# Patient Record
Sex: MALE | Race: WHITE | NOT HISPANIC OR LATINO | Employment: FULL TIME | URBAN - METROPOLITAN AREA
[De-identification: names, ages, dates, MRNs, and addresses within clinical notes are randomized per-mention and may not be internally consistent; named-entity substitution may affect disease eponyms.]

---

## 2017-02-01 ENCOUNTER — OFFICE VISIT (OUTPATIENT)
Dept: DERMATOLOGY | Facility: CLINIC | Age: 62
End: 2017-02-01

## 2017-02-01 DIAGNOSIS — Z85.828 HISTORY OF SKIN CANCER: ICD-10-CM

## 2017-02-01 DIAGNOSIS — D48.5 NEOPLASM OF UNCERTAIN BEHAVIOR OF SKIN: Primary | ICD-10-CM

## 2017-02-01 DIAGNOSIS — L90.5 SCAR: ICD-10-CM

## 2017-02-01 DIAGNOSIS — L57.0 ACTINIC KERATOSIS: ICD-10-CM

## 2017-02-01 DIAGNOSIS — L81.4 SOLAR LENTIGINOSIS: ICD-10-CM

## 2017-02-01 DIAGNOSIS — D22.9 MULTIPLE BENIGN NEVI: ICD-10-CM

## 2017-02-01 RX ORDER — LIDOCAINE HYDROCHLORIDE AND EPINEPHRINE 10; 10 MG/ML; UG/ML
3 INJECTION, SOLUTION INFILTRATION; PERINEURAL ONCE
Qty: 3 ML | Refills: 0 | OUTPATIENT
Start: 2017-02-01 | End: 2017-02-01

## 2017-02-01 NOTE — Clinical Note
"2/1/2017       RE: Eric Espinosa  [address]   [facility] [place] [address]     Dear Colleague,    Thank you for referring your patient, Eric Espinosa, to the [facility] DERMATOLOGY at [facility]. Please see a copy of my visit note below.    [facility] Dermatology Note      Dermatology Problem List:  1. History of basal cell carcinoma of the nose, L superior shoulder ([facility] 2013), and forehead (2012 w/ Dr. Fernández in [place], [place]).  2. Hypertrophic scars of the left nasal alar and left superior shoulder: both improved w/o treatment, consider CO2 laser to L nasal ala in future  3. Actinic keratoses  4. Hemangioma L shoudler s/p excision 4/26/2016 (suspected lymph node)  5. Possible psoriasis of scalp  6. NUB: L lower cheek ddx hemangioma v. BCC v. Other, bx 2/1/17  7. Spot to monitor: L scalp 4 mm erosion    Encounter Date: Feb 1, 2017    CC:   Chief Complaint   Patient presents with     Skin Check     New \"spots\" on face and rt. shoulder        History of Present Illness:  Mr. Eric Espinosa is a 61 year old male who presents for full body skin check and discussion of scar treatment options. Additionally, other concerns include marks on the forehead,, and L lower cheek jean paul that does not resolve. Also notes 2 bumps at the corner of his mouth, one closest to mouth has been present for many years, the other lateral to that has appeared within the last year. None of these spots are tender, bleeding or otherwise symptomatic.    Lastly, R lateral shoulder has pimple-like lesion that has been present one week that he had like looked at. This spot has bled.    He has had a total of 3 BCC: nose, forehead and shoulder. He denies any tender, bleeding or otherwise symptomatic spots other than those mentioned above.    He wears SPF 30+ sunscreen in the summer in the sun. Does not wear in the winter. No tanning bed use. No family h/o skin cancer to his knowledge.      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 The patient denies use of tanning beds.    Family History:  There is no family history of skin cancer.    Medications:  Current Outpatient Prescriptions   Medication Sig Dispense Refill     fluocinonide (LIDEX) 0.05 % external solution Apply topically 2 times daily 60 mL 3     ibuprofen (ADVIL,MOTRIN) 200 MG tablet Take 200 mg by mouth every 4 hours as needed       MELATONIN PO Take 5 tablets by mouth At Bedtime       zaleplon (SONATA) 5 MG capsule Take 1 capsule (5 mg) by mouth nightly as needed for sleep 15 capsule 0        Allergies   Allergen Reactions     Ciprofloxacin Rash         Review of Systems:  -Constitutional: The patient denies fatigue, fevers, chills, unintended weight loss, and night sweats.  -HEENT: Patient denies nonhealing oral sores.    Physical exam:  Vitals: There were no vitals taken for this visit.  GEN: This is a well developed, well-nourished male in no acute distress, in a pleasant mood.    SKIN: Full skin, which includes the head/face, both arms, chest, back, abdomen,both legs, genitalia and/or groin buttocks, digits and/or nails, was examined.  - There are erythematous macules with overyling adherent scale on the forehead x 3, R inferior helix x 1, L superior helix x 1  - There is a 2 mm pink smooth papule with increased vascularity but no specific vessel pattern under dermoscopy on the L lower cheek  - 1-2 mm of linear scar tissue at the superior aspect of  nasal ala graft scar  -There are two skin colored 3 mm papules just lateral to angle of mouth on left with no concerning dermoscopic features.  -Few scattered regular brown pigmented macules and papules are identified on the trunk and extremities, none with concerning features.  - Tan 2-3 mm macules on sun-exposed areas of the face, shoulders and upper chest and back  There is no erythema, telangectasias, nodularity, or pigmentation on the left nasal ala, left superior shoulder, and forehead at prev sites of skin cancer.  -There are few 3-4 mm waxy stuck on tan to brown papules on the forehead .  -4 mm angular  erosion on L fronto-temporal scalp  -No other lesions of concern on areas examined.     Impression/Plan:  1. NUB: ddx hemangioma v. BCC v. Other, left lower cheek    Punch biopsy:  After discussion of benefits and risks including but not limited to bleeding/bruising, pain/swelling, infection, scar, incomplete removal, nerve damage/numbness, recurrence, and non-diagnostic biopsy, written consent, verbal consent and photographs were obtained. Time-out was performed. The area was cleaned with isopropyl alcohol. 1 mL of 1% lidocaine with epinephrine was injected to obtain adequate anesthesia of the lesion on the L lower cheek. A 2 mm punch biopsy was performed.  6-0 prolene sutures were utilized to approximate the epidermal edges.  White petroleum jelly/VaselineTM and a bandage was applied to the wound.  Explicit verbal and written wound care instructions were provided.  The patient left the Dermatology Clinic in good condition. The patient was counseled to follow up for suture removal in approximately 7 days.    Will inform pt of results within the week    2. History of basal cell carcinoma of the nose, L superior shoulder ([facility] 2013), and forehead (2012 w/ Dr. Fernández in [place], [place]. No clinical evidence of recurrence    Sun protection and sunscreen counseling reviewed.    3. Scars of the left nasal alar  and left superior shoulder: both improved w/o treatment    discussed CO2 ablative laser procedure for treatment of excess scar tissue    pt to schedule as desired    4. Actinic keratoses:     Cryotherapy procedure note: After verbal consent and discussion of risks and benefits including but no limited to dyspigmentation/scar, blister, and pain, 5 total lesions was(were) treated with 1-2mm freeze border for 2 cycles with liquid nitrogen at sites documented above. Post cryotherapy instructions were provided.     5. Possible psoriasis of scalp with one 4 mm angular erosion appears to be from external source such as excoriation, will see in 3 mos for f/u for resolution    6. Erosion, also appears external right shoulder, recheck in 3 months    7. Multiple benign nevi and solar lentigenes     Follow-up in 3 months, earlier for new or changing lesions.       Dr. Patterson staffed the patient.    Staff Involved:  Resident(Nancy Monroy)/Staff(as above)    Nancy Monroy MD  PGY-3 Dermatology  Pager: [phone]

## 2017-02-01 NOTE — PROGRESS NOTES
"[facility] Dermatology Note      Dermatology Problem List:  1. History of basal cell carcinoma of the nose, L superior shoulder ([facility] 2013), and forehead (2012 w/ Dr. Fernández in [place], [place]).  2. Hypertrophic scars of the left nasal alar and left superior shoulder: both improved w/o treatment, consider CO2 laser to L nasal ala in future  3. Actinic keratoses  4. Hemangioma L shoudler s/p excision 4/26/2016 (suspected lymph node)  5. Possible psoriasis of scalp  6. NUB: L lower cheek ddx hemangioma v. BCC v. Other, bx 2/1/17  7. Spot to monitor: L scalp 4 mm erosion    Encounter Date: Feb 1, 2017    CC:   Chief Complaint   Patient presents with     Skin Check     New \"spots\" on face and rt. shoulder        History of Present Illness:  Mr. Eric Espinosa is a 61 year old male who presents for full body skin check and discussion of scar treatment options. Additionally, other concerns include marks on the forehead, and L lower cheek jean paul that does not resolve. Also notes 2 bumps at the corner of his mouth, one closest to mouth has been present for many years, the other lateral to that has appeared within the last year. None of these spots are tender, bleeding or otherwise symptomatic.    Lastly, R lateral shoulder has pimple-like lesion that has been present one week that he had like looked at. This spot has bled.    He has had a total of 3 BCC: nose, forehead and shoulder. He denies any tender, bleeding or otherwise symptomatic spots other than those mentioned above.    He wears SPF 30+ sunscreen in the summer in the sun. Does not wear in the winter. No tanning bed use. No family h/o skin cancer to his knowledge.      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 The patient denies use of tanning beds.    Family History:  There is no family history of skin cancer.    Medications:  Current Outpatient Prescriptions   Medication Sig Dispense Refill     fluocinonide (LIDEX) 0.05 % external solution Apply topically 2 times daily 60 mL 3     ibuprofen (ADVIL,MOTRIN) 200 MG tablet Take 200 mg by mouth every 4 hours as needed       MELATONIN PO Take 5 tablets by mouth At Bedtime       zaleplon (SONATA) 5 MG capsule Take 1 capsule (5 mg) by mouth nightly as needed for sleep 15 capsule 0        Allergies   Allergen Reactions     Ciprofloxacin Rash         Review of Systems:  -Constitutional: The patient denies fatigue, fevers, chills, unintended weight loss, and night sweats.  -HEENT: Patient denies nonhealing oral sores.    Physical exam:  Vitals: There were no vitals taken for this visit.  GEN: This is a well developed, well-nourished male in no acute distress, in a pleasant mood.    SKIN: Full skin, which includes the head/face, both arms, chest, back, abdomen,both legs, genitalia and/or groin buttocks, digits and/or nails, was examined.  - There are erythematous macules with overyling adherent scale on the forehead x 3, R inferior helix x 1, L superior helix x 1  - There is a 2 mm pink smooth papule with increased vascularity but no specific vessel pattern under dermoscopy on the L lower cheek  - 1-2 mm of linear scar tissue at the superior aspect of nasal ala graft scar  -There are two skin colored 3 mm papules just lateral to angle of mouth on left with no concerning dermoscopic features.  -Few scattered regular brown pigmented macules and papules are identified on the trunk and extremities, none with concerning  features.  - Tan 2-3 mm macules on sun-exposed areas of the face, shoulders and upper chest and back  There is no erythema, telangectasias, nodularity, or pigmentation on the left nasal ala, left superior shoulder, and forehead at prev sites of skin cancer.  -There are few 3-4 mm waxy stuck on tan to brown papules on the forehead .  -4 mm angular  erosion on L fronto-temporal scalp  -No other lesions of concern on areas examined.     Impression/Plan:  1. NUB: ddx hemangioma v. BCC v. Other, left lower cheek    Punch biopsy:  After discussion of benefits and risks including but not limited to bleeding/bruising, pain/swelling, infection, scar, incomplete removal, nerve damage/numbness, recurrence, and non-diagnostic biopsy, written consent, verbal consent and photographs were obtained. Time-out was performed. The area was cleaned with isopropyl alcohol. 1 mL of 1% lidocaine with epinephrine was injected to obtain adequate anesthesia of the lesion on the L lower cheek. A 2 mm punch biopsy was performed.  6-0 prolene sutures were utilized to approximate the epidermal edges.  White petroleum jelly/VaselineTM and a bandage was applied to the wound.  Explicit verbal and written wound care instructions were provided.  The patient left the Dermatology Clinic in good condition. The patient was counseled to follow up for suture removal in approximately 7 days.    Will inform pt of results within the week    2. History of basal cell carcinoma of the nose, L superior shoulder ([facility] 2013), and forehead (2012 w/ Dr. Fernández in [place], [place]. No clinical evidence of recurrence    Sun protection and sunscreen counseling reviewed.    3. Scars of the left nasal alar and left superior shoulder: both improved w/o treatment    discussed CO2 ablative laser procedure for treatment of excess scar tissue    pt to schedule as desired    4. Actinic keratoses:     Cryotherapy procedure note: After verbal consent and discussion of risks and  benefits including but no limited to dyspigmentation/scar, blister, and pain, 5 total lesions was(were) treated with 1-2mm freeze border for 2 cycles with liquid nitrogen at sites documented above. Post cryotherapy instructions were provided.     5. Possible psoriasis of scalp with one 4 mm angular erosion appears to be from external source such as excoriation, will see in 3 mos for f/u for resolution    6. Erosion, also appears external right shoulder, recheck in 3 months    7. Multiple benign nevi and solar lentigenes     Follow-up in 3 months, earlier for new or changing lesions.       Dr. Patterson staffed the patient.    Staff Involved:  Resident(Nancy Monroy)/Staff(as above)    Nancy Monroy MD  PGY-3 Dermatology  Pager: [phone]      Staff Physician Comments:   I saw and evaluated the patient with the resident and I agree with the assessment and plan.  I was present for the key portions of the above major procedure and examination..    Malathi Patterson MD    Department of Dermatology  [facility]: Phone: [phone], Fax:[phone]  [facility]: Phone: [phone], Fax: [phone]

## 2017-02-01 NOTE — MR AVS SNAPSHOT
After Visit Summary   2/1/2017    Eric Espinosa    MRN: [id number]           Patient Information     Date Of Birth          1955        Visit Information        Provider Department      2/1/2017 10:30 AM Malathi Patterson MD [facility] Dermatology        Today's Diagnoses     Neoplasm of uncertain behavior of skin    -  1     Actinic keratosis         History of skin cancer           Care Instructions    Cryotherapy    What is it?    Use of a very cold liquid, such as liquid nitrogen, to freeze and destroy abnormal skin cells that need to be removed    What should I expect?    Tenderness and redness    A small blister that might grow and fill with dark purple blood. There may be crusting.    More than one treatment may be needed if the lesions do not go away.    How do I care for the treated area?    Gently wash the area with your hands when bathing.    Use a thin layer of Vaseline to help with healing. You may use a Band-Aid.     The area should heal within 7-10 days and may leave behind a pink or lighter color.     Do not use an antibiotic or Neosporin ointment.     You may take acetaminophen (Tylenol) for pain.     Call your Doctor if you have:    Severe pain    Signs of infection (warmth, redness, cloudy yellow drainage, and or a bad smell)    Questions or concerns    Who should I call with questions?       [facility]: [phone]       [facility]: [phone]       For urgent needs outside of business hours call the [facility] at [phone]        and ask for the dermatology resident on call      Wound Care After a Biopsy    What is a skin biopsy?  A skin biopsy allows the doctor to examine a very small piece of tissue under the microscope to determine the diagnosis and the best treatment for the skin condition. A local anesthetic (numbing medicine)  is injected with a very small needle into the skin area to be tested. A  small piece of skin is taken from the area. Sometimes a suture (stitch) is used.     What are the risks of a skin biopsy?  I will experience scar, bleeding, swelling, pain, crusting and redness. I may experience incomplete removal or recurrence. Risks of this procedure are excessive bleeding, bruising, infection, nerve damage, numbness, thick (hypertrophic or keloidal) scar and non-diagnostic biopsy.    How should I care for my wound for the first 24 hours?    Keep the wound dry and covered for 24 hours    If it bleeds, hold direct pressure on the area for 15 minutes. If bleeding does not stop then go to the emergency room    Avoid strenuous exercise the first 1-2 days or as your doctor instructs you    How should I care for the wound after 24 hours?    After 24 hours, remove the bandage    You may bathe or shower as normal    If you had a scalp biopsy, you can shampoo as usual and can use shower water to clean the biopsy site daily    Clean the wound twice a day with gentle soap and water    Do not scrub, be gentle    Apply white petroleum/Vaseline after cleaning the wound with a cotton swab or a clean finger, and keep the site covered with a Bandaid /bandage. Bandages are not necessary with a scalp biopsy    If you are unable to cover the site with a Bandaid /bandage, re-apply ointment 2-3 times a day to keep the site moist. Moisture will help with healing    Avoid strenuous activity for first 1-2 days    Avoid lakes, rivers, pools, and oceans until the stitches are removed or the site is healed    How do I clean my wound?    Wash hands for 15 minutes with soap or use hand  before all wound care    Clean the wound with gentle soap and water    Apply white petroleum/Vaseline  to wound after it is clean    Replace the Bandaid /bandage to keep the wound covered for the first few days or as instructed by your doctor    If you had a scalp biopsy, warm shower water to the area on a daily basis should  suffice    What should I use to clean my wound?     Cotton-tipped applicators (Qtips )    White petroleum jelly (Vaseline ). Use a clean new container and use Q-tips to apply.    Bandaids   as needed    Gentle soap     How should I care for my wound long term?    Do not get your wound dirty    Keep up with wound care for one week or until the area is healed.    A small scab will form and fall off by itself when the area is completely healed. The area will be red and will become pink in color as it heals. Sun protection is very important for how your scar will turn out. Sunscreen with an SPF 30 or greater is recommended once the area is healed.    If you have stitches, stitches need to be removed on 2.8.17. You may return to our clinic for this or you may have it done locally at your doctor s office.    You should have some soreness but it should be mild and slowly go away over several days. Talk to your doctor about using tylenol for pain,    When should I call my doctor?  If you have increased:     Pain or swelling    Pus or drainage (clear or slightly yellow drainage is ok)    Temperature over 100F    Spreading redness or warmth around wound    When will I hear about my results?  The biopsy results can take 2-3 weeks to come back. The clinic will call you with the results, send you a [email or web address] message, or have you schedule a follow-up clinic or phone time to discuss the results. Contact our clinics if you do not hear from us in 3 weeks.     Who should I call with questions?    [facility]: [phone]     [facility]: [phone]    For urgent needs outside of business hours call the [facility] at [phone] and ask for the dermatology resident on call            Follow-ups after your visit        Your next 10 appointments already scheduled     Feb 08, 2017  1:00 PM   Nurse Visit with  Dermatology Nurse   [facility] Dermatology ([facility]  [facility])    [address] [id number] [phone]            May 03, 2017 11:15 AM   (Arrive by 11:00 AM)   Return Visit with Malathi Patterson MD   [facility] Dermatology ([facility])    [address] [place] [id number] [phone]              Who to contact     Please call your clinic at [phone] to:    Ask questions about your health    Make or cancel appointments    Discuss your medicines    Learn about your test results    Speak to your doctor   If you have compliments or concerns about an experience at your clinic, or if you wish to file a complaint, please contact [facility] Patient Relations at [phone] or email us at [email or web address]         Additional Information About Your Visit        MyChart Information     [employer] gives you secure access to your electronic health record. If you see a primary care provider, you can also send messages to your care team and make appointments. If you have questions, please call your primary care clinic.  If you do not have a primary care provider, please call [phone] and they will assist you.      [employer] is an electronic gateway that provides easy, online access to your medical records. With [employer], you can request a clinic appointment, read your test results, renew a prescription or communicate with your care team.     To access your existing account, please contact your [facility] or call [phone] for assistance.        Care EveryWhere ID     This is your Care EveryWhere ID. This could be used by other organizations to access your [place] medical records  [id number]         Blood Pressure from Last 3 Encounters:   09/06/16 132/82   06/13/16 119/78   06/04/16 145/91    Weight from Last 3 Encounters:   09/06/16 78.926 kg (174 lb)   06/13/16 78.336 kg (172 lb 11.2 oz)   06/04/16  78.472 kg (173 lb)              We Performed the Following     BIOPSY SKIN/SUBQ/MUC MEM, SINGLE LESION     DESTRUCT PREMALIGNANT LESION, 2-14     DESTRUCT PREMALIGNANT LESION, FIRST     Surgical pathology exam          Today's Medication Changes          These changes are accurate as of: 2/1/17 11:58 AM.  If you have any questions, ask your nurse or doctor.               Start taking these medicines.        Dose/Directions    lidocaine 1% with EPINEPHrine 1:100,000 1 %-1:565341 injection   Used for:  Neoplasm of uncertain behavior of skin        Dose:  3 mL   Inject 3 mLs into the skin once for 1 dose   Quantity:  3 mL   Refills:  0            Where to get your medicines      Some of these will need a paper prescription and others can be bought over the counter.  Ask your nurse if you have questions.     You don't need a prescription for these medications    - lidocaine 1% with EPINEPHrine 1:100,000 1 %-1:699386 injection             Primary Care Provider Office Phone # Fax #    Lanre Garland [phone]       [address] [address]        Thank you!     Thank you for choosing [facility] DERMATOLOGY  for your care. Our goal is always to provide you with excellent care. Hearing back from our patients is one way we can continue to improve our services. Please take a few minutes to complete the written survey that you may receive in the mail after your visit with us. Thank you!             Your Updated Medication List - Protect others around you: Learn how to safely use, store and throw away your medicines at www.disposemymeds.org.          This list is accurate as of: 2/1/17 11:58 AM.  Always use your most recent med list.                   Brand Name Dispense Instructions for use    fluocinonide 0.05 % solution    LIDEX    60 mL    Apply topically 2 times daily       ibuprofen 200 MG tablet    ADVIL/MOTRIN     Take 200 mg by mouth every 4 hours as needed        lidocaine 1% with EPINEPHrine 1:100,000 1 %-1:635250 injection     3 mL    Inject 3 mLs into the skin once for 1 dose       MELATONIN PO      Take 5 tablets by mouth At Bedtime       zaleplon 5 MG capsule    sonata    15 capsule    Take 1 capsule (5 mg) by mouth nightly as needed for sleep

## 2017-02-01 NOTE — NURSING NOTE
"Chief Complaint   Patient presents with     Skin Check     New \"spots\" on face and rt. shoulder      Kala Hensley RN     "

## 2017-02-01 NOTE — PATIENT INSTRUCTIONS
Cryotherapy    What is it?    Use of a very cold liquid, such as liquid nitrogen, to freeze and destroy abnormal skin cells that need to be removed    What should I expect?    Tenderness and redness    A small blister that might grow and fill with dark purple blood. There may be crusting.    More than one treatment may be needed if the lesions do not go away.    How do I care for the treated area?    Gently wash the area with your hands when bathing.    Use a thin layer of Vaseline to help with healing. You may use a Band-Aid.     The area should heal within 7-10 days and may leave behind a pink or lighter color.     Do not use an antibiotic or Neosporin ointment.     You may take acetaminophen (Tylenol) for pain.     Call your Doctor if you have:    Severe pain    Signs of infection (warmth, redness, cloudy yellow drainage, and or a bad smell)    Questions or concerns    Who should I call with questions?       [facility]: [phone]       [facility]: [phone]       For urgent needs outside of business hours call the [facility] at [phone]        and ask for the dermatology resident on call      Wound Care After a Biopsy    What is a skin biopsy?  A skin biopsy allows the doctor to examine a very small piece of tissue under the microscope to determine the diagnosis and the best treatment for the skin condition. A local anesthetic (numbing medicine)  is injected with a very small needle into the skin area to be tested. A small piece of skin is taken from the area. Sometimes a suture (stitch) is used.     What are the risks of a skin biopsy?  I will experience scar, bleeding, swelling, pain, crusting and redness. I may experience incomplete removal or recurrence. Risks of this procedure are excessive bleeding, bruising, infection, nerve damage, numbness, thick (hypertrophic or keloidal) scar and non-diagnostic biopsy.    How should I care  for my wound for the first 24 hours?    Keep the wound dry and covered for 24 hours    If it bleeds, hold direct pressure on the area for 15 minutes. If bleeding does not stop then go to the emergency room    Avoid strenuous exercise the first 1-2 days or as your doctor instructs you    How should I care for the wound after 24 hours?    After 24 hours, remove the bandage    You may bathe or shower as normal    If you had a scalp biopsy, you can shampoo as usual and can use shower water to clean the biopsy site daily    Clean the wound twice a day with gentle soap and water    Do not scrub, be gentle    Apply white petroleum/Vaseline after cleaning the wound with a cotton swab or a clean finger, and keep the site covered with a Bandaid /bandage. Bandages are not necessary with a scalp biopsy    If you are unable to cover the site with a Bandaid /bandage, re-apply ointment 2-3 times a day to keep the site moist. Moisture will help with healing    Avoid strenuous activity for first 1-2 days    Avoid lakes, rivers, pools, and oceans until the stitches are removed or the site is healed    How do I clean my wound?    Wash hands for 15 minutes with soap or use hand  before all wound care    Clean the wound with gentle soap and water    Apply white petroleum/Vaseline  to wound after it is clean    Replace the Bandaid /bandage to keep the wound covered for the first few days or as instructed by your doctor    If you had a scalp biopsy, warm shower water to the area on a daily basis should suffice    What should I use to clean my wound?     Cotton-tipped applicators (Qtips )    White petroleum jelly (Vaseline ). Use a clean new container and use Q-tips to apply.    Bandaids   as needed    Gentle soap     How should I care for my wound long term?    Do not get your wound dirty    Keep up with wound care for one week or until the area is healed.    A small scab will form and fall off by itself when the area is  completely healed. The area will be red and will become pink in color as it heals. Sun protection is very important for how your scar will turn out. Sunscreen with an SPF 30 or greater is recommended once the area is healed.    If you have stitches, stitches need to be removed on 2.8.17. You may return to our clinic for this or you may have it done locally at your doctor s office.    You should have some soreness but it should be mild and slowly go away over several days. Talk to your doctor about using tylenol for pain,    When should I call my doctor?  If you have increased:     Pain or swelling    Pus or drainage (clear or slightly yellow drainage is ok)    Temperature over 100F    Spreading redness or warmth around wound    When will I hear about my results?  The biopsy results can take 2-3 weeks to come back. The clinic will call you with the results, send you a [employer] message, or have you schedule a follow-up clinic or phone time to discuss the results. Contact our clinics if you do not hear from us in 3 weeks.     Who should I call with questions?    [facility]: [phone]     [facility]: [phone]    For urgent needs outside of business hours call the [facility] at [phone] and ask for the dermatology resident on call

## 2017-02-06 LAB — COPATH REPORT: NORMAL

## 2017-02-08 ENCOUNTER — ALLIED HEALTH/NURSE VISIT (OUTPATIENT)
Dept: DERMATOLOGY | Facility: CLINIC | Age: 62
End: 2017-02-08

## 2017-02-08 DIAGNOSIS — D48.5 NEOPLASM OF UNCERTAIN BEHAVIOR OF SKIN: Primary | ICD-10-CM

## 2017-02-08 NOTE — NURSING NOTE
Dermatology Rooming Note    Eric Espinosa's goals for this visit include:   Chief Complaint   Patient presents with     Suture Removal     Biopsy done on left lower cheek       Willow Brooks CMA    Removed 1 sutures, from Left lower cheek without complication.  Patient tolerated procedure well, and understood all followup instructions.

## 2017-03-01 ENCOUNTER — OFFICE VISIT (OUTPATIENT)
Dept: OPHTHALMOLOGY | Facility: CLINIC | Age: 62
End: 2017-03-01
Attending: OPHTHALMOLOGY
Payer: COMMERCIAL

## 2017-03-01 DIAGNOSIS — H25.13 SENILE NUCLEAR SCLEROSIS, BILATERAL: ICD-10-CM

## 2017-03-01 DIAGNOSIS — H52.4 MYOPIA WITH PRESBYOPIA OF BOTH EYES: Primary | ICD-10-CM

## 2017-03-01 DIAGNOSIS — H52.13 MYOPIA WITH PRESBYOPIA OF BOTH EYES: Primary | ICD-10-CM

## 2017-03-01 DIAGNOSIS — H04.123 DRY EYES, BILATERAL: ICD-10-CM

## 2017-03-01 PROCEDURE — 92015 DETERMINE REFRACTIVE STATE: CPT | Mod: ZF

## 2017-03-01 PROCEDURE — 99213 OFFICE O/P EST LOW 20 MIN: CPT | Mod: ZF

## 2017-03-01 ASSESSMENT — CONF VISUAL FIELD
OS_NORMAL: 1
METHOD: COUNTING FINGERS
OD_NORMAL: 1

## 2017-03-01 ASSESSMENT — TONOMETRY
OD_IOP_MMHG: 17
OD_IOP_MMHG: 21
OS_IOP_MMHG: 20
OS_IOP_MMHG: 19
IOP_METHOD: APPLANATION
OS_IOP_MMHG: 23
OD_IOP_MMHG: 24
IOP_METHOD: APPLANATION
IOP_METHOD: TONOPEN

## 2017-03-01 ASSESSMENT — REFRACTION_MANIFEST
OS_ADD: +2.50
OD_SPHERE: -4.75
OD_CYLINDER: +0.50
OS_CYLINDER: +0.50
OS_SPHERE: -5.50
OS_AXIS: 060
OD_AXIS: 095
OD_ADD: +2.50

## 2017-03-01 ASSESSMENT — CUP TO DISC RATIO
OS_RATIO: 0.3
OD_RATIO: 0.3

## 2017-03-01 ASSESSMENT — REFRACTION_WEARINGRX
OD_AXIS: 103
SPECS_TYPE: SVL
OS_CYLINDER: +0.50
OD_SPHERE: -4.25
OD_CYLINDER: +0.25
OS_AXIS: 085
OS_SPHERE: -5.00

## 2017-03-01 ASSESSMENT — VISUAL ACUITY
OD_CC: 20/20
OS_CC+: -2
CORRECTION_TYPE: GLASSES
OS_CC: 20/25
OD_CC+: -2
METHOD: SNELLEN - LINEAR

## 2017-03-01 ASSESSMENT — EXTERNAL EXAM - RIGHT EYE: OD_EXAM: NORMAL

## 2017-03-01 ASSESSMENT — PACHYMETRY
OS_CT(UM): 514
OD_CT(UM): 511

## 2017-03-01 ASSESSMENT — EXTERNAL EXAM - LEFT EYE: OS_EXAM: NORMAL

## 2017-03-01 NOTE — MR AVS SNAPSHOT
After Visit Summary   3/1/2017    Eric Espinosa    MRN: [id number]           Patient Information     Date Of Birth          1955        Visit Information        Provider Department      3/1/2017 9:30 AM Grisel Ingram MD [facility]        Today's Diagnoses     Myopia with presbyopia of both eyes    -  1    Senile nuclear sclerosis, bilateral        Dry eyes, bilateral           Follow-ups after your visit        Follow-up notes from your care team     Return in about 1 year (around 3/1/2018).      Your next 10 appointments already scheduled     May 03, 2017 11:15 AM CDT   (Arrive by 11:00 AM)   Return Visit with Malathi Patterson MD   [facility] ([facility])    [address]  [address] [facility] [address] [phone]              Who to contact     Please call your clinic at [phone] to:    Ask questions about your health    Make or cancel appointments    Discuss your medicines    Learn about your test results    Speak to your doctor   If you have compliments or concerns about an experience at your clinic, or if you wish to file a complaint, please contact [facility] Patient Relations at [phone] or email us at [email or web address]         Additional Information About Your Visit        MyChart Information     [employer] gives you secure access to your electronic health record. If you see a primary care provider, you can also send messages to your care team and make appointments. If you have questions, please call your primary care clinic.  If you do not have a primary care provider, please call [phone] and they will assist you.      [employer] is an electronic gateway that provides easy, online access to your medical records. With [employer], you can request a clinic appointment, read your test results, renew a prescription or communicate with your care team.     To access your existing account, please  contact your [facility] or call [phone] for assistance.        Care EveryWhere ID     This is your Care EveryWhere ID. This could be used by other organizations to access your [place] medical records  [id number]         Blood Pressure from Last 3 Encounters:   09/06/16 132/82   06/13/16 119/78   06/04/16 (!) 145/91    Weight from Last 3 Encounters:   09/06/16 78.9 kg (174 lb)   06/13/16 78.3 kg (172 lb 11.2 oz)   06/04/16 78.5 kg (173 lb)              Today, you had the following     No orders found for display       Primary Care Provider Office Phone # Fax #    Lanre Garland [phone]       [address] [address]        Thank you!     Thank you for choosing EYE CLINIC  for your care. Our goal is always to provide you with excellent care. Hearing back from our patients is one way we can continue to improve our services. Please take a few minutes to complete the written survey that you may receive in the mail after your visit with us. Thank you!             Your Updated Medication List - Protect others around you: Learn how to safely use, store and throw away your medicines at www.disposemymeds.org.          This list is accurate as of: 3/1/17 10:47 AM.  Always use your most recent med list.                   Brand Name Dispense Instructions for use    ARTIFICIAL TEAR OP      Apply 1 drop to eye as needed       fluocinonide 0.05 % solution    LIDEX    60 mL    Apply topically 2 times daily       ibuprofen 200 MG tablet    ADVIL/MOTRIN     Take 200 mg by mouth every 4 hours as needed       MELATONIN PO      Take 5 tablets by mouth At Bedtime       zaleplon 5 MG capsule    sonata    15 capsule    Take 1 capsule (5 mg) by mouth nightly as needed for sleep

## 2017-03-01 NOTE — NURSING NOTE
"Chief Complaints and History of Present Illnesses   Patient presents with     Blurred Vision Both Eyes     HPI    Affected eye(s):  Both   Symptoms:     No floaters   No flashes   No redness   No Dryness         Do you have eye pain now?:  No      Comments:  Pt having more difficulty seeing in the distance with current distant glasses. Pt has a prescription for distant glasses and separate glasses for near. Pt stopped using near glasses a few months ago because he saw clearer up close with out them.  Pt uses Systane lid wipes every morning to clear the \"sleep\" from his eyes.    Roman TERRY March 1, 2017 9:41 AM               "

## 2017-03-01 NOTE — PROGRESS NOTES
HPI:  Eric Espinosa is a 61 year old male myopia with astigmatism. Complains of am crusting. Using eye wipes daily. Complains of blurry vision at distance. Wears bifocals for using the computer. Will take glasses off while reading. Does complain of redness and occasional tearing, worse in the evening. Denies flashes or floaters. Also c/o photophobia on bright geoff days, wears sunglasses with relief. Denies halo symptoms at night.       Past Ocular Hx: myopia with astigmatism, blepharitis/conjunctivitis  Past Medical Hx: BCC x 3 (nose, shoulder, forehead), psoriasis  Past Surgical Hx: MOHS x3, ab hernia repair 2005, deviated septum repair 1995,       Current Eye Medications:  AT   Flax seed oil    Assessment & Plan:  (H52.13,  H52.4) Myopia with presbyopia of both eyes  (primary encounter diagnosis)  Comment:  Good vision with refraction  Plan: Given updated glasses Rx.     (H25.13) Senile nuclear sclerosis, bilateral  Comment: not visually significant  Plan: continue to monitor for now    (H04.123) Dry eyes, bilateral  Plan: continue lid scrubs, AT    Return in about 1 year (around 3/1/2018). or sooner as needed.      Scott Jones MD  PGY2, Dept of Ophthalmology  Pager [phone]    Teaching statement:  Complete documentation of historical and exam elements from today's encounter can be found in the full encounter summary report (not reduplicated in this progress note). I personally obtained the chief complaint(s) and history of present illness.  I confirmed and edited as necessary the review of systems, past medical/surgical history, family history, social history, and examination findings as documented by others; and I examined the patient myself. I personally reviewed the relevant tests, images, and reports as documented above.     I formulated and edited as necessary the assessment and plan and discussed the findings and management plan with the patient and family.    Grisel Ingram MD  Comprehensive  Ophthalmology & Ocular Pathology  Department of Ophthalmology and Visual Neurosciences  [email or web address]  Pager [phone]

## 2017-04-17 ENCOUNTER — OFFICE VISIT (OUTPATIENT)
Dept: ORTHOPEDICS | Facility: CLINIC | Age: 62
End: 2017-04-17

## 2017-04-17 VITALS — BODY MASS INDEX: 24.34 KG/M2 | WEIGHT: 170 LBS | HEIGHT: 70 IN

## 2017-04-17 DIAGNOSIS — M65.979 TENOSYNOVITIS OF ANKLE: Primary | ICD-10-CM

## 2017-04-17 DIAGNOSIS — M25.571 RIGHT ANKLE PAIN, UNSPECIFIED CHRONICITY: Primary | ICD-10-CM

## 2017-04-17 NOTE — MR AVS SNAPSHOT
After Visit Summary   4/17/2017    Eric Espinosa    MRN: [id number]           Patient Information     Date Of Birth          1955        Visit Information        Provider Department      4/17/2017 4:30 PM Te Leone MD [facility] Sports Medicine        Today's Diagnoses     Tenosynovitis of ankle    -  1       Follow-ups after your visit        Your next 10 appointments already scheduled     May 03, 2017 11:15 AM CDT   (Arrive by 11:00 AM)   Return Visit with Malathi Patterson MD   [facility] Dermatology ([facility] [place])    [address] [place] [address] [phone]              Who to contact     Please call your clinic at [phone] to:    Ask questions about your health    Make or cancel appointments    Discuss your medicines    Learn about your test results    Speak to your doctor   If you have compliments or concerns about an experience at your clinic, or if you wish to file a complaint, please contact [facility] Patient Relations at [phone] or email us at [email or web address]         Additional Information About Your Visit        MyChart Information     [employer] gives you secure access to your electronic health record. If you see a primary care provider, you can also send messages to your care team and make appointments. If you have questions, please call your primary care clinic.  If you do not have a primary care provider, please call [phone] and they will assist you.      [employer] is an electronic gateway that provides easy, online access to your medical records. With [employer], you can request a clinic appointment, read your test results, renew a prescription or communicate with your care team.     To access your existing account, please contact your [facility] or call [phone] for assistance.        Care EveryWhere ID     This is your Care EveryWhere ID.  "This could be used by other organizations to access your [place] medical records  [id number]        Your Vitals Were     Height BMI (Body Mass Index)                5' 10\" (1.778 m) 24.39 kg/m2           Blood Pressure from Last 3 Encounters:   09/06/16 132/82   06/13/16 119/78   06/04/16 (!) 145/91    Weight from Last 3 Encounters:   04/17/17 170 lb (77.1 kg)   09/06/16 174 lb (78.9 kg)   06/13/16 172 lb 11.2 oz (78.3 kg)              Today, you had the following     No orders found for display       Primary Care Provider Office Phone # Fax #    Lanre Garland [phone]       [address] [address]        Thank you!     Thank you for choosing [facility]  for your care. Our goal is always to provide you with excellent care. Hearing back from our patients is one way we can continue to improve our services. Please take a few minutes to complete the written survey that you may receive in the mail after your visit with us. Thank you!             Your Updated Medication List - Protect others around you: Learn how to safely use, store and throw away your medicines at www.disposemymeds.org.          This list is accurate as of: 4/17/17 11:59 PM.  Always use your most recent med list.                   Brand Name Dispense Instructions for use    ARTIFICIAL TEAR OP      Apply 1 drop to eye as needed       fluocinonide 0.05 % solution    LIDEX    60 mL    Apply topically 2 times daily       ibuprofen 200 MG tablet    ADVIL/MOTRIN     Take 200 mg by mouth every 4 hours as needed       MELATONIN PO      Take 5 tablets by mouth At Bedtime       zaleplon 5 MG capsule    sonata    15 capsule    Take 1 capsule (5 mg) by mouth nightly as needed for sleep         "

## 2017-04-17 NOTE — PROGRESS NOTES
" Subjective:   Eric Espinosa is a 61 year old male who complains of right ankle pain. He notes that he has not had any specific injury per se.  He noted that as of Saturday he started having discomfort in his right medial ankle.  He describes the area of discomfort as being along the course of the posterior tibialis tendon.  He denies any specific injury.  Denies any particular over use on Friday or Saturday.  After he had onset of discomfort, he did go for a bike ride and notes that did not particularly aggravate or relieve his symptoms.  He has a history of psoriasis, primarily scalp based.  He has not had a prior diagnosis of psoriatic arthropathy.  Denies history of conjunctivitis or urethritis.     Background:   Date of injury: None   Duration of symptoms: 2 days  Mechanism of Injury: Insidious Onset; Unknown   Aggravating factors: Walking, weightbearing   Relieving Factors: Ace wrap  Prior Evaluation: None    PAST MEDICAL, SOCIAL, SURGICAL AND FAMILY HISTORY: He  has a past medical history of Basal cell carcinoma and Skin cancer.  He  has a past surgical history that includes Mohs micrographic procedure.  His family history is negative for CANCER, Skin Cancer, Glaucoma, and Macular Degeneration.  He reports that he has never smoked. He has never used smokeless tobacco. He reports that he drinks alcohol. He reports that he does not use illicit drugs.    ALLERGIES: He is allergic to ciprofloxacin.    CURRENT MEDICATIONS: He has a current medication list which includes the following prescription(s): artificial tear, fluocinonide, ibuprofen, melatonin, and zaleplon.     REVIEW OF SYSTEMS: 12 point review of systems is negative except as noted above.     Exam:   Ht 5' 10\" (1.778 m)  Wt 170 lb (77.1 kg)  BMI 24.39 kg/m2      CONSTITUTIONIAL: healthy, alert and no distress  GAIT: normal  NEUROLOGIC: Non-focal  PSYCHIATRIC: affect normal/bright and mentation appears normal.  RIGHT ANKLE:  The Achilles is nontender " and intact.  There is no subtalar tenderness.  The calcaneus is nontender.  The medial and lateral malleoli are nontender.  There is no discrete mid foot tenderness.  There is no tenderness over the talocrural articulation and there is no evidence of any effusion.  There is some bogginess about the posterior tibialis tendon posterior to the medial malleolus over a course of about 4 cm.  There is no pain with full active or passive dorsiflexion of the ankle or resisted plantar flexion.      ASSESSMENT:  Tenosynovitis of the posteromedial ankle tendons.      PLAN:  Discussed this at length.  We will start ibuprofen 800 mg t.i.d. for the next 2 weeks.  If symptoms resolve, he can continue with his activities.  If his symptoms do not resolve, then recommend that he return for reevaluation.  He has not had a prior diagnosis of psoriatic arthritis, but tenosynovitis could be a possible association with psoriatic arthritis.  He is aware of the following.  He is going to hold off on his elliptical  use and cycling at this time until symptoms resolve.

## 2017-04-17 NOTE — LETTER
"  4/17/2017      RE: Eric Espinosa  [address]   [facility] [address]        Subjective:   Eric Espinosa is a 61 year old male who complains of right ankle pain. He notes that he has not had any specific injury per se.  He noted that as of Saturday he started having discomfort in his right medial ankle.  He describes the area of discomfort as being along the course of the posterior tibialis tendon.  He denies any specific injury.  Denies any particular over use on Friday or Saturday.  After he had onset of discomfort, he did go for a bike ride and notes that did not particularly aggravate or relieve his symptoms.  He has a history of psoriasis, primarily scalp based.  He has not had a prior diagnosis of psoriatic arthropathy.  Denies history of conjunctivitis or urethritis.     Background:   Date of injury: None   Duration of symptoms: 2 days  Mechanism of Injury: Insidious Onset; Unknown   Aggravating factors: Walking, weightbearing   Relieving Factors: Ace wrap  Prior Evaluation: None    PAST MEDICAL, SOCIAL, SURGICAL AND FAMILY HISTORY: He  has a past medical history of Basal cell carcinoma and Skin cancer.  He  has a past surgical history that includes Mohs micrographic procedure.  His family history is negative for CANCER, Skin Cancer, Glaucoma, and Macular Degeneration.  He reports that he has never smoked. He has never used smokeless tobacco. He reports that he drinks alcohol. He reports that he does not use illicit drugs.    ALLERGIES: He is allergic to ciprofloxacin.    CURRENT MEDICATIONS: He has a current medication list which includes the following prescription(s): artificial tear, fluocinonide, ibuprofen, melatonin, and zaleplon.     REVIEW OF SYSTEMS: 12 point review of systems is negative except as noted above.     Exam:   Ht 5' 10\" (1.778 m)  Wt 170 lb (77.1 kg)  BMI 24.39 kg/m2      CONSTITUTIONIAL: healthy, alert and no distress  GAIT: normal  NEUROLOGIC: Non-focal  PSYCHIATRIC: affect " normal/bright and mentation appears normal.  RIGHT ANKLE:  The Achilles is nontender and intact.  There is no subtalar tenderness.  The calcaneus is nontender.  The medial and lateral malleoli are nontender.  There is no discrete mid foot tenderness.  There is no tenderness over the talocrural articulation and there is no evidence of any effusion.  There is some bogginess about the posterior tibialis tendon posterior to the medial malleolus over a course of about 4 cm.  There is no pain with full active or passive dorsiflexion of the ankle or resisted plantar flexion.      ASSESSMENT:  Tenosynovitis of the posteromedial ankle tendons.      PLAN:  Discussed this at length.  We will start ibuprofen 800 mg t.i.d. for the next 2 weeks.  If symptoms resolve, he can continue with his activities.  If his symptoms do not resolve, then recommend that he return for reevaluation.  He has not had a prior diagnosis of psoriatic arthritis, but tenosynovitis could be a possible association with psoriatic arthritis.  He is aware of the following.  He is going to hold off on his elliptical  use and cycling at this time until symptoms resolve.         Te Leone MD

## 2017-05-03 ENCOUNTER — OFFICE VISIT (OUTPATIENT)
Dept: DERMATOLOGY | Facility: CLINIC | Age: 62
End: 2017-05-03

## 2017-05-03 DIAGNOSIS — Z85.828 HISTORY OF NONMELANOMA SKIN CANCER: Primary | ICD-10-CM

## 2017-05-03 ASSESSMENT — PAIN SCALES - GENERAL: PAINLEVEL: NO PAIN (0)

## 2017-05-03 NOTE — LETTER
5/3/2017       RE: Eric Espinosa  [address] [place] [address]     Dear Colleague,    Thank you for referring your patient, Eric Espinosa, to the [facility] DERMATOLOGY at [facility]. Please see a copy of my visit note below.    [facility] Dermatology Note      Dermatology Problem List:  1. History of basal cell carcinoma of the nose, L superior shoulder ([facility] 2013), and forehead (2012 w/ Dr. Fernández in [place], [place]).  2. Hypertrophic scars of the left nasal alar and left superior shoulder: both improved w/o treatment, could consider CO2 laser to L nasal ala in future  3. Actinic keratoses  4. Hemangioma L shoudler s/p excision 4/26/2016 (suspected lymph node)  6. Intradermal melanocytic nevus: L lower cheek s/p bx 2/1/17  7. Seborrheic dermatitis vs psoriasis: patch on left temporal scalp with predominant scale; recommended Neutragena T-Justen Shapoo daily (then Bakers PNS if not improving)    Encounter Date: May 3, 2017    CC:  Chief Complaint   Patient presents with     Derm Problem     Eric is here today for a 3 month follow up.  States he has 3 spots on his forehead and scalp.           History of Present Illness:  Mr. Eric Espinosa is a 61 year old male who presents as a follow-up for scalp monitoring. The patient was last seen 2/01/17 when a biopsy was taken from his left cheek showing an intradermal nevus.  Left scalp with an erosion that was scheduled for evaluation today. He's historically had psoriasis in this spot which has generally been well controlled with Lidex topically. States he feels it was irritated from excoriation at the previous visit. Does not note any concerning lesion today; some scaling but this seems to dissapear with lidex treatment. Feels this is improved today. Does not cause him any pain or discomfort; does not spontaneously bleed. Has another similar spot on his sternum which is also well controlled with topical  steroids.     Noted three spots on the forehead that have been present for an indeterminate amount of time. Has not noted any change and these do not cause him any pain.    Patient additionally with recent spontaneous right ankle pain with recent xray taken for possible psoriatic arthritis given self-reported history of psoriasis. Xray without abnormality; diagnosed on exam with tenosynovitis with good response to daily ibuprofen therapy.    Past Medical History:   Patient Active Problem List   Diagnosis     Psoriasis     Neoplasm of uncertain behavior of skin     AK (actinic keratosis)     BCC (basal cell carcinoma), trunk     Actinic keratosis     History of skin cancer     Diverticulitis     Past Medical History:   Diagnosis Date     Basal cell carcinoma      Skin cancer      Past Surgical History:   Procedure Laterality Date     MOHS MICROGRAPHIC PROCEDURE         Social History:  The patient .    Family History:  There is no family history of skin cancer.    Medications:  Current Outpatient Prescriptions   Medication Sig Dispense Refill     ARTIFICIAL TEAR OP Apply 1 drop to eye as needed       fluocinonide (LIDEX) 0.05 % external solution Apply topically 2 times daily 60 mL 3     ibuprofen (ADVIL,MOTRIN) 200 MG tablet Take 200 mg by mouth every 4 hours as needed       MELATONIN PO Take 5 tablets by mouth At Bedtime       zaleplon (SONATA) 5 MG capsule Take 1 capsule (5 mg) by mouth nightly as needed for sleep 15 capsule 0     Allergies   Allergen Reactions     Ciprofloxacin Rash         Review of Systems:  -Constitutional: The patient denies fatigue, fevers, chills, unintended weight loss, and night sweats.  - MSK: endorses right ankle pain for which he sees orth  -Skin: As above in HPI. No additional skin concerns.    Physical exam:  Vitals: There were no vitals taken for this visit.  GEN: This is a well developed, well-nourished male in no acute distress, in a pleasant mood.    SKIN: Focused  examination of the shoulder and scalp.   -  Flaky yellow white scale on the scalp, left frontal scalp. No erosions, papules or plaques  - well healed scar at left nasal alar crease  -no skin lesions noted on the upper back/shoulder/arm  -No other lesions of concern on areas examined.     Impression/Plan:  1.  History of basal cell carcinoma of the nose, L superior shoulder ([facility] 2013), and forehead (2012 w/ Dr. Fernández in [place], [place]); no evidence of recurrence:   - Sun protection advised. Next skin exam in 1 year    2. Seborrheic dermatitis: scaling of scaling scalp - not red not inflamed with scale most prominent component at present. Could consider psoriatic disease given pts stated psoriasis but this feels less likely and is difficult to assess given current extent of disease. Discussed symptomatology and pathophysiology of psoriatic arthritis and possible diagnostic work-up if his ankle pain fails to improve.   - Recommended daily salicylic containing shampoo    - Recommended Bakers P&S if not improving   -Call clinic if any lesions return on the scalp    3. Erosion on shoulder- no evidence today. Resolved   4. Scar: in setting of previous MMS. Discussed laser therapy; patient not interested at this time.      Follow-up in 1 year, earlier for new or changing lesions.     Staff Involved:  Scribed by Stef Abdul, MS3 for Dr. Patterson.        Malathi Patterson MD    Department of Dermatology  [facility]: Phone: [phone], Fax:[phone]  [facility]: Phone: [phone], Fax: [phone]

## 2017-05-03 NOTE — NURSING NOTE
Dermatology Rooming Note    Eric Espinosa's goals for this visit include:   Chief Complaint   Patient presents with     Derm Problem     Eric is here today for a 3 month follow up.  States he has 3 spots on his forehead and scalp.         Elaine Gupta LPN

## 2017-05-03 NOTE — MR AVS SNAPSHOT
After Visit Summary   5/3/2017    Eric Espinosa    MRN: [id number]           Patient Information     Date Of Birth          1955        Visit Information        Provider Department      5/3/2017 11:15 AM Malathi Patterson MD [facility] Dermatology        Care Instructions    Alfredoa ELIER-SAL Shampoo    Bakers PNS would be the next step up if not improving        Follow-ups after your visit        Who to contact     Please call your clinic at [phone] to:    Ask questions about your health    Make or cancel appointments    Discuss your medicines    Learn about your test results    Speak to your doctor   If you have compliments or concerns about an experience at your clinic, or if you wish to file a complaint, please contact [facility] Patient Relations at [phone] or email us at [email or web address]         Additional Information About Your Visit        MyChart Information     [employer] gives you secure access to your electronic health record. If you see a primary care provider, you can also send messages to your care team and make appointments. If you have questions, please call your primary care clinic.  If you do not have a primary care provider, please call [phone] and they will assist you.      [employer] is an electronic gateway that provides easy, online access to your medical records. With [employer], you can request a clinic appointment, read your test results, renew a prescription or communicate with your care team.     To access your existing account, please contact your [facility] or call [phone] for assistance.        Care EveryWhere ID     This is your Care EveryWhere ID. This could be used by other organizations to access your [place] medical records  [id number]         Blood Pressure from Last 3 Encounters:   09/06/16 132/82   06/13/16 119/78   06/04/16 (!) 145/91    Weight from Last 3 Encounters:   04/17/17  77.1 kg (170 lb)   09/06/16 78.9 kg (174 lb)   06/13/16 78.3 kg (172 lb 11.2 oz)              Today, you had the following     No orders found for display       Primary Care Provider Office Phone # Fax #    Lanre Garland [phone]       [address] [address]        Thank you!     Thank you for choosing [facility] DERMATOLOGY  for your care. Our goal is always to provide you with excellent care. Hearing back from our patients is one way we can continue to improve our services. Please take a few minutes to complete the written survey that you may receive in the mail after your visit with us. Thank you!             Your Updated Medication List - Protect others around you: Learn how to safely use, store and throw away your medicines at www.disposemymeds.org.          This list is accurate as of: 5/3/17 12:13 PM.  Always use your most recent med list.                   Brand Name Dispense Instructions for use    ARTIFICIAL TEAR OP      Apply 1 drop to eye as needed       fluocinonide 0.05 % solution    LIDEX    60 mL    Apply topically 2 times daily       ibuprofen 200 MG tablet    ADVIL/MOTRIN     Take 200 mg by mouth every 4 hours as needed       MELATONIN PO      Take 5 tablets by mouth At Bedtime       zaleplon 5 MG capsule    sonata    15 capsule    Take 1 capsule (5 mg) by mouth nightly as needed for sleep

## 2017-05-03 NOTE — PROGRESS NOTES
[facility] Dermatology Note      Dermatology Problem List:  1. History of basal cell carcinoma of the nose, L superior shoulder ([facility] 2013), and forehead (2012 w/ Dr. Fernández in [place], [place]).  2. Hypertrophic scars of the left nasal alar and left superior shoulder: both improved w/o treatment, could consider CO2 laser to L nasal ala in future  3. Actinic keratoses  4. Hemangioma L shoudler s/p excision 4/26/2016 (suspected lymph node)  6. Intradermal melanocytic nevus: L lower cheek s/p bx 2/1/17  7. Seborrheic dermatitis vs psoriasis: patch on left temporal scalp with predominant scale; recommended Neutragena T-Justen Shapoo daily (then Bakers PNS if not improving)    Encounter Date: May 3, 2017    CC:  Chief Complaint   Patient presents with     Derm Problem     Eric is here today for a 3 month follow up.  States he has 3 spots on his forehead and scalp.           History of Present Illness:  Mr. Eric Espinosa is a 61 year old male who presents as a follow-up for scalp monitoring. The patient was last seen 2/01/17 when a biopsy was taken from his left cheek showing an intradermal nevus.  Left scalp with an erosion that was scheduled for evaluation today. He's historically had psoriasis in this spot which has generally been well controlled with Lidex topically. States he feels it was irritated from excoriation at the previous visit. Does not note any concerning lesion today; some scaling but this seems to dissapear with lidex treatment. Feels this is improved today. Does not cause him any pain or discomfort; does not spontaneously bleed. Has another similar spot on his sternum which is also well controlled with topical steroids.     Noted three spots on the forehead that have been present for an indeterminate amount of time. Has not noted any change and these do not cause him any pain.    Patient additionally with recent spontaneous right ankle pain with recent xray taken for possible  psoriatic arthritis given self-reported history of psoriasis. Xray without abnormality; diagnosed on exam with tenosynovitis with good response to daily ibuprofen therapy.    Past Medical History:   Patient Active Problem List   Diagnosis     Psoriasis     Neoplasm of uncertain behavior of skin     AK (actinic keratosis)     BCC (basal cell carcinoma), trunk     Actinic keratosis     History of skin cancer     Diverticulitis     Past Medical History:   Diagnosis Date     Basal cell carcinoma      Skin cancer      Past Surgical History:   Procedure Laterality Date     MOHS MICROGRAPHIC PROCEDURE         Social History:  The patient .    Family History:  There is no family history of skin cancer.    Medications:  Current Outpatient Prescriptions   Medication Sig Dispense Refill     ARTIFICIAL TEAR OP Apply 1 drop to eye as needed       fluocinonide (LIDEX) 0.05 % external solution Apply topically 2 times daily 60 mL 3     ibuprofen (ADVIL,MOTRIN) 200 MG tablet Take 200 mg by mouth every 4 hours as needed       MELATONIN PO Take 5 tablets by mouth At Bedtime       zaleplon (SONATA) 5 MG capsule Take 1 capsule (5 mg) by mouth nightly as needed for sleep 15 capsule 0     Allergies   Allergen Reactions     Ciprofloxacin Rash         Review of Systems:  -Constitutional: The patient denies fatigue, fevers, chills, unintended weight loss, and night sweats.  - MSK: endorses right ankle pain for which he sees orth  -Skin: As above in HPI. No additional skin concerns.    Physical exam:  Vitals: There were no vitals taken for this visit.  GEN: This is a well developed, well-nourished male in no acute distress, in a pleasant mood.    SKIN: Focused examination of the shoulder and scalp.   -  Flaky yellow white scale on the scalp, left frontal scalp. No erosions, papules or plaques  - well healed scar at left nasal alar crease  -no skin lesions noted on the upper back/shoulder/arm  -No other lesions of concern on areas  examined.     Impression/Plan:  1.  History of basal cell carcinoma of the nose, L superior shoulder ([facility] 2013), and forehead (2012 w/ Dr. Fernández in [place], [place]); no evidence of recurrence:   - Sun protection advised. Next skin exam in 1 year    2. Seborrheic dermatitis: scaling of scaling scalp - not red not inflamed with scale most prominent component at present. Could consider psoriatic disease given pts stated psoriasis but this feels less likely and is difficult to assess given current extent of disease. Discussed symptomatology and pathophysiology of psoriatic arthritis and possible diagnostic work-up if his ankle pain fails to improve.   - Recommended daily salicylic containing shampoo    - Recommended Bakers P&S if not improving   -Call clinic if any lesions return on the scalp    3. Erosion on shoulder- no evidence today. Resolved   4. Scar: in setting of previous MMS. Discussed laser therapy; patient not interested at this time.      Follow-up in 1 year, earlier for new or changing lesions.     Staff Involved:  Scribed by Stef Abdul, MS3 for Dr. Patterson.        Malathi Patterson MD    Department of Dermatology  [facility]: Phone: [phone], Fax:[phone]  [facility]: Phone: [phone], Fax: [phone]

## 2017-07-19 DIAGNOSIS — G47.9 SLEEP DISORDER: ICD-10-CM

## 2017-07-19 RX ORDER — ZALEPLON 5 MG/1
5 CAPSULE ORAL
Qty: 15 CAPSULE | Refills: 0 | Status: SHIPPED | OUTPATIENT
Start: 2017-07-19 | End: 2017-07-20

## 2017-07-20 ENCOUNTER — MYC MEDICAL ADVICE (OUTPATIENT)
Dept: INTERNAL MEDICINE | Facility: CLINIC | Age: 62
End: 2017-07-20

## 2017-07-20 DIAGNOSIS — G47.9 SLEEP DISORDER: ICD-10-CM

## 2017-07-20 RX ORDER — ZALEPLON 5 MG/1
5 CAPSULE ORAL
Qty: 15 CAPSULE | Refills: 0 | Status: SHIPPED | OUTPATIENT
Start: 2017-07-20 | End: 2018-06-30

## 2017-07-20 NOTE — TELEPHONE ENCOUNTER
RX for Sonata to [facility].  Zoe Talbot RN 3:15 PM on 7/20/2017.

## 2017-08-08 ENCOUNTER — OFFICE VISIT (OUTPATIENT)
Dept: DERMATOLOGY | Facility: CLINIC | Age: 62
End: 2017-08-08

## 2017-08-08 DIAGNOSIS — L57.0 ACTINIC KERATOSIS: Primary | ICD-10-CM

## 2017-08-08 DIAGNOSIS — L40.9 PSORIASIS OF SCALP: ICD-10-CM

## 2017-08-08 DIAGNOSIS — Z85.828 HISTORY OF BASAL CELL CANCER: ICD-10-CM

## 2017-08-08 RX ORDER — FLUOCINONIDE TOPICAL SOLUTION USP, 0.05% 0.5 MG/ML
SOLUTION TOPICAL 2 TIMES DAILY
Qty: 60 ML | Refills: 1 | Status: SHIPPED | OUTPATIENT
Start: 2017-08-08

## 2017-08-08 ASSESSMENT — PAIN SCALES - GENERAL
PAINLEVEL: MILD PAIN (2)
PAINLEVEL: NO PAIN (0)

## 2017-08-08 NOTE — PATIENT INSTRUCTIONS
Cryotherapy    What is it?    Use of a very cold liquid, such as liquid nitrogen, to freeze and destroy abnormal skin cells that need to be removed    What should I expect?    Tenderness and redness    A small blister that might grow and fill with dark purple blood. There may be crusting.    More than one treatment may be needed if the lesions do not go away.    How do I care for the treated area?    Gently wash the area with your hands when bathing.    Use a thin layer of Vaseline to help with healing. You may use a Band-Aid.     The area should heal within 7-10 days and may leave behind a pink or lighter color.     Do not use an antibiotic or Neosporin ointment.     You may take acetaminophen (Tylenol) for pain.     Call your Doctor if you have:    Severe pain    Signs of infection (warmth, redness, cloudy yellow drainage, and or a bad smell)    Questions or concerns    Who should I call with questions?       [facility]: [phone]       [facility]: [phone]       For urgent needs outside of business hours call the [facility] at [phone]        and ask for the dermatology resident on call

## 2017-08-08 NOTE — PROGRESS NOTES
[facility] Dermatology Note      Dermatology Problem List:  1. History of basal cell carcinoma of the nose, L superior shoulder ([facility] 2013), and forehead (2012 w/ Dr. Fernández in [place], [place]).  2. Hypertrophic scars of the left nasal alar and left superior shoulder: both improved w/o treatment, could consider CO2 laser to L nasal ala in future  3. Actinic keratoses  4. Hemangioma L shoudler s/p excision 4/26/2016 (suspected lymph node)  6. Intradermal melanocytic nevus: L lower cheek s/p bx 2/1/17  7. Seborrheic dermatitis vs psoriasis: patch on left temporal scalp with predominant scale; recommended Neutragena T-Justen Shapoo daily (then Bakers PNS if not improving)    Encounter Date: Aug 8, 2017    CC:   Chief Complaint   Patient presents with     Derm Problem     BCC that was removed from his nose in December of 2015. Eric notes that this area started bleeding last week. No pain in that area.         History of Present Illness:  Mr. Eric Espinosa is a 62 year old male who presents for evaluation of recent bleeding from his left nasal ala scar. Patient has a history of three BCCs with the most recent being on his left nasal ala that was treated with MMS in 2015. He had some nasal ala reconstructive surgery with Dr. John Beasley in December 2015. He reports that he was in his usual state of health until last week on 8/2 when he noticed that his left nasal scar became bright purple. The next day, on 8/3, the scar began to bleed from the back of the scar. It stopped bleeding on 8/4, and then bleed tiny amounts on 8/5. It has not bleed since. He says that today, the scar is pretty much back to normal except for a small scab at the back of the scar. He is concerned that he may have recurrence of BCC within the scar.    He is otherwise feeling well today and denies fevers, chills, night sweats or recent unexplained weight loss.     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    Family History:  Family History   Problem Relation Age of Onset     CANCER No family hx of      No family history of skin cancer     Skin Cancer No family hx of      Glaucoma No family hx of      Macular Degeneration No family hx of          Medications:  Current Outpatient Prescriptions   Medication Sig Dispense Refill     zaleplon (SONATA) 5 MG capsule Take 1 capsule (5 mg) by mouth nightly as needed for sleep 15 capsule 0     ARTIFICIAL TEAR OP Apply 1 drop to eye as needed       fluocinonide (LIDEX) 0.05 % external solution Apply topically 2 times daily 60 mL 3     ibuprofen (ADVIL,MOTRIN) 200 MG tablet Take 200 mg by mouth every 4 hours as needed       MELATONIN PO Take 5 tablets by mouth At Bedtime          Allergies   Allergen Reactions     Ciprofloxacin Rash         Review of Systems:  -As per HPI  -Constitutional: The patient denies fatigue, fevers, chills, unintended weight loss, and night sweats.  -HEENT: Patient denies nonhealing oral sores.  -Skin: As above in HPI. No additional skin concerns.    Physical exam:  Vitals: There were no vitals taken for this visit.  GEN: This is a well developed, well-nourished male in no acute distress, in a pleasant mood.    SKIN: Focused examination of the face was performed.  - There are two erythematous macules with overyling adherent scale on the forehead.   - Well healed graft scar on the left nasal ala with 1-2 mm linear scar tissue, also has 2 mm crusted papule at the posterior aspect of scar  -No other lesions of  concern on areas examined.     Impression/Plan:  1. Actinic keratosis    Cryotherapy procedure note: After verbal consent and discussion of risks and benefits including but no limited to dyspigmentation/scar, blister, and pain, 2 was(were) treated with 1-2mm freeze border for 2 cycles with liquid nitrogen. Post cryotherapy instructions were provided.     2. Well healed graft scar with recent episode of bleeding: on exam today, no evidence of bleeding, however, small crusted papule near area of described bleeding. Bleeding may be due to either inflammation in scar or less likely recurrence of BCC. Discussed options of biopsy today versus watching and waiting given that it is now improving. Patient opted for watching and waiting. Clinical followup at next visit, sooner if increased symptoms.      Follow-up for previous 1 year skin check.    Dr. Bruce staffed the patient.    Staff Involved:  Resident(Santiago Orozco)/Staff(as above)    Santiago Orozco MD, PhD  Medicine-Dermatology PGY-2    I have seen and examined this patient and agree with the assessment and plan as documented in the resident's note, and was present for all procedures.    Zbigniew Bruce MD  Dermatology Attending

## 2017-08-08 NOTE — MR AVS SNAPSHOT
After Visit Summary   8/8/2017    Eric Espinosa    MRN: [id number]           Patient Information     Date Of Birth          1955        Visit Information        Provider Department      8/8/2017 8:15 AM Zbigniew Bruce MD [facility] Dermatology        Today's Diagnoses     Psoriasis of scalp          Care Instructions    Cryotherapy    What is it?    Use of a very cold liquid, such as liquid nitrogen, to freeze and destroy abnormal skin cells that need to be removed    What should I expect?    Tenderness and redness    A small blister that might grow and fill with dark purple blood. There may be crusting.    More than one treatment may be needed if the lesions do not go away.    How do I care for the treated area?    Gently wash the area with your hands when bathing.    Use a thin layer of Vaseline to help with healing. You may use a Band-Aid.     The area should heal within 7-10 days and may leave behind a pink or lighter color.     Do not use an antibiotic or Neosporin ointment.     You may take acetaminophen (Tylenol) for pain.     Call your Doctor if you have:    Severe pain    Signs of infection (warmth, redness, cloudy yellow drainage, and or a bad smell)    Questions or concerns    Who should I call with questions?       [facility]: [phone]       [facility]: [phone]       For urgent needs outside of business hours call the [facility] at [phone]        and ask for the dermatology resident on call              Follow-ups after your visit        Who to contact     Please call your clinic at [phone] to:    Ask questions about your health    Make or cancel appointments    Discuss your medicines    Learn about your test results    Speak to your doctor   If you have compliments or concerns about an experience at your clinic, or if you wish to file a complaint, please contact [facility]  Patient Relations at [phone] or email us at [email or web address]         Additional Information About Your Visit        [employer] Information     [employer] gives you secure access to your electronic health record. If you see a primary care provider, you can also send messages to your care team and make appointments. If you have questions, please call your primary care clinic.  If you do not have a primary care provider, please call [phone] and they will assist you.      [employer] is an electronic gateway that provides easy, online access to your medical records. With [employer], you can request a clinic appointment, read your test results, renew a prescription or communicate with your care team.     To access your existing account, please contact your [facility] or call [phone] for assistance.        Care EveryWhere ID     This is your Care EveryWhere ID. This could be used by other organizations to access your [place] medical records  [id number]         Blood Pressure from Last 3 Encounters:   09/06/16 132/82   06/13/16 119/78   06/04/16 (!) 145/91    Weight from Last 3 Encounters:   04/17/17 77.1 kg (170 lb)   09/06/16 78.9 kg (174 lb)   06/13/16 78.3 kg (172 lb 11.2 oz)              Today, you had the following     No orders found for display         Where to get your medicines      These medications were sent to [place], [place] - [address] [place] [address] [address] [place] [address] [address] [place], [place] [id number]    Hours:  TRANSPLANT PHONE NUMBER [phone] Phone:  [phone]     fluocinonide 0.05 % solution          Primary Care Provider Office Phone # Fax #    Lanre Garland [phone]       [address] [address]        Equal Access to Services     SAKSHI MCQUEEN : Desiree Preciado, chris saldaña, hal pope  martinacliffantwon anaya ah. So [facility] [phone].    ATENCIÓN: Si pollo montes, tiene a gray disposición servicios gratuitos de asistencia lingüística. Celeste al [phone].    We comply with applicable federal civil rights laws and [place] laws. We do not discriminate on the basis of race, color, national origin, age, disability sex, sexual orientation or gender identity.            Thank you!     Thank you for choosing [facility]  for your care. Our goal is always to provide you with excellent care. Hearing back from our patients is one way we can continue to improve our services. Please take a few minutes to complete the written survey that you may receive in the mail after your visit with us. Thank you!             Your Updated Medication List - Protect others around you: Learn how to safely use, store and throw away your medicines at www.disposemymeds.org.          This list is accurate as of: 8/8/17  8:33 AM.  Always use your most recent med list.                   Brand Name Dispense Instructions for use Diagnosis    ARTIFICIAL TEAR OP      Apply 1 drop to eye as needed        fluocinonide 0.05 % solution    LIDEX    60 mL    Apply topically 2 times daily    Psoriasis of scalp       ibuprofen 200 MG tablet    ADVIL/MOTRIN     Take 200 mg by mouth every 4 hours as needed        MELATONIN PO      Take 5 tablets by mouth At Bedtime        zaleplon 5 MG capsule    sonata    15 capsule    Take 1 capsule (5 mg) by mouth nightly as needed for sleep    Sleep disorder

## 2017-08-08 NOTE — NURSING NOTE
Dermatology Rooming Note    Eric Espinosa's goals for this visit include:   Chief Complaint   Patient presents with     Derm Problem     BCC that was removed from his nose in December of 2015. Eric notes that this area started bleeding last week. No pain in that area.     Willow Brooks, CMA

## 2017-08-08 NOTE — LETTER
8/8/2017       RE: Eric Espinosa  [address] [place] [address]     Dear Colleague,    Thank you for referring your patient, Eric Espinosa, to the [facility] DER[facility] at [facility]. Please see a copy of my visit note below.    [facility] Dermatology Note      Dermatology Problem List:  1. History of basal cell carcinoma of the nose, L superior shoulder ([facility] 2013), and forehead (2012 w/ Dr. Fernández in [place], [place]).  2. Hypertrophic scars of the left nasal alar and left superior shoulder: both improved w/o treatment, could consider CO2 laser to L nasal ala in future  3. Actinic keratoses  4. Hemangioma L shoudler s/p excision 4/26/2016 (suspected lymph node)  6. Intradermal melanocytic nevus: L lower cheek s/p bx 2/1/17  7. Seborrheic dermatitis vs psoriasis: patch on left temporal scalp with predominant scale; recommended Neutragena T-Justen Shapoo daily (then Bakers PNS if not improving)    Encounter Date: Aug 8, 2017    CC:   Chief Complaint   Patient presents with     Derm Problem     BCC that was removed from his nose in December of 2015. Eric notes that this area started bleeding last week. No pain in that area.         History of Present Illness:  Mr. Eric Espinosa is a 62 year old male who presents for evaluation of recent bleeding from his left nasal ala scar. Patient has a history of three BCCs with the most recent being on his left nasal ala that was treated with MMS in 2015. He had some nasal ala reconstructive surgery with Dr. John Beasley in December 2015. He reports that he was in his usual state of health until last week on 8/2 when he noticed that his left nasal scar became bright purple. The next day, on 8/3, the scar began to bleed from the back of the scar. It stopped bleeding on 8/4, and then bleed tiny amounts on 8/5. It has not bleed since. He says that today, the scar is pretty much back to normal except for a small scab at the  back of the scar. He is concerned that he may have recurrence of BCC within the scar.    He is otherwise feeling well today and denies fevers, chills, night sweats or recent unexplained weight loss.     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    Family History:  Family History   Problem Relation Age of Onset     CANCER No family hx of      No family history of skin cancer     Skin Cancer No family hx of      Glaucoma No family hx of      Macular Degeneration No family hx of          Medications:  Current Outpatient Prescriptions   Medication Sig Dispense Refill     zaleplon (SONATA) 5 MG capsule Take 1 capsule (5 mg) by mouth nightly as needed for sleep 15 capsule 0     ARTIFICIAL TEAR OP Apply 1 drop to eye as needed       fluocinonide (LIDEX) 0.05 % external solution Apply topically 2 times daily 60 mL 3     ibuprofen (ADVIL,MOTRIN) 200 MG tablet Take 200 mg by mouth every 4 hours as needed       MELATONIN PO Take 5 tablets by mouth At Bedtime          Allergies   Allergen Reactions     Ciprofloxacin Rash         Review of Systems:  -As per HPI  -Constitutional: The patient denies fatigue, fevers, chills, unintended weight loss, and night sweats.  -HEENT: Patient denies nonhealing oral sores.  -Skin: As above in HPI. No additional skin concerns.    Physical exam:  Vitals: There were no vitals taken for this visit.  GEN: This is a well developed, well-nourished male in no acute distress, in a pleasant mood.    SKIN: Focused examination of the  face was performed.  - There are two erythematous macules with overyling adherent scale on the forehead.   - Well healed graft scar on the left nasal ala with 1-2 mm linear scar tissue, also has 2 mm crusted papule at the posterior aspect of scar  -No other lesions of concern on areas examined.     Impression/Plan:  1. Actinic keratosis    Cryotherapy procedure note: After verbal consent and discussion of risks and benefits including but no limited to dyspigmentation/scar, blister, and pain, 2 was(were) treated with 1-2mm freeze border for 2 cycles with liquid nitrogen. Post cryotherapy instructions were provided.     2. Well healed graft scar with recent episode of bleeding: on exam today, no evidence of bleeding, however, small crusted papule near area of described bleeding. Bleeding may be due to either inflammation in scar or less likely recurrence of BCC. Discussed options of biopsy today versus watching and waiting given that it is now improving. Patient opted for watching and waiting. Clinical followup at next visit, sooner if increased symptoms.      Follow-up for previous 1 year skin check.    Dr. Bruce staffed the patient.    Staff Involved:  Resident(Santiago Orozco)/Staff(as above)    Santiago Orozco MD, PhD  Medicine-Dermatology PGY-2    I have seen and examined this patient and agree with the assessment and plan as documented in the resident's note, and was present for all procedures.    Zbigniew Bruce MD  Dermatology Attending

## 2017-08-30 PROBLEM — Z85.828 HISTORY OF BASAL CELL CANCER: Status: ACTIVE | Noted: 2017-08-30

## 2018-03-09 ENCOUNTER — OFFICE VISIT (OUTPATIENT)
Dept: DERMATOLOGY | Facility: CLINIC | Age: 63
End: 2018-03-09
Payer: COMMERCIAL

## 2018-03-09 DIAGNOSIS — L57.0 AK (ACTINIC KERATOSIS): Primary | ICD-10-CM

## 2018-03-09 ASSESSMENT — PAIN SCALES - GENERAL: PAINLEVEL: NO PAIN (0)

## 2018-03-09 NOTE — NURSING NOTE
Dermatology Rooming Note    Eric Espinosa's goals for this visit include:   Chief Complaint   Patient presents with     Derm Problem     Eric is here today to have a patch on his forehead looked at.      Maci Zuluaga MA

## 2018-03-09 NOTE — PROGRESS NOTES
CHIEF COMPLAINT:  Spots on forehead.      HISTORY OF PRESENT ILLNESS:  The patient is a 62-year-old male who presents to clinic today for spots on his forehead.  He does note 2 scaly lesions present.  They have been here for a month or 2.  He would also like me to examine the rest of his head.      PAST MEDICAL HISTORY:     1.  Basal cell carcinoma of the nose, left superior shoulder and forehead.     2.  Psoriasis.      MEDICATIONS:  Reviewed.  See medication list.      ALLERGIES:  Ciprofloxacin.      REVIEW OF SYSTEMS:  Denies pain, pruritus or bleeding.      PHYSICAL EXAMINATION:   GENERAL:  A well-appearing male in no acute distress, alert and oriented x3.   SKIN:  Examination of his head shows 2 pink, scaly papules on his left and right forehead.  The rest of the head and neck show scattered, 2-4 mm, light-brown, well-demarcated macules with no atypia seen under dermatoscopy.      ASSESSMENT AND PLAN:     1.  Two actinic keratoses of his forehead.  These were treated with liquid nitrogen cryotherapy.  If these do not resolve in 1-2 weeks, he is to notify me.  He was warned of the risk of post inflammatory hypopigmentation.   2.  Lentigines of his face, benign.   3.  The patient was encouraged to continue with yearly skin checks.     Rui Looney MD, MS, FAAD

## 2018-03-09 NOTE — LETTER
3/9/2018       RE: Eric Espinosa  [address]   [facility] [address]     Dear Colleague,    Thank you for referring your patient, Eric Espinosa, to the [facility] DERMATOLOGY at [facility]. Please see a copy of my visit note below.    CHIEF COMPLAINT:  Spots on forehead.      HISTORY OF PRESENT ILLNESS:  The patient is a 62-year-old male who presents to clinic today for spots on his forehead.  He does note 2 scaly lesions present.  They have been here for a month or 2.  He would also like me to examine the rest of his head.      PAST MEDICAL HISTORY:     1.  Basal cell carcinoma of the nose, left superior shoulder and forehead.     2.  Psoriasis.      MEDICATIONS:  Reviewed.  See medication list.      ALLERGIES:  Ciprofloxacin.      REVIEW OF SYSTEMS:  Denies pain, pruritus or bleeding.      PHYSICAL EXAMINATION:   GENERAL:  A well-appearing male in no acute distress, alert and oriented x3.   SKIN:  Examination of his head shows 2 pink, scaly papules on his left and right forehead.  The rest of the head and neck show scattered, 2-4 mm, light-brown, well-demarcated macules with no atypia seen under dermatoscopy.      ASSESSMENT AND PLAN:     1.  Two actinic keratoses of his forehead.  These were treated with liquid nitrogen cryotherapy.  If these do not resolve in 1-2 weeks, he is to notify me.  He was warned of the risk of post inflammatory hypopigmentation.   2.  Lentigines of his face, benign.   3.  The patient was encouraged to continue with yearly skin checks.     Rui Looney MD, MS, FAAD

## 2018-03-09 NOTE — MR AVS SNAPSHOT
After Visit Summary   3/9/2018    Eric Espinosa    MRN: [id number]           Patient Information     Date Of Birth          1955        Visit Information        Provider Department      3/9/2018 11:30 AM Rui Looney MD [facility] Dermatology        Today's Diagnoses     AK (actinic keratosis)    -  1       Follow-ups after your visit        Who to contact     Please call your clinic at [phone] to:    Ask questions about your health    Make or cancel appointments    Discuss your medicines    Learn about your test results    Speak to your doctor            Additional Information About Your Visit        MyChart Information     [employer] gives you secure access to your electronic health record. If you see a primary care provider, you can also send messages to your care team and make appointments. If you have questions, please call your primary care clinic.  If you do not have a primary care provider, please call [phone] and they will assist you.      [employer] is an electronic gateway that provides easy, online access to your medical records. With [employer], you can request a clinic appointment, read your test results, renew a prescription or communicate with your care team.     To access your existing account, please contact your [facility] or call [phone] for assistance.        Care EveryWhere ID     This is your Care EveryWhere ID. This could be used by other organizations to access your [facility] medical records  [id number]         Blood Pressure from Last 3 Encounters:   09/06/16 132/82   06/13/16 119/78   06/04/16 (!) 145/91    Weight from Last 3 Encounters:   04/17/17 77.1 kg (170 lb)   09/06/16 78.9 kg (174 lb)   06/13/16 78.3 kg (172 lb 11.2 oz)              We Performed the Following     DESTRUCT PREMALIGNANT LESION, 2-14     DESTRUCT PREMALIGNANT LESION, FIRST        Primary Care Provider Office Phone # Fax #    Lanre Garland [phone]  [phone]       [address] [address]        Equal Access to Services     SAKSHI MCQUEEN : Hadii aad ku hadangelique Preciado, wanurisda luqadaha, qaybta kaalmada gloryisabel, hal leein hayaakayy holdermook potter jaclyn cifuentes. [facility] [phone].    ATENCIÓN: Si habla español, tiene a gray disposición servicios gratuitos de asistencia lingüística. Llame al [phone].    We comply with applicable federal civil rights laws and [place] laws. We do not discriminate on the basis of race, color, national origin, age, disability, sex, sexual orientation, or gender identity.            Thank you!     Thank you for choosing [facility] DERMATOLOGY  for your care. Our goal is always to provide you with excellent care. Hearing back from our patients is one way we can continue to improve our services. Please take a few minutes to complete the written survey that you may receive in the mail after your visit with us. Thank you!             Your Updated Medication List - Protect others around you: Learn how to safely use, store and throw away your medicines at www.disposemymeds.org.          This list is accurate as of 3/9/18 11:59 PM.  Always use your most recent med list.                   Brand Name Dispense Instructions for use Diagnosis    ARTIFICIAL TEAR OP      Apply 1 drop to eye as needed        fluocinonide 0.05 % solution    LIDEX    60 mL    Apply topically 2 times daily    Psoriasis of scalp       ibuprofen 200 MG tablet    ADVIL/MOTRIN     Take 200 mg by mouth every 4 hours as needed        MELATONIN PO      Take 5 tablets by mouth At Bedtime        zaleplon 5 MG capsule    sonata    15 capsule    Take 1 capsule (5 mg) by mouth nightly as needed for sleep    Sleep disorder

## 2018-06-29 ENCOUNTER — TELEPHONE (OUTPATIENT)
Dept: INTERNAL MEDICINE | Facility: CLINIC | Age: 63
End: 2018-06-29

## 2018-06-29 DIAGNOSIS — G47.9 SLEEP DISORDER: ICD-10-CM

## 2018-06-29 NOTE — TELEPHONE ENCOUNTER
Health Call Center    Phone Message    May a detailed message be left on voicemail: no    Reason for Call: Other: Pt of Dr Garland calling about his Zalepion Rx, pharmacy said it's old Rn and needs a new one.  Pt wants to know does he have to see Dr. Garland before getting a new refill?  Please call pt back     Action Taken: Message routed to:  [facility] ([facility] PCC

## 2018-06-30 RX ORDER — ZALEPLON 5 MG/1
5 CAPSULE ORAL
Qty: 15 CAPSULE | Refills: 0 | Status: SHIPPED | OUTPATIENT
Start: 2018-06-30 | End: 2019-05-30

## 2018-06-30 NOTE — TELEPHONE ENCOUNTER
Printed out Rx and will leave on your desk    ARRON Garland    RX sent to [facility].  Zoe Talbot RN 7:03 AM on 7/2/2018.

## 2018-10-19 ENCOUNTER — OFFICE VISIT (OUTPATIENT)
Dept: DERMATOLOGY | Facility: CLINIC | Age: 63
End: 2018-10-19
Payer: COMMERCIAL

## 2018-10-19 DIAGNOSIS — Z85.828 HISTORY OF NONMELANOMA SKIN CANCER: Primary | ICD-10-CM

## 2018-10-19 DIAGNOSIS — L21.9 DERMATITIS, SEBORRHEIC: ICD-10-CM

## 2018-10-19 DIAGNOSIS — L82.1 SEBORRHEIC KERATOSIS: ICD-10-CM

## 2018-10-19 DIAGNOSIS — L57.8 SUN-DAMAGED SKIN: ICD-10-CM

## 2018-10-19 DIAGNOSIS — L85.3 XEROSIS CUTIS: ICD-10-CM

## 2018-10-19 DIAGNOSIS — D22.9 MULTIPLE BENIGN NEVI: ICD-10-CM

## 2018-10-19 DIAGNOSIS — L57.0 AK (ACTINIC KERATOSIS): ICD-10-CM

## 2018-10-19 PROBLEM — D23.9 DERMAL NEVUS: Status: ACTIVE | Noted: 2018-10-19

## 2018-10-19 ASSESSMENT — PAIN SCALES - GENERAL: PAINLEVEL: NO PAIN (0)

## 2018-10-19 NOTE — PROGRESS NOTES
[facility] Dermatology Note      Dermatology Problem List:  1. History of NMSC  -BCC of the nose s/p Mohs surgery 2015   -BCC, L superior shoulder ([facility] 2013)  -BCC forehead (2012 w/ Dr. Fernández in [place], [place]).  - Requires annual skin checks, last TBSE: 10/19/18   2. Actinic keratoses, forehead s/p cryo   3. Hemangioma L shoudler s/p excision 4/26/2016 (suspected lymph node)  4. Seborrheic dermatitis: patch on left temporal scalp with predominant scale  - Current tx: Alternate Selsan blue and head & shoulders for shampooing, lidex solution as needed (discussed 10/19/18)    Encounter Date: Oct 19, 2018    CC:  Chief Complaint   Patient presents with     Skin Check     new spot on forehead, cheek (has been bleeding), bumps on lower left corner of mouth, spots on back and behind R knee     History of Present Illness:  Mr. Eric Espinosa is a 63 year old male with history of multiple basal cell carcinomas s/p Mohs surgery most recently 2015 who presents as a follow-up for a skin check. He has several concerning lesions today. He was last seen on 3/9/18 with Dr. Looney at which time they performed cryo on 2 AKs. He denies any changes in his health. No recent illness or medication changes. He endorses concerning lesions on the left upper forehead, left cheek and left border of the mouth. In addition, he has experienced itching around the right lower back. Him and his wife do not see any overlying skin changes or evidence of rash in the area. He has used hydrocortisone on the area for severe itch without much improvement in the itch. Otherwise, he is doing well without other concerns or questions. He uses a lotion every day after showering on the body and face. He uses lidex solution for itchy/scaly scalp and shampoos with an over the counter product that has salicyclic acid in it.    No other concerns addressed.     Past Medical History:   Patient Active Problem List   Diagnosis     Psoriasis      Neoplasm of uncertain behavior of skin     AK (actinic keratosis)     BCC (basal cell carcinoma), trunk     Actinic keratosis     History of skin cancer     Diverticulitis     History of basal cell cancer     Past Medical History:   Diagnosis Date     Basal cell carcinoma      Skin cancer      Past Surgical History:   Procedure Laterality Date     MOHS MICROGRAPHIC PROCEDURE       Social History:  He works as a . No changes mentioned at today's visit.     Family History:  There is no family history of skin cancer.    Medications:  Current Outpatient Prescriptions   Medication Sig Dispense Refill     ARTIFICIAL TEAR OP Apply 1 drop to eye as needed       fluocinonide (LIDEX) 0.05 % solution Apply topically 2 times daily 60 mL 1     ibuprofen (ADVIL,MOTRIN) 200 MG tablet Take 200 mg by mouth every 4 hours as needed       MELATONIN PO Take 5 tablets by mouth At Bedtime       zaleplon (SONATA) 5 MG capsule Take 1 capsule (5 mg) by mouth nightly as needed for sleep 15 capsule 0     Allergies   Allergen Reactions     Ciprofloxacin Rash       Review of Systems:  -Constitutional:  Feeling well, in usual state of health.  -Skin:  As per HPI, no additional concerns.    Physical exam:  Vitals: There were no vitals taken for this visit.  GEN: This is a well developed, well-nourished male in no acute distress, in a pleasant mood.    SKIN: Total skin excluding the undergarment areas was performed. The exam included the head/face, neck, both arms, chest, back, abdomen, both legs, digits and/or nails.   - Alicia type II  - Well healed scars in areas of past skin cancers. No evidence of recurrence.  - Face is xerotic with evidence of sun damage and scattered hyperpigmented macules without overlying scale   - Three gritty, erythematous macules on the left upper forehead   - One gritty, erythematous macule on the left cheek. There are prominent telangiectasias superior to this.  - Waxy brown stuck on macules and  papules scattered throughout the back, chest scalp, face, shoulder, upper back consistent with SKs  - Scattered dome shaped erythematous vascular papules throughout the back and trunk   - Stuck on rough papule on the right medial leg consistent with SK  - Scattered benign appearing tan to dark brown hyperpigmented macules and papules with benign appearance under dermoscopy   - Erythematous patch on the posterior neck just inferior to the hair line without scale   - Erythematous patch on the left occiput and left vertex scalp with thick scale. Around half dollar sized.  - No scale in the external ears, at the glabella, or at the nasolabial folds   - Skin-colored to pink firm papules on the left lower corner of the mouth on the chin area with comma shaped vessels consistent with dermal nevi  - No other lesions of concern on areas examined    Impression/Plan:  1. History of NMSC s/p Mohs surgery with no evidence of recurrence on today's exam    I discussed the importance of sun protection and annual skin checks     He will see us back sooner or any concerning, new, or growing lesions     2. Actinic keratosis    Discussed precancerous nature. Recommended treatment to prevent progression to SCC.    Cryotherapy procedure note (performed by faculty): After verbal consent and discussion of risks and benefits including but no limited to dyspigmentation/scar, blister, infection, recurrence, 4 was(were) treated with 1-2mm freeze border for 2 cycles with liquid nitrogen on the upper left forehead. Post cryotherapy instructions were provided.     3. Seborrheic keratosis, non irritated    Benign nature was discussed. No further intervention required at this time    4. Xerosis cutis    Recommended he use cerave lotion on the face 1-2x/day      5. Seborrheic dermatitis    Recommended alternating two over the counter anti-dandruff shampoos with different active ingredients to prevent development of resistance.    Continue to use  lidex 0.05% solution PRN for itchy scale. Patient declined refills today. Ok to send one year if requested.    6. Multiple clinical benign nevi: No atypia on examination    Benign nature was discussed. No further intervention required at this time as they are not causing him any issues or irritation    7. Left lower back pruritis without evidence of overlying dermatitis or xerosis     I discussed that this could be secondary to nerve irritation in that area as there are no cutaneous findings.    Follow-up in 1 year, earlier for new or changing lesions.       Staff Involved:  I, Theodora Chandler, MS4, am acting as the scribe for Janette Blanco MD. All aspects of the exam and documentation were approved by the attending physician.    Theodora Chandler MS4   [facility]  Pager: [phone]    Staff Physician:  I was present with the medical student who participated in the service and in the documentation of the note. I have verified the history and personally performed the physical exam and medical decision making. I agree with the assessment and plan of care as documented in the note.     The procedure(s) was(were) performed by myself.  Cryotherapy procedure note: After verbal consent and discussion of risks and benefits, 4 AKs was(were) treated with liquid nitrogen cryotherapy for 2 freeze/thaw cycles with 1-2mm borders. Post-cryotherapy wound care instructions were discussed and provided in written format.     Janette Blanco MD    Department of Dermatology  [facility]: Phone: [phone], Fax:[phone]  [facility]: Phone: [phone], Fax: [phone]

## 2018-10-19 NOTE — NURSING NOTE
Chief Complaint   Patient presents with     Skin Check     new spot on forehead, cheek (has been bleeding), bumps on lower left corner of mouth, spots on back and behind R knee     Cori Cárdenas, EMT

## 2018-10-19 NOTE — LETTER
10/19/2018       RE: Eric Espinosa  [address] [place] [address]     Dear Colleague,    Thank you for referring your patient, Eric Espinosa, to the [facility] DERMATOLOGY at [facility]. Please see a copy of my visit note below.    [facility] Dermatology Note      Dermatology Problem List:  1. History of NMSC  -BCC of the nose s/p Mohs surgery 2015   -BCC, L superior shoulder ([facility] 2013)  -BCC forehead (2012 w/ Dr. Fernández in [place], [place]).  - Requires annual skin checks, last TBSE: 10/19/18   2. Actinic keratoses, forehead s/p cryo   3. Hemangioma L shoudler s/p excision 4/26/2016 (suspected lymph node)  4. Seborrheic dermatitis: patch on left temporal scalp with predominant scale  - Current tx: Alternate Selsan blue and head & shoulders for shampooing, lidex solution as needed (discussed 10/19/18)    Encounter Date: Oct 19, 2018    CC:  Chief Complaint   Patient presents with     Skin Check     new spot on forehead, cheek (has been bleeding), bumps on lower left corner of mouth, spots on back and behind R knee     History of Present Illness:  Mr. rEic Espinosa is a 63 year old male with history of multiple basal cell carcinomas s/p Mohs surgery most recently 2015 who presents as a follow-up for a skin check. He has several concerning lesions today. He was last seen on 3/9/18 with Dr. Looney at which time they performed cryo on 2 AKs. He denies any changes in his health. No recent illness or medication changes. He endorses concerning lesions on the left upper forehead, left cheek and left border of the mouth. In addition, he has experienced itching around the right lower back. Him and his wife do not see any overlying skin changes or evidence of rash in the area. He has used hydrocortisone on the area for severe itch without much improvement in the itch. Otherwise, he is doing well without other concerns or questions. He uses a lotion every day  after showering on the body and face. He uses lidex solution for itchy/scaly scalp and shampoos with an over the counter product that has salicyclic acid in it.    No other concerns addressed.     Past Medical History:   Patient Active Problem List   Diagnosis     Psoriasis     Neoplasm of uncertain behavior of skin     AK (actinic keratosis)     BCC (basal cell carcinoma), trunk     Actinic keratosis     History of skin cancer     Diverticulitis     History of basal cell cancer     Past Medical History:   Diagnosis Date     Basal cell carcinoma      Skin cancer      Past Surgical History:   Procedure Laterality Date     MOHS MICROGRAPHIC PROCEDURE       Social History:  He works as a . No changes mentioned at today's visit.     Family History:  There is no family history of skin cancer.    Medications:  Current Outpatient Prescriptions   Medication Sig Dispense Refill     ARTIFICIAL TEAR OP Apply 1 drop to eye as needed       fluocinonide (LIDEX) 0.05 % solution Apply topically 2 times daily 60 mL 1     ibuprofen (ADVIL,MOTRIN) 200 MG tablet Take 200 mg by mouth every 4 hours as needed       MELATONIN PO Take 5 tablets by mouth At Bedtime       zaleplon (SONATA) 5 MG capsule Take 1 capsule (5 mg) by mouth nightly as needed for sleep 15 capsule 0     Allergies   Allergen Reactions     Ciprofloxacin Rash       Review of Systems:  -Constitutional:  Feeling well, in usual state of health.  -Skin:  As per HPI, no additional concerns.    Physical exam:  Vitals: There were no vitals taken for this visit.  GEN: This is a well developed, well-nourished male in no acute distress, in a pleasant mood.    SKIN: Total skin excluding the undergarment areas was performed. The exam included the head/face, neck, both arms, chest, back, abdomen, both legs, digits and/or nails.   - Alicia type II  - Well healed scars in areas of past skin cancers. No evidence of recurrence.  - Face is xerotic with evidence of sun  damage and scattered hyperpigmented macules without overlying scale   - Three gritty, erythematous macules on the left upper forehead   - One gritty, erythematous macule on the left cheek. There are prominent telangiectasias superior to this.  - Waxy brown stuck on macules and papules scattered throughout the back, chest scalp, face, shoulder, upper back consistent with SKs  - Scattered dome shaped erythematous vascular papules throughout the back and trunk   - Stuck on rough papule on the right medial leg consistent with SK  - Scattered benign appearing tan to dark brown hyperpigmented macules and papules with benign appearance under dermoscopy   - Erythematous patch on the posterior neck just inferior to the hair line without scale   - Erythematous patch on the left occiput and left vertex scalp with thick scale. Around half dollar sized.  - No scale in the external ears, at the glabella, or at the nasolabial folds   - Skin-colored to pink firm papules on the left lower corner of the mouth on the chin area with comma shaped vessels consistent with dermal nevi  - No other lesions of concern on areas examined    Impression/Plan:  1. History of NMSC s/p Mohs surgery with no evidence of recurrence on today's exam    I discussed the importance of sun protection and annual skin checks     He will see us back sooner or any concerning, new, or growing lesions     2. Actinic keratosis    Discussed precancerous nature. Recommended treatment to prevent progression to SCC.    Cryotherapy procedure note (performed by faculty): After verbal consent and discussion of risks and benefits including but no limited to dyspigmentation/scar, blister, infection, recurrence, 4 was(were) treated with 1-2mm freeze border for 2 cycles with liquid nitrogen on the upper left forehead. Post cryotherapy instructions were provided.     3. Seborrheic keratosis, non irritated    Benign nature was discussed. No further intervention required at this  time    4. Xerosis cutis    Recommended he use cerave lotion on the face 1-2x/day      5. Seborrheic dermatitis    Recommended alternating two over the counter anti-dandruff shampoos with different active ingredients to prevent development of resistance.    Continue to use lidex 0.05% solution PRN for itchy scale. Patient declined refills today. Ok to send one year if requested.    6. Multiple clinical benign nevi: No atypia on examination    Benign nature was discussed. No further intervention required at this time as they are not causing him any issues or irritation    7. Left lower back pruritis without evidence of overlying dermatitis or xerosis     I discussed that this could be secondary to nerve irritation in that area as there are no cutaneous findings.    Follow-up in 1 year, earlier for new or changing lesions.       Staff Involved:  I, Theodora Chandler, MS4, am acting as the scribe for Janette Blanco MD. All aspects of the exam and documentation were approved by the attending physician.    Theodora Chandler MS4   [facility]  Pager: [phone]    Staff Physician:  I was present with the medical student who participated in the service and in the documentation of the note. I have verified the history and personally performed the physical exam and medical decision making. I agree with the assessment and plan of care as documented in the note.     The procedure(s) was(were) performed by myself.  Cryotherapy procedure note: After verbal consent and discussion of risks and benefits, 4 AKs was(were) treated with liquid nitrogen cryotherapy for 2 freeze/thaw cycles with 1-2mm borders. Post-cryotherapy wound care instructions were discussed and provided in written format.     Janette Blanco MD    Department of Dermatology  [facility]: Phone: [phone], Fax:[phone]  [facility]: Phone: [phone], Fax: [phone]

## 2018-10-19 NOTE — PATIENT INSTRUCTIONS
It was a pleasure to see you in clinic today    We discussed the following:   - For your scalp, I would like you to alternate the Selsan blue shampoo with a shampoo with another mechanism of action, such as Head & Shoulders   - Please use a stronger moisturizer on your face twice daily such as a cream-based lotion that come in tubs. I recommend CeraVe   - The lesions on the left of your mouth are called dermal nevi (benign moles)   - We did cryotherapy on 4 spots on the upper left forehead, which are called actinic keratosis. I have added information on this below.   - Please see us yearly for skin checks, we will send out a reminder a few months before that appt should be scheduled   - Overall, I found nothing concerning during today's skin check - I do not see any lesions requiring biopsy.   - Please return if you ever notice any new or changing lesions       CRYOTHERAPY    What is it?    Use of a very cold liquid, such as liquid nitrogen, to freeze and destroy abnormal skin cells that need to be removed    What should I expect?    Tenderness and redness    A small blister that might grow and fill with dark purple blood. There may be crusting.    More than one treatment may be needed if the lesions do not go away.    How do I care for the treated area?    Gently wash the area with your hands when bathing.    Use a thin layer of Vaselin to help with healing. You may use a Band-Aid.     The area should heal within 7-10 days.    Do not use an antibiotic or Neosporin ointment.     You may take acetaminophen (Tylenol) for pain.     Call your Doctor if you have:    Severe pain    Signs of infection (warmth, redness, cloudy yellow drainage, and or a bad smell)    Questions or concerns    Contact infromation:  [facility] [phone]  [facility] [phone]        Understanding Actinic Keratosis     Wear a hat that protects your face and ears from the sun.      Actinic keratosis is a skin growth caused by sun damage. These growths are not cancer, but they may develop into skin cancer (precancerous). Many people get these growths, especially as they age. This is because of cumulative sun exposure over years. Multiple growths are called actinic keratoses.  What causes actinic keratosis?  Sun damage causes actinic keratosis. These growths usually appear on skin that is most often exposed to the sun, such as the face, ears, or back of the hands. People who easily sunburn are likely to develop actinic keratoses. Actinic keratoses often appear later in life from cumulative, extensive sun exposure.  What are the symptoms of actinic keratosis?  Actinic keratosis growths may be described as:    Rough, like sandpaper    Wart-like    Scaly or scabby    More easily felt than seen  They may appear singly or in clusters. They may start out as red patches, and the skin around them may be discolored.  Actinic keratoses usually are not painful. For some people they may make the skin feel tender.  How is actinic keratosis treated?  Because actinic keratoses may develop into skin cancer, a healthcare provider should look at them. Your healthcare provider may choose to remove one or more of these growths. It may be examined under a microscope. This is called a biopsy. You may also wish to have actinic keratoses removed if they bother you. They can be removed in several ways:    By using a medicine on the skin such as 5 fluorouracil or imiquimod    By removing them with a scalpel    By freezing them with liquid nitrogen  How can I prevent actinic keratoses?  Avoiding sun damage to your skin is the best way to prevent actinic keratoses. Here are some ways to protect your skin:    Use sunscreen with an SPF of 30 or higher on exposed skin when you are outside.    Wear a hat to protect your face and scalp. Consider wearing clothing that covers your arms and legs.    Avoid the sun in the middle  of the day, when sunlight is most direct.    Be aware of how long you have been out in the sun. Reapply sunscreen according to package directions.    Do not use tanning beds.  What are the complications of actinic keratosis?  Actinic keratoses are a sign of skin damage. They may become cancerous. It s a good idea to ask your healthcare provider to check new skin growths. Report any skin problem that concerns you.  When should I call my healthcare provider?  Call your healthcare provider right away if any of these occur:    You have an actinic keratosis sore that does not respond to treatment    You have an actinic keratosis sore that does not heal within a few weeks, or heals and then comes back    You have a skin growth that is changing in size, shape, or color    You have a skin growth that looks different on one side from the other    You have a skin growth that is not the same color throughout   Date Last Reviewed: 5/1/2016 2000-2017 [employer]. [address] [place] [address], [place] [address]. All rights reserved. This information is not intended as a substitute for professional medical care. Always follow your healthcare professional's instructions.

## 2018-10-19 NOTE — MR AVS SNAPSHOT
After Visit Summary   10/19/2018    Eric Espinosa    MRN: [id number]           Patient Information     Date Of Birth          1955        Visit Information        Provider Department      10/19/2018 9:15 AM Janette Blanco MD [facility]        Care Instructions    It was a pleasure to see you in clinic today    We discussed the following:   - For your scalp, I would like you to alternate the Selsan blue shampoo with a shampoo with another mechanism of action, such as Head & Shoulders   - Please use a stronger moisturizer on your face twice daily such as a cream-based lotion that come in tubs. I recommend CeraVe   - The lesions on the left of your mouth are called dermal nevi (benign moles)   - We did cryotherapy on 4 spots on the upper left forehead, which are called actinic keratosis. I have added information on this below.   - Please see us yearly for skin checks, we will send out a reminder a few months before that appt should be scheduled   - Overall, I found nothing concerning during today's skin check - I do not see any lesions requiring biopsy.   - Please return if you ever notice any new or changing lesions       CRYOTHERAPY    What is it?    Use of a very cold liquid, such as liquid nitrogen, to freeze and destroy abnormal skin cells that need to be removed    What should I expect?    Tenderness and redness    A small blister that might grow and fill with dark purple blood. There may be crusting.    More than one treatment may be needed if the lesions do not go away.    How do I care for the treated area?    Gently wash the area with your hands when bathing.    Use a thin layer of Vaselin to help with healing. You may use a Band-Aid.     The area should heal within 7-10 days.    Do not use an antibiotic or Neosporin ointment.     You may take acetaminophen (Tylenol) for pain.     Call your Doctor if you have:    Severe pain    Signs of infection (warmth, redness, cloudy yellow  drainage, and or a bad smell)    Questions or concerns    Contact infromation:  [facility] [phone]  [facility] [phone]        Understanding Actinic Keratosis     Wear a hat that protects your face and ears from the sun.     Actinic keratosis is a skin growth caused by sun damage. These growths are not cancer, but they may develop into skin cancer (precancerous). Many people get these growths, especially as they age. This is because of cumulative sun exposure over years. Multiple growths are called actinic keratoses.  What causes actinic keratosis?  Sun damage causes actinic keratosis. These growths usually appear on skin that is most often exposed to the sun, such as the face, ears, or back of the hands. People who easily sunburn are likely to develop actinic keratoses. Actinic keratoses often appear later in life from cumulative, extensive sun exposure.  What are the symptoms of actinic keratosis?  Actinic keratosis growths may be described as:    Rough, like sandpaper    Wart-like    Scaly or scabby    More easily felt than seen  They may appear singly or in clusters. They may start out as red patches, and the skin around them may be discolored.  Actinic keratoses usually are not painful. For some people they may make the skin feel tender.  How is actinic keratosis treated?  Because actinic keratoses may develop into skin cancer, a healthcare provider should look at them. Your healthcare provider may choose to remove one or more of these growths. It may be examined under a microscope. This is called a biopsy. You may also wish to have actinic keratoses removed if they bother you. They can be removed in several ways:    By using a medicine on the skin such as 5 fluorouracil or imiquimod    By removing them with a scalpel    By freezing them with liquid nitrogen  How can I prevent actinic keratoses?  Avoiding sun damage to your skin is the  best way to prevent actinic keratoses. Here are some ways to protect your skin:    Use sunscreen with an SPF of 30 or higher on exposed skin when you are outside.    Wear a hat to protect your face and scalp. Consider wearing clothing that covers your arms and legs.    Avoid the sun in the middle of the day, when sunlight is most direct.    Be aware of how long you have been out in the sun. Reapply sunscreen according to package directions.    Do not use tanning beds.  What are the complications of actinic keratosis?  Actinic keratoses are a sign of skin damage. They may become cancerous. It s a good idea to ask your healthcare provider to check new skin growths. Report any skin problem that concerns you.  When should I call my healthcare provider?  Call your healthcare provider right away if any of these occur:    You have an actinic keratosis sore that does not respond to treatment    You have an actinic keratosis sore that does not heal within a few weeks, or heals and then comes back    You have a skin growth that is changing in size, shape, or color    You have a skin growth that looks different on one side from the other    You have a skin growth that is not the same color throughout   Date Last Reviewed: 5/1/2016 2000-2017 [employer]. [address] [place] [address], [place] [address]. All rights reserved. This information is not intended as a substitute for professional medical care. Always follow your healthcare professional's instructions.                      Follow-ups after your visit        Follow-up notes from your care team     Return in about 1 year (around 10/19/2019).      Who to contact     Please call your clinic at [phone] to:    Ask questions about your health    Make or cancel appointments    Discuss your medicines    Learn about your test results    Speak to your doctor            Additional Information About Your Visit        [employer] Information     [employer] gives you secure  access to your electronic health record. If you see a primary care provider, you can also send messages to your care team and make appointments. If you have questions, please call your primary care clinic.  If you do not have a primary care provider, please call [phone] and they will assist you.      [employer] is an electronic gateway that provides easy, online access to your medical records. With [employer], you can request a clinic appointment, read your test results, renew a prescription or communicate with your care team.     To access your existing account, please contact your [facility] or call [phone] for assistance.        Care EveryWhere ID     This is your Care EveryWhere ID. This could be used by other organizations to access your [facility] medical records  [id number]         Blood Pressure from Last 3 Encounters:   09/06/16 132/82   06/13/16 119/78   06/04/16 (!) 145/91    Weight from Last 3 Encounters:   04/17/17 77.1 kg (170 lb)   09/06/16 78.9 kg (174 lb)   06/13/16 78.3 kg (172 lb 11.2 oz)              Today, you had the following     No orders found for display       Primary Care Provider Office Phone # Fax #    Lanre Garland [phone]       [address] [address]        Equal Access to Services     SAKSHI MCQUEEN : Hadii aad ku hadasho Soomaali, waaxda luqadaha, qaybta kaalmada adeegyada, hal cifuentes. So [facility] [phone].    ATENCIÓN: Si habla español, tiene a gray disposición servicios gratuitos de asistencia lingüística. Llame al [phone].    We comply with applicable federal civil rights laws and [place] laws. We do not discriminate on the basis of race, color, national origin, age, disability, sex, sexual orientation, or gender identity.            Thank you!     Thank you for choosing [facility]  for your care. Our goal is always to provide you with excellent care.  Hearing back from our patients is one way we can continue to improve our services. Please take a few minutes to complete the written survey that you may receive in the mail after your visit with us. Thank you!             Your Updated Medication List - Protect others around you: Learn how to safely use, store and throw away your medicines at www.disposemymeds.org.          This list is accurate as of 10/19/18  9:49 AM.  Always use your most recent med list.                   Brand Name Dispense Instructions for use Diagnosis    ARTIFICIAL TEAR OP      Apply 1 drop to eye as needed        fluocinonide 0.05 % solution    LIDEX    60 mL    Apply topically 2 times daily    Psoriasis of scalp       ibuprofen 200 MG tablet    ADVIL/MOTRIN     Take 200 mg by mouth every 4 hours as needed        MELATONIN PO      Take 5 tablets by mouth At Bedtime        zaleplon 5 MG capsule    sonata    15 capsule    Take 1 capsule (5 mg) by mouth nightly as needed for sleep    Sleep disorder

## 2019-02-23 ENCOUNTER — OFFICE VISIT (OUTPATIENT)
Dept: URGENT CARE | Facility: URGENT CARE | Age: 64
End: 2019-02-23
Payer: COMMERCIAL

## 2019-02-23 ENCOUNTER — ANCILLARY PROCEDURE (OUTPATIENT)
Dept: GENERAL RADIOLOGY | Facility: CLINIC | Age: 64
End: 2019-02-23
Attending: FAMILY MEDICINE
Payer: COMMERCIAL

## 2019-02-23 VITALS
WEIGHT: 173 LBS | SYSTOLIC BLOOD PRESSURE: 107 MMHG | DIASTOLIC BLOOD PRESSURE: 69 MMHG | HEIGHT: 71 IN | OXYGEN SATURATION: 97 % | BODY MASS INDEX: 24.22 KG/M2 | TEMPERATURE: 98.4 F | HEART RATE: 72 BPM

## 2019-02-23 DIAGNOSIS — J98.8 RESPIRATORY INFECTION: ICD-10-CM

## 2019-02-23 DIAGNOSIS — J02.9 SORE THROAT: ICD-10-CM

## 2019-02-23 DIAGNOSIS — R05.9 COUGH: ICD-10-CM

## 2019-02-23 DIAGNOSIS — J98.8 RESPIRATORY INFECTION: Primary | ICD-10-CM

## 2019-02-23 LAB
DEPRECATED S PYO AG THROAT QL EIA: NORMAL
SPECIMEN SOURCE: NORMAL

## 2019-02-23 PROCEDURE — 99214 OFFICE O/P EST MOD 30 MIN: CPT | Performed by: FAMILY MEDICINE

## 2019-02-23 PROCEDURE — 71046 X-RAY EXAM CHEST 2 VIEWS: CPT

## 2019-02-23 PROCEDURE — 87081 CULTURE SCREEN ONLY: CPT | Performed by: FAMILY MEDICINE

## 2019-02-23 PROCEDURE — 87880 STREP A ASSAY W/OPTIC: CPT | Performed by: FAMILY MEDICINE

## 2019-02-23 RX ORDER — BENZONATATE 200 MG/1
200 CAPSULE ORAL EVERY 8 HOURS PRN
Qty: 21 CAPSULE | Refills: 0 | Status: SHIPPED | OUTPATIENT
Start: 2019-02-23 | End: 2019-05-29

## 2019-02-23 ASSESSMENT — MIFFLIN-ST. JEOR: SCORE: 1603.91

## 2019-02-23 NOTE — PROGRESS NOTES
"SUBJECTIVE:   Eric Espinosa is a 63 year old male presenting with   Chief Complaint   Patient presents with     Urgent Care     Pt in clinic to have eval for sore throat, fever, cough, and congestion for 4 days.  Pt states he has had exposure to pneumonia via wife.     Pharyngitis     Nasal Congestion     Cough     Fever     Symptoms started 2/19 and include: cough, congestion, sore throat and feverish last night.  tmax 100.3.  No chest pain or tightness, but feels like he sounds \"wheezy\" with phlegmy cough.  No dyspnea.  No vomiting or diarrhea.  His wife was sick with similar symptoms a week ago, tested negative for influenza, but diagnosed with bilateral pneumonia.  He is here today to be checked for pneumonia.     Predisposing factors include:  Non smoker, no h/o asthma.    ROS:  5-Point Review of Systems Negative-- Except as stated above.    OBJECTIVE  /69   Pulse 72   Temp 98.4  F (36.9  C) (Tympanic)   Ht 1.791 m (5' 10.5\")   Wt 78.5 kg (173 lb)   SpO2 97%   BMI 24.47 kg/m    GENERAL:  Awake, alert and interactive. No acute distress.  HEENT:   NC/AT, EOMI, clear conjunctiva.  Clear nasal discharge.  Oropharynx moist and clear.  TM's and EAC's benign.  NECK: supple and free of adenopathy  CHEST:  Lungs are clear, no rhonchi, wheezing or rales. Normal symmetric air entry throughout both lung fields.   HEART:  S1 and S2 normal, no murmurs. Regular rate and rhythm.    Labs:  Results for orders placed or performed in visit on 02/23/19   Strep, Rapid Screen   Result Value Ref Range    Specimen Description Throat     Rapid Strep A Screen       NEGATIVE: No Group A streptococcal antigen detected by immunoassay, await culture report.     CXR - no infiltrate or effusion noted.  Preliminary reading came back while patient still in the office and reviewed as well.        ASSESSMENT/PLAN    ICD-10-CM    1. Respiratory infection J98.8 XR Chest 2 Views   2. Sore throat J02.9 Strep, Rapid Screen     Beta strep " group A culture   3. Cough R05 benzonatate (TESSALON) 200 MG capsule       Viral infection suspected.   We discussed the expected course of the illness and symptomatic cares in detail.   Advised to return to care if symptoms not improving as expected, do not resolve completely, or if any new or worsening symptoms develop.    Patient Instructions   If any fevers (a temperature of 100.5 or higher) develop after the weekend, or if any worsening symptoms or not improving as expected, recheck with your primary provider.     You can take the cough suppressant up to every 8 hours as needed.

## 2019-02-23 NOTE — PATIENT INSTRUCTIONS
If any fevers (a temperature of 100.5 or higher) develop after the weekend, or if any worsening symptoms or not improving as expected, recheck with your primary provider.     You can take the cough suppressant up to every 8 hours as needed.

## 2019-02-24 LAB
BACTERIA SPEC CULT: NORMAL
SPECIMEN SOURCE: NORMAL

## 2019-05-28 ASSESSMENT — ENCOUNTER SYMPTOMS
FEVER: 1
ABDOMINAL PAIN: 1
FATIGUE: 1
BLOOD IN STOOL: 1

## 2019-05-29 ENCOUNTER — OFFICE VISIT (OUTPATIENT)
Dept: INTERNAL MEDICINE | Facility: CLINIC | Age: 64
End: 2019-05-29
Payer: COMMERCIAL

## 2019-05-29 VITALS
WEIGHT: 170.9 LBS | TEMPERATURE: 97.7 F | HEIGHT: 70 IN | OXYGEN SATURATION: 98 % | SYSTOLIC BLOOD PRESSURE: 120 MMHG | DIASTOLIC BLOOD PRESSURE: 79 MMHG | HEART RATE: 59 BPM | BODY MASS INDEX: 24.47 KG/M2

## 2019-05-29 DIAGNOSIS — Z12.5 SCREENING FOR PROSTATE CANCER: Primary | ICD-10-CM

## 2019-05-29 DIAGNOSIS — Z11.59 ENCOUNTER FOR HEPATITIS C SCREENING TEST FOR LOW RISK PATIENT: ICD-10-CM

## 2019-05-29 DIAGNOSIS — Z13.220 SCREENING CHOLESTEROL LEVEL: ICD-10-CM

## 2019-05-29 DIAGNOSIS — N40.0 BENIGN PROSTATIC HYPERPLASIA WITHOUT LOWER URINARY TRACT SYMPTOMS: ICD-10-CM

## 2019-05-29 ASSESSMENT — MIFFLIN-ST. JEOR: SCORE: 1590.2

## 2019-05-29 ASSESSMENT — PAIN SCALES - GENERAL: PAINLEVEL: NO PAIN (0)

## 2019-05-29 NOTE — NURSING NOTE
Chief Complaint   Patient presents with     Physical     pt here for physical     Medication Request     pt would like to discuss medications       Arminda Granados CMA at 12:12 PM on 5/29/2019.

## 2019-05-29 NOTE — PROGRESS NOTES
HPI  Eric Espinosa is a 62 yo male with history of diverticulitis, psoriasis and BCC who presents to clinic today physical.  He had colonoscopy 8/30/16 with tubular adenoma and diverticulosis and recommend 5 year repeat. No change in family history. He does not smoke nor abuse EtOH. No new OTC medications.  He uses infrequent Sonata. He would either like to continue on this and/or try Rozerem for sleep. He has seen Dermatology 10/19/2018. He has a localized L back skin area with itching. He states he did discuss this with dermatology and they felt it was NOT skin related. He has been  Using Capsasin cream for about 2 weeks. No other skin complaints. O/w no additional HEENT, cardiopulmonary, abdominal, , neurological, systemic, endocrine, lymphatic, systemic, psychiatric, musculoskeletal complaints. He has no further sxs. Related to diverticulitis. He had normal CXR in 2/23/2019    Past Medical History:   Diagnosis Date     Basal cell carcinoma      Skin cancer      Past Surgical History:   Procedure Laterality Date     MOHS MICROGRAPHIC PROCEDURE       Current Outpatient Medications   Medication     ARTIFICIAL TEAR OP     fluocinonide (LIDEX) 0.05 % solution     ibuprofen (ADVIL,MOTRIN) 200 MG tablet     MELATONIN PO     zaleplon (SONATA) 5 MG capsule     No current facility-administered medications for this visit.        PE:    Vitals noted gen, nad cooperative alert, HEENT, ears normal, oropharynx clear, no exudate, neck supple, normal rom, no adenopathy, LCTA, B, good air movement, L back area w/o any skin findings. No chest wall tenderness to palpation, no masses, abdomen, nt, nd, no masses, no acute findings, Normal neurological exam.     A/P:    1. Previous hematuria, recheck UA today. He was seen by Dr. Hernandez 6/4/2016  2. Will check PSA today  3. Colonoscopy as above  4. He will continue to follow in Dermatology  5. L back chest wall itching. He may have a variant of neurodermatitis. CXR normal as above.  If continues or worse could consider CT and/or MRI imaging of thoracic spine, chest, abdomen. Recommended he could see Neurology as well for possible trail(s) of neuropathic medications. He wants to wait about 3 months. I recommended if worse to see be back in clinic  6. No further episodes of diverticulitis  7. Will give him Rx for Sonata and/or Rozerem.   8. Ordered Hep C screen  9. Ordered lipids

## 2019-05-30 ENCOUNTER — MYC MEDICAL ADVICE (OUTPATIENT)
Dept: INTERNAL MEDICINE | Facility: CLINIC | Age: 64
End: 2019-05-30

## 2019-05-30 ENCOUNTER — TELEPHONE (OUTPATIENT)
Dept: INTERNAL MEDICINE | Facility: CLINIC | Age: 64
End: 2019-05-30

## 2019-05-30 DIAGNOSIS — Z12.5 SCREENING FOR PROSTATE CANCER: ICD-10-CM

## 2019-05-30 DIAGNOSIS — Z13.220 SCREENING CHOLESTEROL LEVEL: ICD-10-CM

## 2019-05-30 DIAGNOSIS — N40.0 BENIGN PROSTATIC HYPERPLASIA WITHOUT LOWER URINARY TRACT SYMPTOMS: ICD-10-CM

## 2019-05-30 DIAGNOSIS — Z11.59 ENCOUNTER FOR HEPATITIS C SCREENING TEST FOR LOW RISK PATIENT: ICD-10-CM

## 2019-05-30 DIAGNOSIS — G47.9 SLEEP DISORDER: ICD-10-CM

## 2019-05-30 LAB
ALBUMIN SERPL-MCNC: 4 G/DL (ref 3.4–5)
ALBUMIN UR-MCNC: NEGATIVE MG/DL
ALP SERPL-CCNC: 70 U/L (ref 40–150)
ALT SERPL W P-5'-P-CCNC: 39 U/L (ref 0–70)
ANION GAP SERPL CALCULATED.3IONS-SCNC: 4 MMOL/L (ref 3–14)
APPEARANCE UR: ABNORMAL
AST SERPL W P-5'-P-CCNC: 25 U/L (ref 0–45)
BASOPHILS # BLD AUTO: 0 10E9/L (ref 0–0.2)
BASOPHILS NFR BLD AUTO: 0.6 %
BILIRUB SERPL-MCNC: 0.6 MG/DL (ref 0.2–1.3)
BILIRUB UR QL STRIP: NEGATIVE
BUN SERPL-MCNC: 14 MG/DL (ref 7–30)
CALCIUM SERPL-MCNC: 9.1 MG/DL (ref 8.5–10.1)
CHLORIDE SERPL-SCNC: 106 MMOL/L (ref 94–109)
CHOLEST SERPL-MCNC: 186 MG/DL
CO2 SERPL-SCNC: 29 MMOL/L (ref 20–32)
COLOR UR AUTO: YELLOW
CREAT SERPL-MCNC: 0.75 MG/DL (ref 0.66–1.25)
DIFFERENTIAL METHOD BLD: NORMAL
EOSINOPHIL # BLD AUTO: 0 10E9/L (ref 0–0.7)
EOSINOPHIL NFR BLD AUTO: 0.8 %
ERYTHROCYTE [DISTWIDTH] IN BLOOD BY AUTOMATED COUNT: 12.8 % (ref 10–15)
GFR SERPL CREATININE-BSD FRML MDRD: >90 ML/MIN/{1.73_M2}
GLUCOSE SERPL-MCNC: 98 MG/DL (ref 70–99)
GLUCOSE UR STRIP-MCNC: NEGATIVE MG/DL
HCT VFR BLD AUTO: 44.6 % (ref 40–53)
HCV AB SERPL QL IA: NONREACTIVE
HDLC SERPL-MCNC: 33 MG/DL
HGB BLD-MCNC: 14.9 G/DL (ref 13.3–17.7)
HGB UR QL STRIP: NEGATIVE
IMM GRANULOCYTES # BLD: 0 10E9/L (ref 0–0.4)
IMM GRANULOCYTES NFR BLD: 0.2 %
KETONES UR STRIP-MCNC: NEGATIVE MG/DL
LDLC SERPL CALC-MCNC: 131 MG/DL
LEUKOCYTE ESTERASE UR QL STRIP: NEGATIVE
LYMPHOCYTES # BLD AUTO: 1.6 10E9/L (ref 0.8–5.3)
LYMPHOCYTES NFR BLD AUTO: 33.9 %
MCH RBC QN AUTO: 31.4 PG (ref 26.5–33)
MCHC RBC AUTO-ENTMCNC: 33.4 G/DL (ref 31.5–36.5)
MCV RBC AUTO: 94 FL (ref 78–100)
MONOCYTES # BLD AUTO: 0.4 10E9/L (ref 0–1.3)
MONOCYTES NFR BLD AUTO: 7.8 %
MUCOUS THREADS #/AREA URNS LPF: PRESENT /LPF
NEUTROPHILS # BLD AUTO: 2.7 10E9/L (ref 1.6–8.3)
NEUTROPHILS NFR BLD AUTO: 56.7 %
NITRATE UR QL: NEGATIVE
NONHDLC SERPL-MCNC: 153 MG/DL
NRBC # BLD AUTO: 0 10*3/UL
NRBC BLD AUTO-RTO: 0 /100
PH UR STRIP: 6 PH (ref 5–7)
PLATELET # BLD AUTO: 217 10E9/L (ref 150–450)
POTASSIUM SERPL-SCNC: 4.5 MMOL/L (ref 3.4–5.3)
PROT SERPL-MCNC: 7.3 G/DL (ref 6.8–8.8)
PSA SERPL-MCNC: 0.63 UG/L (ref 0–4)
RBC # BLD AUTO: 4.74 10E12/L (ref 4.4–5.9)
RBC #/AREA URNS AUTO: 1 /HPF (ref 0–2)
SODIUM SERPL-SCNC: 140 MMOL/L (ref 133–144)
SOURCE: ABNORMAL
SP GR UR STRIP: 1.01 (ref 1–1.03)
TRIGL SERPL-MCNC: 111 MG/DL
UROBILINOGEN UR STRIP-MCNC: 0 MG/DL (ref 0–2)
WBC # BLD AUTO: 4.7 10E9/L (ref 4–11)
WBC #/AREA URNS AUTO: <1 /HPF (ref 0–5)

## 2019-05-30 RX ORDER — ZALEPLON 5 MG/1
5 CAPSULE ORAL
Qty: 15 CAPSULE | Refills: 0 | Status: SHIPPED | OUTPATIENT
Start: 2019-05-30 | End: 2020-05-25

## 2019-05-30 RX ORDER — RAMELTEON 8 MG/1
8 TABLET ORAL
Qty: 15 TABLET | Refills: 0 | Status: SHIPPED | OUTPATIENT
Start: 2019-05-30 | End: 2020-07-07

## 2019-05-30 NOTE — TELEPHONE ENCOUNTER
RX for Sonta and Ramelteon signed and sent to [facility].  Zoe Talbot RN 3:42 PM on 5/30/2019.

## 2019-11-01 ENCOUNTER — OFFICE VISIT (OUTPATIENT)
Dept: DERMATOLOGY | Facility: CLINIC | Age: 64
End: 2019-11-01
Payer: COMMERCIAL

## 2019-11-01 VITALS — SYSTOLIC BLOOD PRESSURE: 106 MMHG | DIASTOLIC BLOOD PRESSURE: 71 MMHG | HEART RATE: 65 BPM

## 2019-11-01 DIAGNOSIS — D48.5 NEOPLASM OF UNCERTAIN BEHAVIOR OF SKIN: Primary | ICD-10-CM

## 2019-11-01 DIAGNOSIS — L82.0 INFLAMED SEBORRHEIC KERATOSIS: ICD-10-CM

## 2019-11-01 DIAGNOSIS — L57.0 ACTINIC KERATOSIS: ICD-10-CM

## 2019-11-01 ASSESSMENT — PAIN SCALES - GENERAL: PAINLEVEL: NO PAIN (0)

## 2019-11-01 NOTE — LETTER
11/1/2019       RE: Eric Espinosa  [address] [place] [address]     Dear Colleague,    Thank you for referring your patient, Eric Espinosa, to the [facility] DERMATOLOGY at [facility]. Please see a copy of my visit note below.    [facility] Dermatology Note      Dermatology Problem List:  1. History of NMSC  -BCC of the nose s/p Mohs surgery 2015   -BCC, L superior shoulder ([facility] 2013)  -BCC forehead (2012 w/ Dr. Fernández in [place], [place]).  - Requires annual skin checks, last TBSE:  11/1/19   2. Actinic keratoses, forehead s/p cryo   3. Hemangioma L shoudler s/p excision 4/26/2016 (suspected lymph node)  4. Lesion on the central forehead- BCC vs sebaceous hyperplasia- s/p biopsy 11/1/19  Seborrheic dermatitis: patch on left temporal scalp with predominant scale  - Current tx: Alternate Selsan blue and head & shoulders for shampooing, lidex solution as needed (discussed 10/19/18)    Encounter Date: Nov 1, 2019    CC:  Chief Complaint   Patient presents with     Skin Check     Annual mole check; PT concerned about new rash on shoulder         History of Present Illness:  Mr. Eric Espinosa is a 64 year old male who presents for a skin check. The patient reports that he has a lesion of concern on his right shoulder that he noticed about a week ago which he would like evaluated. He states that the lesion is not very symptomatic, and it doesn't seem to be growing in size. He states that he has used topical fluocinonide for seborrheic dermatitis on his scalp in the past, but he states that he has not needed this recently as his seborrheic dermatitis is not active. On further questioning he was not previously aware of a small pink papule on the central forehead. It is asymptomatic and has been present for an unknown duration. The patient voices no other concerns.      Past Medical History:   Patient Active Problem List   Diagnosis     Psoriasis      Neoplasm of uncertain behavior of skin     AK (actinic keratosis)     BCC (basal cell carcinoma), trunk     Actinic keratosis     History of skin cancer     Diverticulitis     History of basal cell cancer     Dermal nevus     Dermatitis, seborrheic     Past Medical History:   Diagnosis Date     Basal cell carcinoma      Skin cancer      Past Surgical History:   Procedure Laterality Date     MOHS MICROGRAPHIC PROCEDURE         Social History:  Patient reports that he has never smoked. He has never used smokeless tobacco. He reports current alcohol use. He reports that he does not use drugs.    Family History:  Family History   Problem Relation Age of Onset     Cancer No family hx of         No family history of skin cancer     Skin Cancer No family hx of      Glaucoma No family hx of      Macular Degeneration No family hx of        Medications:  Current Outpatient Medications   Medication Sig Dispense Refill     ARTIFICIAL TEAR OP Apply 1 drop to eye as needed       fluocinonide (LIDEX) 0.05 % solution Apply topically 2 times daily (Patient not taking: Reported on 11/1/2019) 60 mL 1     ibuprofen (ADVIL,MOTRIN) 200 MG tablet Take 200 mg by mouth every 4 hours as needed       MELATONIN PO Take 5 tablets by mouth At Bedtime       ramelteon (ROZEREM) 8 MG tablet Take 1 tablet (8 mg) by mouth nightly as needed for sleep (Patient not taking: Reported on 11/1/2019) 15 tablet 0     zaleplon (SONATA) 5 MG capsule Take 1 capsule (5 mg) by mouth nightly as needed for sleep (Patient not taking: Reported on 11/1/2019) 15 capsule 0       Allergies   Allergen Reactions     Ciprofloxacin Rash       Review of Systems:  -Constitutional: Otherwise feeling well today, in usual state of health.  -HEENT: Patient denies nonhealing oral sores.  -Skin: As above in HPI. No additional skin concerns.    Physical exam:  Vitals: /71 (BP Location: Right arm, Patient Position: Sitting, Cuff Size: Adult Regular)   Pulse 65   GEN: This is a  well developed, well-nourished male in no acute distress, in a pleasant mood.    SKIN: Full skin, which includes the head/face, both arms, chest, back, abdomen,both legs, genitalia and/or groin buttocks, digits and/or nails, was examined.  -Alicia skin type: II  -There are erythematous macules with overyling adherent scale on the forehead x2.  -on the central forehead, there is a pearly pink papule  -There is a tan to brown waxy stuck on papule with surrounding erythema on the R superior shoulder.   -There are waxy stuck on tan to brown papules on the trunk and extremities.  -No other lesions of concern on areas examined.       Impression/Plan:  1. Lesion on the central forehead. The Ddx includes basal cell vs sebaceous hyperplasia    Shave biopsy:  After discussion of benefits and risks including but not limited to bleeding/bruising, pain/swelling, infection, scar, incomplete removal, nerve damage/numbness, recurrence, and non-diagnostic biopsy, written consent, verbal consent and photographs were obtained. Time-out was performed. The area was cleaned with isopropyl alcohol. 0.5ml of 1% lidocaine with 1:100,000 epinephrine was injected to obtain adequate anesthesia. A shave biopsy was performed. Hemostasis was achieved with aluminium chloride. Vaseline and a sterile dressing were applied. The patient tolerated the procedure and no complications were noted. The patient was provided with verbal and written post care instructions.    2. Actinic keratosis    Cryotherapy procedure note: After verbal consent and discussion of risks and benefits including but no limited to dyspigmentation/scar, blister, and pain, 2 was(were) treated with 1-2mm freeze border for 2 cycles with liquid nitrogen. Post cryotherapy instructions were provided.    3. Inflamed seborrheic keratosis on the R superior shoulder  Cryotherapy procedure note: After verbal consent and discussion of risks and benefits including but no limited to  dyspigmentation/scar, blister, and pain, 1 was(were) treated with 1-2mm freeze border for 2 cycles with liquid nitrogen. Post cryotherapy instructions were provided.     4. History of nonmelanoma skin cancer (NMSC), last lesion identified and treated in 2015  - no evidence of recurrent disease via inspection or palpation; all sites well-healed  - photoprotection discussed (SPF30+ sunscreen and proper use, UPF clothing, sun avoidance, tanning bed avoidance)    - continued skin surveillance recommended    5. Seborrheic keratoses    Discussed the natural etiology and provided reassurances about the benign nature of the lesion.     Follow-up prn for new or changing lesions pending biopsy results.       Staff Involved:  Scribe/Staff    Scribe Disclosure  I, Dominic Najjar, am serving as a scribe to document services personally performed by Dr. Lanre Carlin MD, based on data collection and the provider's statements to me.    Provider Disclosure:   The documentation recorded by the scribe accurately reflects the services I personally performed and the decisions made by me.    Lanre Carlin MD   of Dermatology  Department of Dermatology  [facility] [employer]

## 2019-11-01 NOTE — PROGRESS NOTES
[facility] Dermatology Note      Dermatology Problem List:  1. History of NMSC  -BCC of the nose s/p Mohs surgery 2015   -BCC, L superior shoulder ([facility] 2013)  -BCC forehead (2012 w/ Dr. Fernández in [place], [place]).  - Requires annual skin checks, last TBSE: 11/1/19   2. Actinic keratoses, forehead s/p cryo   3. Hemangioma L shoudler s/p excision 4/26/2016 (suspected lymph node)  4. Lesion on the central forehead- BCC vs sebaceous hyperplasia- s/p biopsy 11/1/19  Seborrheic dermatitis: patch on left temporal scalp with predominant scale  - Current tx: Alternate Selsan blue and head & shoulders for shampooing, lidex solution as needed (discussed 10/19/18)    Encounter Date: Nov 1, 2019    CC:  Chief Complaint   Patient presents with     Skin Check     Annual mole check; PT concerned about new rash on shoulder         History of Present Illness:  Mr. Eric Espinosa is a 64 year old male who presents for a skin check. The patient reports that he has a lesion of concern on his right shoulder that he noticed about a week ago which he would like evaluated. He states that the lesion is not very symptomatic, and it doesn't seem to be growing in size. He states that he has used topical fluocinonide for seborrheic dermatitis on his scalp in the past, but he states that he has not needed this recently as his seborrheic dermatitis is not active. On further questioning he was not previously aware of a small pink papule on the central forehead. It is asymptomatic and has been present for an unknown duration. The patient voices no other concerns.      Past Medical History:   Patient Active Problem List   Diagnosis     Psoriasis     Neoplasm of uncertain behavior of skin     AK (actinic keratosis)     BCC (basal cell carcinoma), trunk     Actinic keratosis     History of skin cancer     Diverticulitis     History of basal cell cancer     Dermal nevus     Dermatitis, seborrheic     Past Medical History:    Diagnosis Date     Basal cell carcinoma      Skin cancer      Past Surgical History:   Procedure Laterality Date     MOHS MICROGRAPHIC PROCEDURE         Social History:  Patient reports that he has never smoked. He has never used smokeless tobacco. He reports current alcohol use. He reports that he does not use drugs.    Family History:  Family History   Problem Relation Age of Onset     Cancer No family hx of         No family history of skin cancer     Skin Cancer No family hx of      Glaucoma No family hx of      Macular Degeneration No family hx of        Medications:  Current Outpatient Medications   Medication Sig Dispense Refill     ARTIFICIAL TEAR OP Apply 1 drop to eye as needed       fluocinonide (LIDEX) 0.05 % solution Apply topically 2 times daily (Patient not taking: Reported on 11/1/2019) 60 mL 1     ibuprofen (ADVIL,MOTRIN) 200 MG tablet Take 200 mg by mouth every 4 hours as needed       MELATONIN PO Take 5 tablets by mouth At Bedtime       ramelteon (ROZEREM) 8 MG tablet Take 1 tablet (8 mg) by mouth nightly as needed for sleep (Patient not taking: Reported on 11/1/2019) 15 tablet 0     zaleplon (SONATA) 5 MG capsule Take 1 capsule (5 mg) by mouth nightly as needed for sleep (Patient not taking: Reported on 11/1/2019) 15 capsule 0       Allergies   Allergen Reactions     Ciprofloxacin Rash       Review of Systems:  -Constitutional: Otherwise feeling well today, in usual state of health.  -HEENT: Patient denies nonhealing oral sores.  -Skin: As above in HPI. No additional skin concerns.    Physical exam:  Vitals: /71 (BP Location: Right arm, Patient Position: Sitting, Cuff Size: Adult Regular)   Pulse 65   GEN: This is a well developed, well-nourished male in no acute distress, in a pleasant mood.    SKIN: Full skin, which includes the head/face, both arms, chest, back, abdomen,both legs, genitalia and/or groin buttocks, digits and/or nails, was examined.  -Alicia skin type: II  -There  are erythematous macules with overyling adherent scale on the forehead x2.  -on the central forehead, there is a pearly pink papule  -There is a tan to brown waxy stuck on papule with surrounding erythema on the R superior shoulder.   -There are waxy stuck on tan to brown papules on the trunk and extremities.  -No other lesions of concern on areas examined.       Impression/Plan:  1. Lesion on the central forehead. The Ddx includes basal cell vs sebaceous hyperplasia    Shave biopsy:  After discussion of benefits and risks including but not limited to bleeding/bruising, pain/swelling, infection, scar, incomplete removal, nerve damage/numbness, recurrence, and non-diagnostic biopsy, written consent, verbal consent and photographs were obtained. Time-out was performed. The area was cleaned with isopropyl alcohol. 0.5ml of 1% lidocaine with 1:100,000 epinephrine was injected to obtain adequate anesthesia. A shave biopsy was performed. Hemostasis was achieved with aluminium chloride. Vaseline and a sterile dressing were applied. The patient tolerated the procedure and no complications were noted. The patient was provided with verbal and written post care instructions.    2. Actinic keratosis    Cryotherapy procedure note: After verbal consent and discussion of risks and benefits including but no limited to dyspigmentation/scar, blister, and pain, 2 was(were) treated with 1-2mm freeze border for 2 cycles with liquid nitrogen. Post cryotherapy instructions were provided.    3. Inflamed seborrheic keratosis on the R superior shoulder  Cryotherapy procedure note: After verbal consent and discussion of risks and benefits including but no limited to dyspigmentation/scar, blister, and pain, 1 was(were) treated with 1-2mm freeze border for 2 cycles with liquid nitrogen. Post cryotherapy instructions were provided.     4. History of nonmelanoma skin cancer (NMSC), last lesion identified and treated in 2015  - no evidence of  recurrent disease via inspection or palpation; all sites well-healed  - photoprotection discussed (SPF30+ sunscreen and proper use, UPF clothing, sun avoidance, tanning bed avoidance)    - continued skin surveillance recommended    5. Seborrheic keratoses    Discussed the natural etiology and provided reassurances about the benign nature of the lesion.     Follow-up prn for new or changing lesions pending biopsy results.       Staff Involved:  Scribe/Staff    Scribe Disclosure  I, Dominic Najjar, am serving as a scribe to document services personally performed by Dr. Lanre Carlin MD, based on data collection and the provider's statements to me.    Provider Disclosure:   The documentation recorded by the scribe accurately reflects the services I personally performed and the decisions made by me.    Lanre Carlin MD   of Dermatology  Department of Dermatology  [facility] [employer] [facility]

## 2019-11-01 NOTE — PATIENT INSTRUCTIONS

## 2019-11-01 NOTE — NURSING NOTE
Dermatology Rooming Note    Eric Espinosa's goals for this visit include:   Chief Complaint   Patient presents with     Skin Check     Annual mole check; PT concerned about new rash on shoulder     Ashley Ayala. EMT

## 2019-11-05 LAB — COPATH REPORT: NORMAL

## 2020-02-10 ENCOUNTER — MYC MEDICAL ADVICE (OUTPATIENT)
Dept: INTERNAL MEDICINE | Facility: CLINIC | Age: 65
End: 2020-02-10

## 2020-02-21 ENCOUNTER — OFFICE VISIT (OUTPATIENT)
Dept: INTERNAL MEDICINE | Facility: CLINIC | Age: 65
End: 2020-02-21
Payer: COMMERCIAL

## 2020-02-21 VITALS
OXYGEN SATURATION: 100 % | HEART RATE: 65 BPM | DIASTOLIC BLOOD PRESSURE: 80 MMHG | WEIGHT: 169 LBS | BODY MASS INDEX: 24.09 KG/M2 | SYSTOLIC BLOOD PRESSURE: 123 MMHG

## 2020-02-21 DIAGNOSIS — M79.671 RIGHT FOOT PAIN: Primary | ICD-10-CM

## 2020-02-21 DIAGNOSIS — M54.50 ACUTE BILATERAL LOW BACK PAIN WITHOUT SCIATICA: ICD-10-CM

## 2020-02-21 ASSESSMENT — PAIN SCALES - GENERAL: PAINLEVEL: MILD PAIN (2)

## 2020-02-21 NOTE — PATIENT INSTRUCTIONS
[facility] Medication Refill Request Information:  * Please contact your pharmacy regarding ANY request for medication refills.  ** [facility] Prescription Fax = [phone]  * Please allow 3 business days for routine medication refills.  * Please allow 5 business days for controlled substance medication refills.     [facility] Test Result notification information:  *You will be notified with in 7-10 days of your appointment day regarding the results of your test.  If you are on MyChart you will be notified as soon as the provider has reviewed the results and signed off on them.    [facility]: [phone]

## 2020-02-21 NOTE — PROGRESS NOTES
Chief Complaint:   1. Right ankle pain  2. Low back pain  3. Insomnia    SUBJECTIVE    HPI:  1. Right ankle pain  Patient reports an rita pain in the right medial ankle which began two months ago. Pain is continuous and is exacerbated with walking or exercise and is relieved with rest. Patient routinely exercises on an elliptical 5 days per week but has stopped this in attempts to rest his ankle. He has not used any topical or oral analgesics for this pain. Reports feeling similar pain when diagnosed with tenosynovitis of the same ankle in 2017. At that time he saw Dr. Leone with Sports Medicine. Pain was eventually relieved with use of an oral antiinflammatory.     2. Low back pain  Low back pain began 6 weeks ago. This is intermittent, worse after sitting or lying flat. Pain is located in the bilateral lower back and described as aching. Activity relieves discomfort. Patient has used topical capsaicin cream with mild relief. He feels slow improvement in this discomfort over the past few weeks.    3. Insomnia  Patient currently takes melatonin as well as OTC supplements for insomnia. He also uses sonata sparingly, using 1 prescription (30 tablets) per year. He has an upcoming trip to [facility] planned in May and inquired about using ambien for this trip. He has used ambien for insomnia with travel/flights in the past.    Past Medical History:   Diagnosis Date     Basal cell carcinoma      Skin cancer      Past Surgical History:   Procedure Laterality Date     MOHS MICROGRAPHIC PROCEDURE       Current Outpatient Medications   Medication     ARTIFICIAL TEAR OP     ibuprofen (ADVIL,MOTRIN) 200 MG tablet     MELATONIN PO     ramelteon (ROZEREM) 8 MG tablet     fluocinonide (LIDEX) 0.05 % solution     zaleplon (SONATA) 5 MG capsule     No current facility-administered medications for this visit.         Allergies   Allergen Reactions     Ciprofloxacin Rash     Most Recent Immunizations   Administered Date(s) Administered      Influenza (IIV3) PF 11/01/2013     Tdap (Adacel,Boostrix) 08/05/2010     Zoster vaccine recombinant adjuvanted (SHINGRIX) 05/13/2019       ROS:  Constitutional: No weight loss, fatigue, fever or night sweats.   HEENT: No blurred vision, tearing or redness. No hearing loss or tinnitus. No nasal congestion or epistaxis. No neck stiffness of pain.  Cardiac: No chest pain or palpitations.  Respiratory: No shortness of breath, cough, hemoptysis, or expectoration.  GI: No nausea, vomiting, diarrhea, constipation, melena, or hematochezia.  Genitourinary: No hesitancy, urgency, dysuria or hematuria.   Musculoskeletal: Positive for pain and stiffness in the medial low back. Postive for right medial ankle pain.  Skin/Integumentary: No rashes, dryness, color change or abnormalities of hair or nails  Neurological: No weakness or numbness of extremities, no headache, dizziness, no loss of consciousness.  Psych: No changes in memory, concentration or changes in activities.      OBJECTIVE  /80 (BP Location: Right arm, Patient Position: Sitting, Cuff Size: Adult Regular)   Pulse 65   Wt 76.7 kg (169 lb)   SpO2 100%   BMI 24.09 kg/m      Physical Exam:  General: Pleasant male, appears stated age in no acute distress.Alert and cooperative with examination..  Respiratory: Symmetric thoracic expansion. Lungs clear to auscultation without crackles, wheezes or rhonchi.   Cardiac: RRR, S1 and S2 auscultated without murmurs, click or rubs. Peripheral pulses palpable and equal bilaterally.   Musculoskeletal: ROM to right ankle limited by pain. Tenderness to palpation of right heel and medial malleolus. Slight ecchymosis present on right medial ankle.   Neurologic: Alert and oriented.   Pysch: Pleasant mood and affect.       ASSESSMENT/PLAN:  1. Right ankle pain  Possible tendinopathy due to overuse/activity with elliptical. Differential diagnoses include fracture or arthritis. Will obtain x-ray imaging for further evaluation  as well as orthopedic referral.  2. Low back pain  Slowly improving. Patient denies radiculopathy. Red flag symptoms including bowel or bladder changes, falls, gait changes or LE numbness/tingling absent. Possible compensation in gait due to pain in right ankle. Will obtain imaging of pelvis, hips and lumbar spine for further evaluation.  3. Insomnia  Patient will be provided with limited supply of ambien for insomnia due to travel.       Ethel Jaime RN  Nurse Practitioner Student  [facility]    Staff note; I personally reviewed past medical history including past orthopedic note from Dr. Leone 2017. I interviewed pt. And conducted a physical examination. I agree with the above assessment and plan.     ARRON Garland MD    Total time spent 25 minutes.  More than 50% of the time spent with Mr. Espinosa on counseling / coordinating his care

## 2020-02-24 NOTE — TELEPHONE ENCOUNTER
DIAGNOSIS: Right foot pain , Referred by Dr. Garland. imaging being done 02/26/2020   APPOINTMENT DATE: Mar 30, 2020    NOTES STATUS DETAILS   OFFICE NOTE from referring provider Internal 2/21/20 Dr. Garland   OFFICE NOTE from other specialist N/A 4/17/17 Dr. Leone   DISCHARGE SUMMARY from hospital N/A    DISCHARGE REPORT from the ER N/A    OPERATIVE REPORT N/A    MEDICATION LIST Internal    IMPLANT RECORD/STICKER N/A    LABS     CBC/DIFF N/A    CULTURES N/A    INJECTIONS DONE IN RADIOLOGY N/A    MRI N/A    CT SCAN N/A    XRAYS (IMAGES & REPORTS) Internal Internal 2/21/20, 4/17/17   TUMOR     PATHOLOGY  Slides & report N/A

## 2020-02-26 ENCOUNTER — MYC MEDICAL ADVICE (OUTPATIENT)
Dept: INTERNAL MEDICINE | Facility: CLINIC | Age: 65
End: 2020-02-26

## 2020-02-26 ENCOUNTER — ANCILLARY PROCEDURE (OUTPATIENT)
Dept: GENERAL RADIOLOGY | Facility: CLINIC | Age: 65
End: 2020-02-26
Attending: INTERNAL MEDICINE
Payer: COMMERCIAL

## 2020-02-26 DIAGNOSIS — M54.50 ACUTE BILATERAL LOW BACK PAIN WITHOUT SCIATICA: ICD-10-CM

## 2020-02-26 DIAGNOSIS — E78.5 HYPERLIPIDEMIA LDL GOAL <100: ICD-10-CM

## 2020-02-26 DIAGNOSIS — Z13.220 SCREENING CHOLESTEROL LEVEL: Primary | ICD-10-CM

## 2020-02-26 DIAGNOSIS — M79.671 RIGHT FOOT PAIN: ICD-10-CM

## 2020-02-26 DIAGNOSIS — G47.9 SLEEP DISORDER: Primary | ICD-10-CM

## 2020-02-27 RX ORDER — ZOLPIDEM TARTRATE 5 MG/1
5 TABLET ORAL
Qty: 30 TABLET | Refills: 1 | Status: SHIPPED | OUTPATIENT
Start: 2020-02-27 | End: 2021-06-24

## 2020-02-27 NOTE — TELEPHONE ENCOUNTER
I recommend Eric start on at least 20 mg Atorvastatin and then recheck lipid panel an liver tests in a month or so. Please share this information with him    Thanks, ARRON Garland

## 2020-02-28 RX ORDER — ATORVASTATIN CALCIUM 20 MG/1
20 TABLET, FILM COATED ORAL DAILY
Qty: 90 TABLET | Refills: 3 | Status: SHIPPED | OUTPATIENT
Start: 2020-02-28 | End: 2021-04-19

## 2020-02-28 NOTE — TELEPHONE ENCOUNTER
DIAGNOSIS: Lumbar back pain for last 2 months. XR taken 2/26, recs in chart. No other imaging, no prev surgery. Sched per pt.    APPOINTMENT DATE: 3/17   NOTES STATUS DETAILS   OFFICE NOTE from referring provider N/A    OFFICE NOTE from other specialist Internal  Lanre Garland MD         DISCHARGE SUMMARY from hospital N/A    DISCHARGE REPORT from the ER N/A    OPERATIVE REPORT N/A    MEDICATION LIST Internal    MRI N/A    CT SCAN N/A    XRAYS (IMAGES & REPORTS) Internal 2/26/20

## 2020-03-02 ENCOUNTER — HEALTH MAINTENANCE LETTER (OUTPATIENT)
Age: 65
End: 2020-03-02

## 2020-03-04 ENCOUNTER — PRE VISIT (OUTPATIENT)
Dept: ORTHOPEDICS | Facility: CLINIC | Age: 65
End: 2020-03-04

## 2020-03-13 ENCOUNTER — VIRTUAL VISIT (OUTPATIENT)
Dept: FAMILY MEDICINE | Facility: OTHER | Age: 65
End: 2020-03-13

## 2020-03-14 NOTE — PROGRESS NOTES
"Date: 2020 14:10:47  Clinician: Etta Dong  Clinician NPI: [id number]  Patient: Eric Espinosa  Patient : 1955  Patient Address: [facility] [address], [place] [address]  Patient Phone: [phone]  Visit Protocol: URI  Patient Summary:  Eric is a 64 year old ( : 1955 ) male who initiated a Visit for COVID-19 (Coronavirus) evaluation and screening. When asked the question \"Please sign me up to receive news, health information and promotions from [employer].\", Eric responded \"No\".    Eric states his symptoms started gradually 3-6 days ago.   His symptoms consist of a sore throat, nasal congestion, malaise, and rhinitis.   Symptom details     Nasal secretions: The color of his mucus is yellow.    Sore throat: Eric reports having mild throat pain (1-3 on a 10 point pain scale), does not have exudate on his tonsils, and can swallow liquids. He is not sure if the lymph nodes in his neck are enlarged. A rash has not appeared on the skin since the sore throat started.      Eric denies having chills, wheezing, cough, fever, teeth pain, ear pain, headache, facial pain or pressure, and myalgias. He also denies taking antibiotic medication for the symptoms, having recent facial or sinus surgery in the past 60 days, and double sickening (worsening symptoms after initial improvement). He is not experiencing dyspnea.   Precipitating events  Within the past week, Eric has not been exposed to someone with strep throat.   Pertinent COVID-19 (Coronavirus) information  Eric has not traveled internationally or to the areas where COVID-19 (Coronavirus) is widespread in the last 14 days before the start of his symptoms.   Eric has not had close contact with a laboratory-confirmed positive COVID-19 patient or someone under quarantine for suspected COVID-19 within 14 days of symptom onset.   Eric is not a healthcare worker or does not work in a healthcare facility.   Pertinent medical history  Eric does not need a return to " work/school note.   Weight: 170 lbs   Eric does not smoke or use smokeless tobacco.   Weight: 170 lbs    MEDICATIONS: zaleplon oral, zolpidem oral, atorvastatin oral, ALLERGIES: ciprofloxacin  Clinician Response:  Dear Eric,  Based on the information provided, you have a viral upper respiratory infection, otherwise known as a cold. Symptoms vary from person to person, but can include sneezing, coughing, a runny nose, sore throat, and headache and range from mild to severe.  Unfortunately, there are no medications that can cure a cold, so treatment is focused on controlling symptoms as much as possible. Most people gradually feel better until symptoms are gone in 1-2 weeks.  Medication information  Because you have a viral infection, antibiotics will not help you get better. Treating a viral infection with antibiotics could actually make you feel worse.  Self care  The following tips will keep you as comfortable as possible while you recover:     Rest    Drink plenty of water and other liquids    Take a hot shower to loosen congestion    Use throat lozenges    Gargle with warm salt water (1/4 teaspoon of salt per 8 ounce glass of water)    Suck on frozen items such as popsicles or ice cubes    Drink hot tea with lemon and honey     When to seek care  Please be seen in a clinic or urgent care if new symptoms develop, or symptoms become worse.  Call 911 or go to the emergency room if you feel that your throat is closing off, you suddenly develop a rash, you are unable to swallow fluids, you are drooling, or you are having difficulty breathing.  COVID-19 (Coronavirus) General Information  With the increase in the number of COVID-19 (Coronavirus) cases, we understand you may have some questions. Below is some helpful information on COVID-19 (Coronavirus).  How can I protect myself and others from the COVID-19 (Coronavirus)?  Because there is currently no vaccine to prevent infection, the best way to protect yourself is to  avoid being exposed to this virus. Put distance between yourself and other people if COVID-19 (Coronavirus) is spreading in your community. The virus is thought to spread mainly from person-to-person.     Between people who are in close contact with one another (within about 6 about) for prolonged period (10 minutes or longer).    Through respiratory droplets produced when an infected person coughs or sneezes.     The CDC recommends the following additional steps to protect yourself and others:     Wash your hands often with soap and water for at least 20 seconds, especially after blowing your nose, coughing, or sneezing; going to the bathroom; and before eating or preparing food.  Use an alcohol-based hand  that contains at least 60 percent alcohol if soap and water are not available.        Avoid touching your eyes, nose and mouth with unwashed hands.    Avoid close contact with people who are sick.    Stay home when you are sick.    Cover your cough or sneeze with a tissue, then throw the tissue in the trash.    Clean and disinfect frequently touched objects and surfaces.     You can help stop COVID-19 (Coronavirus) by knowing the signs and symptoms:     Fever    Cough    Shortness of breath     Contact your healthcare provider if   Develop symptoms   AND   Have been in close contact with a person known to have COVID-19 (Coronavirus) or live in or have recently traveled from an area with ongoing spread of COVID-19 (Coronavirus). Call ahead before you go to a doctor's office or emergency room. Tell them about your recent travel and your symptoms.   For the most up to date information, visit the CDC's website.  Steps to help prevent the spread of COVID-19 (Coronavirus) if you are sick  If you are sick with COVID-19 (Coronavirus) or suspect you are infected with the virus that causes COVID-19 (Coronavirus), follow the steps below to help prevent the disease from spreading&nbsp;to people in your home and  "community.     Stay home except to get medical care. Home isolation may be started in consultation with your healthcare clinician.    Separate yourself from other people and animals in your home.    Call ahead before visiting your doctor if you have a medical appointment.    Wear a facemask when you are around other people.    Cover your cough and sneezes.    Clean your hands often.    Avoid sharing personal household items.    Clean and disinfect frequently touched objects and surfaces everyday.    You will need to have someone drop off medications or household supplies (if needed) at your house without coming inside or in contact with you or others living in your house.    Monitor your symptoms and seek prompt medical care if your illness is worsening (e.g. Difficulty breathing).    Discontinue home isolation only in consultation with your healthcare provider.     For more detailed and up to date information on what to do if you are sick, visit this link: What to Do If You Are Sick With Coronavirus Disease 2019 (COVID-19).  Do I need to be tested for COVID-19 (Coronavirus)?     At this time, the limited number of tests available are controlled by the state and local health departments and are being reserved for more seriously ill patients, those with known exposure to confirmed patients, and those with recent travel (within 14 days) to countries with high rates of COVID-19 (Coronavirus).    Decisions on which patients receive testing will be based on the local spread of COVID-19 (Coronavirus) as well as the symptoms. Your healthcare provider will make the final decision on whether you should be tested.    In the meantime, if you have concerns that you may have been exposed, it is reasonable to practice \"social distancing.\"&nbsp; If you are ill with a cold or flu-like illness, please monitor your symptoms and reach out to your healthcare provider if your symptoms worsen.    For more up to date information, visit " this link: COVID-19 (Coronavirus) Frequently Asked Questions and Answers.      Diagnosis: Viral URI  Diagnosis ICD: J06.9

## 2020-03-30 ENCOUNTER — PRE VISIT (OUTPATIENT)
Dept: ORTHOPEDICS | Facility: CLINIC | Age: 65
End: 2020-03-30

## 2020-04-02 ENCOUNTER — TELEPHONE (OUTPATIENT)
Dept: ORTHOPEDICS | Facility: CLINIC | Age: 65
End: 2020-04-02

## 2020-04-02 NOTE — TELEPHONE ENCOUNTER
Called and talked to patient.    Explained that due to COVID-19, [facility] is taking steps to try to limit potential patient exposures by reducing face to face visits. We have reviewed schedules with Dr. Smith and together feel that their follow-up appointment, currently scheduled on 4/13/20, would need to be rescheduled to a further date.     Patient proceeding with rescheduling appointment.    Their appointment was rescheduled for: 5/18/20    All of their questions were answered at this time, they will call with any new questions that may arise.    CELESTE Marina

## 2020-04-16 ENCOUNTER — VIRTUAL VISIT (OUTPATIENT)
Dept: INTERNAL MEDICINE | Facility: CLINIC | Age: 65
End: 2020-04-16
Payer: COMMERCIAL

## 2020-04-16 DIAGNOSIS — J02.9 SORE THROAT: Primary | ICD-10-CM

## 2020-04-16 ASSESSMENT — PAIN SCALES - GENERAL: PAINLEVEL: NO PAIN (0)

## 2020-04-16 NOTE — NURSING NOTE
64 year old  Chief Complaint   Patient presents with     Throat Problem     Discuss throat pain.       There were no vitals taken for this visit. There is no height or weight on file to calculate BMI.  BP completed using cuff size:      THIAGO Best  April 16, 2020 8:51 AM

## 2020-04-16 NOTE — PROGRESS NOTES
Telephone visit    Mr. Espinosa agrees to a telephone visit    He had a telephone visit on 3/13/2020 for sore throat. He has h/o increased cholesterol on Atorvastatin. He takes Zolpidem for sleep and sometimes Zaleplon for sleep as well. He states sore throat for 5-6 weeks. Along with this he has chronic URI sxs. No F/C/NS. He has some nasal congestion. No cough or SOB. He had tried a course of Clindamycin about 2 weeks ago and this did not reduce his sxs. He then stopped his Atorvastatin and this did not reduce his sore throat. No dysphagia. He is eating and drinking fine. He thinks some change in voice and hoarseness. He sent in a picture of his throat 4/15/2020 and he has some erythema. He continues to have joint and back complaints. He states he is uncircumcised and has some issues with balanitis (currently) and he uses hydrocortisone cream. His joint complaints have been going on for more than one year. No h/o Rheumatological issues (I.e. no Shelly's). He does not have reflux and has not tried a PPI. He does not have seasonal allergies. He does have a runny nose however and some sinus fullness. No other HEENT, cardiopulmonary, abdominal, , neurological, systemic, psychiatric, lymphatic, endocrine complaints. I will see him tomorrow in clinic for further evaluation and testing.    ARRON Garland MD    Spent 21 minutes and 5 seconds on the phone

## 2020-04-17 ENCOUNTER — APPOINTMENT (OUTPATIENT)
Dept: LAB | Facility: CLINIC | Age: 65
End: 2020-04-17
Payer: COMMERCIAL

## 2020-04-17 ENCOUNTER — OFFICE VISIT (OUTPATIENT)
Dept: INTERNAL MEDICINE | Facility: CLINIC | Age: 65
End: 2020-04-17
Payer: COMMERCIAL

## 2020-04-17 VITALS
HEART RATE: 76 BPM | TEMPERATURE: 98.6 F | OXYGEN SATURATION: 100 % | DIASTOLIC BLOOD PRESSURE: 80 MMHG | SYSTOLIC BLOOD PRESSURE: 123 MMHG

## 2020-04-17 DIAGNOSIS — J02.0 STREPTOCOCCAL SORE THROAT: ICD-10-CM

## 2020-04-17 DIAGNOSIS — J02.0 STREPTOCOCCAL SORE THROAT: Primary | ICD-10-CM

## 2020-04-17 DIAGNOSIS — Z13.220 SCREENING CHOLESTEROL LEVEL: ICD-10-CM

## 2020-04-17 LAB
ALBUMIN SERPL-MCNC: 4.3 G/DL (ref 3.4–5)
ALBUMIN UR-MCNC: NEGATIVE MG/DL
ALP SERPL-CCNC: 86 U/L (ref 40–150)
ALT SERPL W P-5'-P-CCNC: 87 U/L (ref 0–70)
ANION GAP SERPL CALCULATED.3IONS-SCNC: 3 MMOL/L (ref 3–14)
APPEARANCE UR: CLEAR
AST SERPL W P-5'-P-CCNC: 41 U/L (ref 0–45)
BASOPHILS # BLD AUTO: 0.1 10E9/L (ref 0–0.2)
BASOPHILS NFR BLD AUTO: 0.7 %
BILIRUB SERPL-MCNC: 0.5 MG/DL (ref 0.2–1.3)
BILIRUB UR QL STRIP: NEGATIVE
BUN SERPL-MCNC: 19 MG/DL (ref 7–30)
CALCIUM SERPL-MCNC: 9.3 MG/DL (ref 8.5–10.1)
CHLORIDE SERPL-SCNC: 105 MMOL/L (ref 94–109)
CHOLEST SERPL-MCNC: 134 MG/DL
CO2 SERPL-SCNC: 31 MMOL/L (ref 20–32)
COLOR UR AUTO: YELLOW
CREAT SERPL-MCNC: 0.78 MG/DL (ref 0.66–1.25)
CRP SERPL-MCNC: <2.9 MG/L (ref 0–8)
DEPRECATED S PYO AG THROAT QL EIA: NEGATIVE
DIFFERENTIAL METHOD BLD: NORMAL
EOSINOPHIL # BLD AUTO: 0 10E9/L (ref 0–0.7)
EOSINOPHIL NFR BLD AUTO: 0.3 %
ERYTHROCYTE [DISTWIDTH] IN BLOOD BY AUTOMATED COUNT: 12.4 % (ref 10–15)
ERYTHROCYTE [SEDIMENTATION RATE] IN BLOOD BY WESTERGREN METHOD: 9 MM/H (ref 0–20)
GFR SERPL CREATININE-BSD FRML MDRD: >90 ML/MIN/{1.73_M2}
GLUCOSE SERPL-MCNC: 103 MG/DL (ref 70–99)
GLUCOSE UR STRIP-MCNC: NEGATIVE MG/DL
HCT VFR BLD AUTO: 45.7 % (ref 40–53)
HDLC SERPL-MCNC: 33 MG/DL
HGB BLD-MCNC: 15.5 G/DL (ref 13.3–17.7)
HGB UR QL STRIP: NEGATIVE
IMM GRANULOCYTES # BLD: 0 10E9/L (ref 0–0.4)
IMM GRANULOCYTES NFR BLD: 0.1 %
KETONES UR STRIP-MCNC: NEGATIVE MG/DL
LDLC SERPL CALC-MCNC: 67 MG/DL
LEUKOCYTE ESTERASE UR QL STRIP: NEGATIVE
LYMPHOCYTES # BLD AUTO: 1.7 10E9/L (ref 0.8–5.3)
LYMPHOCYTES NFR BLD AUTO: 25.2 %
MCH RBC QN AUTO: 31.7 PG (ref 26.5–33)
MCHC RBC AUTO-ENTMCNC: 33.9 G/DL (ref 31.5–36.5)
MCV RBC AUTO: 94 FL (ref 78–100)
MONOCYTES # BLD AUTO: 0.5 10E9/L (ref 0–1.3)
MONOCYTES NFR BLD AUTO: 7 %
NEUTROPHILS # BLD AUTO: 4.5 10E9/L (ref 1.6–8.3)
NEUTROPHILS NFR BLD AUTO: 66.7 %
NITRATE UR QL: NEGATIVE
NONHDLC SERPL-MCNC: 100 MG/DL
NRBC # BLD AUTO: 0 10*3/UL
NRBC BLD AUTO-RTO: 0 /100
PH UR STRIP: 8 PH (ref 5–7)
PLATELET # BLD AUTO: 211 10E9/L (ref 150–450)
POTASSIUM SERPL-SCNC: 4 MMOL/L (ref 3.4–5.3)
PROT SERPL-MCNC: 7.8 G/DL (ref 6.8–8.8)
RBC # BLD AUTO: 4.89 10E12/L (ref 4.4–5.9)
RBC #/AREA URNS AUTO: 1 /HPF (ref 0–2)
SODIUM SERPL-SCNC: 139 MMOL/L (ref 133–144)
SOURCE: ABNORMAL
SP GR UR STRIP: 1.01 (ref 1–1.03)
SPECIMEN SOURCE: NORMAL
TRIGL SERPL-MCNC: 169 MG/DL
UROBILINOGEN UR STRIP-MCNC: 0 MG/DL (ref 0–2)
WBC # BLD AUTO: 6.7 10E9/L (ref 4–11)
WBC #/AREA URNS AUTO: 1 /HPF (ref 0–5)

## 2020-04-17 ASSESSMENT — PAIN SCALES - GENERAL: PAINLEVEL: MODERATE PAIN (4)

## 2020-04-17 NOTE — PROGRESS NOTES
"HPI:    I had a telephone visit with Eric yesterday for a sore throat. He has about 6 weeks of sore throat associated with mild URI-like sxs. No Fever. No chills. No cough. He denies any SOB. He feels \"congested\" with some nasal drainage. No ear pain. As noted, he tried a course of Clindamycin that did not reduce his sxs. He denies significant dysphagia. Some voice change. He had reflux in the past. He states his \"ballintis\" is just about gone. No other  sxs. No dysuria, no hematuria. No change in joint sxs. No new medications. He does not smoke. No other HEENT, cardiopulmonary, abdominal, , neurological, systemic complaints.     Past Medical History:   Diagnosis Date     Basal cell carcinoma      Skin cancer      Past Surgical History:   Procedure Laterality Date     MOHS MICROGRAPHIC PROCEDURE       PE:    Vitals noted, gen nad cooperative alert, HEENT, ears clear, oropharynx with some erythema, and possible small L tonsillar ulcer? Neck supple nl rom, no adenopathy, no neck tenderness, Lung with good air movement, RRR, S1, S2, no MRG, abdomen, no acute findings, Grossly normal neurological exam    A/P:    Went through the differential, viral? Does not seem mycotic. Ordered HSV swab (could not be done). Called Eric to see if wants to come back for re-swab, just treat empirically with Valtrex or wait. He wants to wait for bacterial throat culture. Negative Strep testing. Other labs are normal except for slight increase in ALT. Will follow. Placed ENT referral as well to consider direct laryngoscopy.     ARRON Garland MD    Total time spent 25 minutes.  More than 50% of the time spent with Mr. Espinosa on counseling / coordinating his care        "

## 2020-04-17 NOTE — NURSING NOTE
Chief Complaint   Patient presents with     Pharyngitis     pt reports having sore throat for approx 6 weeks     Kimberly Nissen, EMT at 11:01 AM on 4/17/2020

## 2020-04-18 ENCOUNTER — TELEPHONE (OUTPATIENT)
Dept: INTERNAL MEDICINE | Facility: CLINIC | Age: 65
End: 2020-04-18

## 2020-04-18 DIAGNOSIS — R74.8 ELEVATED LIVER ENZYMES: Primary | ICD-10-CM

## 2020-04-18 NOTE — TELEPHONE ENCOUNTER
Placed future liver orders this encounter    ARRON Garland      Dear Eric;    Your laboratory tests look fine except for slight increase in one of the liver tests. I suggest we just repeat in a month or so and I placed a future laboratory order.    ARRON Garland

## 2020-04-19 LAB
BACTERIA SPEC CULT: NORMAL
C TRACH DNA SPEC QL NAA+PROBE: NEGATIVE
Lab: NORMAL
N GONORRHOEA DNA SPEC QL NAA+PROBE: NEGATIVE
SPECIMEN SOURCE: NORMAL

## 2020-04-20 ENCOUNTER — TELEPHONE (OUTPATIENT)
Dept: OTOLARYNGOLOGY | Facility: CLINIC | Age: 65
End: 2020-04-20

## 2020-04-20 ENCOUNTER — VIRTUAL VISIT (OUTPATIENT)
Dept: INTERNAL MEDICINE | Facility: CLINIC | Age: 65
End: 2020-04-20
Payer: COMMERCIAL

## 2020-04-20 DIAGNOSIS — J02.9 SORE THROAT: Primary | ICD-10-CM

## 2020-04-20 DIAGNOSIS — E78.00 HIGH BLOOD CHOLESTEROL: Primary | ICD-10-CM

## 2020-04-20 LAB
SPECIMEN SOURCE: NORMAL
STREP GROUP A PCR: NOT DETECTED

## 2020-04-20 NOTE — TELEPHONE ENCOUNTER
Spoke with patient regarding virtual visit with Dr. Cote on 4/23/20. Confirmed patient's email address. Also verified patient's appointment time, and informed him/her that we would be calling again 10-15 minutes prior to the appointment. Patient expressed understanding. Will send [employer] message with virtual visit information.    Patient email: [email or web address]  Appointment date: 4/23/20  Appointment time: 10:30 AM  [employer] active? YES

## 2020-04-20 NOTE — PROGRESS NOTES
I talked to Eric today regarding that we unable to get CG, Chlamydia, HSV oral swabs. Discussed other negative testing. He had slightly elevated liver tests and will decrease Atorvastatin from 20 mg to 10 mg PO every day or 20 every other day. Placed future lab order tests for repeat lipids an liver tests. He will come for throat swabbing tomorrow (4/21/2020).     ARRON Garland,     No charge today

## 2020-04-20 NOTE — TELEPHONE ENCOUNTER
FUTURE VISIT INFORMATION      FUTURE VISIT INFORMATION:    Date: 4/23/2020    Time: 10:30AM    Location: [facility]  REFERRAL INFORMATION:    Referring provider:  Dr Garland    Referring providers clinic:  [facility]    Reason for visit/diagnosis  Video visit- streptococcal sore throat- Referred by Dr. Garland     RECORDS REQUESTED FROM:       Clinic name Comments Records Status Imaging Status   [facility] 4/17/2020 notes with Dr Garlnad Epic

## 2020-04-21 ENCOUNTER — ALLIED HEALTH/NURSE VISIT (OUTPATIENT)
Dept: INTERNAL MEDICINE | Facility: CLINIC | Age: 65
End: 2020-04-21
Payer: COMMERCIAL

## 2020-04-21 DIAGNOSIS — Z11.3 SCREEN FOR STD (SEXUALLY TRANSMITTED DISEASE): Primary | ICD-10-CM

## 2020-04-21 NOTE — NURSING NOTE
Pt came in by the request of Dr. Garland to get swabbed and tested for STIs in the throat. Pt tolerated well. Dr. Garland was supervising provider and available should anything come up.    Kimberly Nissen, EMT at 11:23 AM on 4/21/2020

## 2020-04-22 LAB
C TRACH DNA SPEC QL NAA+PROBE: NEGATIVE
HSV1 DNA SPEC QL NAA+PROBE: NEGATIVE
HSV2 DNA SPEC QL NAA+PROBE: NEGATIVE
N GONORRHOEA DNA SPEC QL NAA+PROBE: NEGATIVE
SPECIMEN SOURCE: NORMAL

## 2020-04-23 ENCOUNTER — VIRTUAL VISIT (OUTPATIENT)
Dept: OTOLARYNGOLOGY | Facility: CLINIC | Age: 65
End: 2020-04-23
Attending: INTERNAL MEDICINE
Payer: COMMERCIAL

## 2020-04-23 ENCOUNTER — VIRTUAL VISIT (OUTPATIENT)
Dept: ORTHOPEDICS | Facility: CLINIC | Age: 65
End: 2020-04-23
Payer: COMMERCIAL

## 2020-04-23 ENCOUNTER — PRE VISIT (OUTPATIENT)
Dept: OTOLARYNGOLOGY | Facility: CLINIC | Age: 65
End: 2020-04-23

## 2020-04-23 VITALS — BODY MASS INDEX: 24.34 KG/M2 | WEIGHT: 170 LBS | HEIGHT: 70 IN

## 2020-04-23 DIAGNOSIS — M54.50 ACUTE MIDLINE LOW BACK PAIN WITHOUT SCIATICA: Primary | ICD-10-CM

## 2020-04-23 DIAGNOSIS — B37.0 THRUSH: Primary | ICD-10-CM

## 2020-04-23 RX ORDER — NYSTATIN 100000/ML
SUSPENSION, ORAL (FINAL DOSE FORM) ORAL
Qty: 600 ML | Refills: 0 | Status: SHIPPED | OUTPATIENT
Start: 2020-04-23 | End: 2020-07-07

## 2020-04-23 ASSESSMENT — MIFFLIN-ST. JEOR: SCORE: 1577.36

## 2020-04-23 ASSESSMENT — PAIN SCALES - GENERAL: PAINLEVEL: NO PAIN (0)

## 2020-04-23 NOTE — PATIENT INSTRUCTIONS
The patient presents with a history of chronic throat irritation associated with other symptoms including:  no fever, a feeling of malaise at times, a cold sore that resolved, a mild primarily non-productive cough, rare nausea with no diarrhea, no headache, dry irritated eyes, no change in sense of smell or taste, some chest discomfort at times but no shortness of breath, and abdominal discomfort consistent with previous events of diverticulitis after a course of Clindamycin. He will be treated with nystatin swish, gargle and spit solution for three weeks and he will follow up with a virtual visit in three weeks.

## 2020-04-23 NOTE — PROGRESS NOTES
"Eric Espinosa is a 64 year old male who is being evaluated via a billable video visit.      The patient has been notified of following:     \"This video visit will be conducted via a call between you and your physician/provider. We have found that certain health care needs can be provided without the need for an in-person physical exam.  This service lets us provide the care you need with a video conversation.  If a prescription is necessary we can send it directly to your pharmacy.  If lab work is needed we can place an order for that and you can then stop by our lab to have the test done at a later time.    Video visits are billed at different rates depending on your insurance coverage.  Please reach out to your insurance provider with any questions.    If during the course of the call the physician/provider feels a video visit is not appropriate, you will not be charged for this service.\"    Patient has given verbal consent for Video visit? Yes    How would you like to obtain your AVS? Abdias    Patient would like the video invitation sent by: Send to e-mail at: [email or web address]    Will anyone else be joining your video visit? No        Video-Visit Details    Type of service:  Video Visit    Video Start Time: 10:27 AM  Video End Time: 10:56 AM    Originating Location (pt. Location): Home    Distant Location (provider location):  [facility]     Mode of Communication:  Video Conference via [employer]    The patient presents with a history of chronic throat irritation for the past five weeks. He reports that the issues began in early March with a respiratory infection. He reports that his symptoms have been mild and diverse. They have included: no fever, a feeling of malaise at times, a cold sore that resolved, a mild primarily non-productive cough, rare nausea with no diarrhea, no headache, dry irritated eyes, no change in sense of smell or taste, some chest discomfort at times but no shortness " of breath, and abdominal discomfort consistent with previous events of diverticulitis after a course of Clindamycin. The patient denies otalgia, otorrhea, eustachian tube dysfunction, ear infections, dizziness or tinnitus. He reports a recent groin fungal infection that is being treated with topical medication.     This patient is seen in consultation at the request of Dr. Lanre Garland.    All other systems were reviewed and they are either negative or they are not directly pertinent to this Otolaryngology examination.      Past Medical History:    Past Medical History:   Diagnosis Date     Basal cell carcinoma      Skin cancer        Past Surgical History:    Past Surgical History:   Procedure Laterality Date     MOHS MICROGRAPHIC PROCEDURE         Medications:      Current Outpatient Medications:      ibuprofen (ADVIL,MOTRIN) 200 MG tablet, Take 200 mg by mouth every 4 hours as needed, Disp: , Rfl:      MELATONIN PO, Take 5 tablets by mouth At Bedtime, Disp: , Rfl:      zolpidem (AMBIEN) 5 MG tablet, Take 1 tablet (5 mg) by mouth nightly as needed for sleep, Disp: 30 tablet, Rfl: 1     ARTIFICIAL TEAR OP, Apply 1 drop to eye as needed, Disp: , Rfl:      atorvastatin (LIPITOR) 20 MG tablet, Take 1 tablet (20 mg) by mouth daily (Patient not taking: Reported on 4/23/2020), Disp: 90 tablet, Rfl: 3     fluocinonide (LIDEX) 0.05 % solution, Apply topically 2 times daily (Patient not taking: Reported on 11/1/2019), Disp: 60 mL, Rfl: 1     ramelteon (ROZEREM) 8 MG tablet, Take 1 tablet (8 mg) by mouth nightly as needed for sleep, Disp: 15 tablet, Rfl: 0     zaleplon (SONATA) 5 MG capsule, Take 1 capsule (5 mg) by mouth nightly as needed for sleep (Patient not taking: Reported on 11/1/2019), Disp: 15 capsule, Rfl: 0    Allergies:    Ciprofloxacin    Physical Examination:    The patient is a well developed, well nourished male in no apparent distress.  He is normocephalic, atraumatic.    Oral Cavity Examination:  Normal  mucosa with no masses or lesions.  The tonsils are 2+ and mildly cryptic (by photo sent by patient).  Neurological:  Facial nerve function intact and symmetric  Integumentary:  No lesion on the skin of the head and neck    Assessment and Plan:    The patient presents with a history of chronic throat irritation associated with other symptoms including:  no fever, a feeling of malaise at times, a cold sore that resolved, a mild primarily non-productive cough, rare nausea with no diarrhea, no headache, dry irritated eyes, no change in sense of smell or taste, some chest discomfort at times but no shortness of breath, and abdominal discomfort consistent with previous events of diverticulitis after a course of Clindamycin. He will be treated with nystatin swish, gargle and spit solution for three weeks and he will follow up with a virtual visit in three weeks.     CC: Dr. Lanre Cote MD

## 2020-04-23 NOTE — PROGRESS NOTES
"Eric Espinosa is a 64 year old male who is being evaluated via a billable video visit.      The patient has been notified of following:     \"This video visit will be conducted via a call between you and your physician/provider. We have found that certain health care needs can be provided without the need for an in-person physical exam.  This service lets us provide the care you need with a video conversation.  If a prescription is necessary we can send it directly to your pharmacy.  If lab work is needed we can place an order for that and you can then stop by our lab to have the test done at a later time.    Video visits are billed at different rates depending on your insurance coverage.  Please reach out to your insurance provider with any questions.    If during the course of the call the physician/provider feels a video visit is not appropriate, you will not be charged for this service.\"    Patient has given verbal consent for Video visit? Yes    How would you like to obtain your AVS? Leroyharhorace    Patient would like the video invitation sent by: Send to e-mail at:   [email or web address]      PMH:  Past Medical History:   Diagnosis Date     Basal cell carcinoma      Skin cancer        Active problem list:  Patient Active Problem List   Diagnosis     Psoriasis     Neoplasm of uncertain behavior of skin     AK (actinic keratosis)     BCC (basal cell carcinoma), trunk     Actinic keratosis     History of skin cancer     Diverticulitis     History of basal cell cancer     Dermal nevus     Dermatitis, seborrheic       FH:  Family History   Problem Relation Age of Onset     Cancer No family hx of         No family history of skin cancer     Skin Cancer No family hx of      Glaucoma No family hx of      Macular Degeneration No family hx of        SH:  Social History     Socioeconomic History     Marital status:      Spouse name: Not on file     Number of children: Not on file     Years of education: Not on " file     Highest education level: Not on file   Occupational History     Not on file   Social Needs     Financial resource strain: Not on file     Food insecurity     Worry: Not on file     Inability: Not on file     Transportation needs     Medical: Not on file     Non-medical: Not on file   Tobacco Use     Smoking status: Never Smoker     Smokeless tobacco: Never Used   Substance and Sexual Activity     Alcohol use: Yes     Drug use: No     Sexual activity: Yes     Partners: Female   Lifestyle     Physical activity     Days per week: Not on file     Minutes per session: Not on file     Stress: Not on file   Relationships     Social connections     Talks on phone: Not on file     Gets together: Not on file     Attends Advent service: Not on file     Active member of club or organization: Not on file     Attends meetings of clubs or organizations: Not on file     Relationship status: Not on file     Intimate partner violence     Fear of current or ex partner: Not on file     Emotionally abused: Not on file     Physically abused: Not on file     Forced sexual activity: Not on file   Other Topics Concern     Parent/sibling w/ CABG, MI or angioplasty before 65F 55M? 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       MEDS:  See EMR, reviewed  ALL:  See EMR, reviewed    REVIEW OF SYSTEMS:  CONSTITUTIONAL:NEGATIVE for fever, chills, change in weight  INTEGUMENTARY/SKIN: NEGATIVE for worrisome rashes, moles or lesions  EYES: NEGATIVE for vision changes or irritation  ENT/MOUTH: NEGATIVE for ear, mouth and throat problems  RESP:NEGATIVE for significant cough or SOB  BREAST: NEGATIVE for masses, tenderness or discharge  CV: NEGATIVE for chest pain, palpitations or peripheral edema  GI: NEGATIVE for nausea, abdominal pain, heartburn, or change in bowel habits  :NEGATIVE for frequency, dysuria, or hematuria  :NEGATIVE for frequency, dysuria, or hematuria  NEURO: NEGATIVE for weakness,  dizziness or paresthesias  ENDOCRINE: NEGATIVE for temperature intolerance, skin/hair changes  HEME/ALLERGY/IMMUNE: NEGATIVE for bleeding problems  PSYCHIATRIC: NEGATIVE for changes in mood or affect      Video start:  2:42  Video stop 2:59      S:  Centralized low back pain in lower lumbar spine since second week of January 2020.  Pt says pain has Improved over time.  Present only at low level now.  No radicular sx of numbness, leg pain, loss of bowel or bladder control.  No injury.  Similar problem 10 yrs ago, improved over course of a week.  Never had PT or injections.  Has used OTC back brace, which helps.  Has not needed pain meds.  Not waking from sleep with back pain.  H/o basal ceel ca, o/w healthy    O:  Points to central low back as area of pain.  Tolerates lumbar flexion and extension.  Normal leg strength.  Normal leg sensation.  Skin intact.  Straight leg neg.  Appropriate in conversation and affect.      A:  Lumbar djd/ddd    P;  We went over recent lumbar xrays in detail.   PT suggested, declined for now.  Pt would rather refer to youtube videos for exercises for low back.  Two appropriate videos suggested.  If not improving over upcoming 6-8 weeks advanced imaging could be considered when covid virus restrictions allow.

## 2020-05-13 ENCOUNTER — TELEPHONE (OUTPATIENT)
Dept: OTOLARYNGOLOGY | Facility: CLINIC | Age: 65
End: 2020-05-13

## 2020-05-13 NOTE — TELEPHONE ENCOUNTER
LVM regarding patient's appointment on 5/15/20 at 8:00 am. Requested a call back from patient to discuss set up for virtual visit. If patient returns call, please verify when they will be available to go through this process.  Ena William LPN

## 2020-05-14 ENCOUNTER — MYC MEDICAL ADVICE (OUTPATIENT)
Dept: OTOLARYNGOLOGY | Facility: CLINIC | Age: 65
End: 2020-05-14

## 2020-05-15 ENCOUNTER — VIRTUAL VISIT (OUTPATIENT)
Dept: OTOLARYNGOLOGY | Facility: CLINIC | Age: 65
End: 2020-05-15
Payer: COMMERCIAL

## 2020-05-15 VITALS — WEIGHT: 170 LBS | BODY MASS INDEX: 24.34 KG/M2 | HEIGHT: 70 IN

## 2020-05-15 DIAGNOSIS — B37.0 THRUSH: Primary | ICD-10-CM

## 2020-05-15 ASSESSMENT — MIFFLIN-ST. JEOR: SCORE: 1577.36

## 2020-05-15 ASSESSMENT — PAIN SCALES - GENERAL: PAINLEVEL: NO PAIN (0)

## 2020-05-15 NOTE — PROGRESS NOTES
"Eric Espinosa is a 64 year old male who is being evaluated via a billable video visit.      The patient has been notified of following:     \"This video visit will be conducted via a call between you and your physician/provider. We have found that certain health care needs can be provided without the need for an in-person physical exam.  This service lets us provide the care you need with a video conversation.  If a prescription is necessary we can send it directly to your pharmacy.  If lab work is needed we can place an order for that and you can then stop by our lab to have the test done at a later time.    Video visits are billed at different rates depending on your insurance coverage.  Please reach out to your insurance provider with any questions.    If during the course of the call the physician/provider feels a video visit is not appropriate, you will not be charged for this service.\"    Patient has given verbal consent for Video visit? Yes    How would you like to obtain your AVS? Abdias    Patient would like the video invitation sent by: Send to e-mail at: [email or web address]    Will anyone else be joining your video visit? No        Video-Visit Details    Type of service:  Video Visit    Video Start Time: 8:10 AM  Video End Time: 8:37 AM    Originating Location (pt. Location): Home    Distant Location (provider location):  [facility]     Platform used for Video Visit: [facility]     The patient presents with a history of chronic throat irritation associated with other symptoms including:  no fever, a feeling of malaise at times, a cold sore that resolved, a mild primarily non-productive cough, rare nausea with no diarrhea, no headache, dry irritated eyes, no change in sense of smell or taste, some chest discomfort at times but no shortness of breath, and abdominal discomfort consistent with previous events of diverticulitis after a course of Clindamycin. He was treated with nystatin swish, " gargle and spit solution for three weeks. He reports some improvement of his condition, but not complete resolution. We discussed at some length possible cause of these symptoms.       All other systems were reviewed and they are either negative or they are not directly pertinent to this Otolaryngology examination.      Past Medical History:    Past Medical History:   Diagnosis Date     Basal cell carcinoma      Skin cancer        Past Surgical History:    Past Surgical History:   Procedure Laterality Date     MOHS MICROGRAPHIC PROCEDURE         Medications:      Current Outpatient Medications:      ARTIFICIAL TEAR OP, Apply 1 drop to eye as needed, Disp: , Rfl:      atorvastatin (LIPITOR) 20 MG tablet, Take 1 tablet (20 mg) by mouth daily (Patient not taking: Reported on 4/23/2020), Disp: 90 tablet, Rfl: 3     fluocinonide (LIDEX) 0.05 % solution, Apply topically 2 times daily (Patient not taking: Reported on 11/1/2019), Disp: 60 mL, Rfl: 1     ibuprofen (ADVIL,MOTRIN) 200 MG tablet, Take 200 mg by mouth every 4 hours as needed, Disp: , Rfl:      MELATONIN PO, Take 5 tablets by mouth At Bedtime, Disp: , Rfl:      ramelteon (ROZEREM) 8 MG tablet, Take 1 tablet (8 mg) by mouth nightly as needed for sleep, Disp: 15 tablet, Rfl: 0     zaleplon (SONATA) 5 MG capsule, Take 1 capsule (5 mg) by mouth nightly as needed for sleep (Patient not taking: Reported on 11/1/2019), Disp: 15 capsule, Rfl: 0     zolpidem (AMBIEN) 5 MG tablet, Take 1 tablet (5 mg) by mouth nightly as needed for sleep, Disp: 30 tablet, Rfl: 1    Allergies:    Ciprofloxacin    Physical Examination:    The patient is a well developed, well nourished male in no apparent distress.  He is normocepahlic, atraumatic.    Neurological Examination: Facial nerve function intact and symmetric  Integumentary Examination: No lesions on the skin of the head or neck      Assessment and Plan:    The patient presents with a history of chronic throat irritation associated  with other symptoms including:  no fever, a feeling of malaise at times, a cold sore that resolved, a mild primarily non-productive cough, rare nausea with no diarrhea, no headache, dry irritated eyes, no change in sense of smell or taste, some chest discomfort at times but no shortness of breath, and abdominal discomfort consistent with previous events of diverticulitis after a course of Clindamycin. He was treated with nystatin swish, gargle and spit solution for three weeks. He has improved with regard to his symptoms, but the condition has not completely resolved. He will stop the use of nystatin for two weeks and he will be seen again in three weeks to assess his progress.     CC: Dr. Lanre Cote MD

## 2020-05-15 NOTE — PATIENT INSTRUCTIONS
The patient presents with a history of chronic throat irritation associated with other symptoms including:  no fever, a feeling of malaise at times, a cold sore that resolved, a mild primarily non-productive cough, rare nausea with no diarrhea, no headache, dry irritated eyes, no change in sense of smell or taste, some chest discomfort at times but no shortness of breath, and abdominal discomfort consistent with previous events of diverticulitis after a course of Clindamycin. He was treated with nystatin swish, gargle and spit solution for three weeks. He has improved with regard to his symptoms, but the condition has not completely resolved. He will stop the use of nystatin for two weeks and he will be seen again in three weeks to assess his progress.

## 2020-06-04 ENCOUNTER — VIRTUAL VISIT (OUTPATIENT)
Dept: OTOLARYNGOLOGY | Facility: CLINIC | Age: 65
End: 2020-06-04
Payer: COMMERCIAL

## 2020-06-04 ENCOUNTER — TELEPHONE (OUTPATIENT)
Dept: OTOLARYNGOLOGY | Facility: CLINIC | Age: 65
End: 2020-06-04

## 2020-06-04 VITALS — WEIGHT: 170 LBS | HEIGHT: 70 IN | BODY MASS INDEX: 24.34 KG/M2

## 2020-06-04 DIAGNOSIS — K13.70 ORAL MUCOSAL LESION: Primary | ICD-10-CM

## 2020-06-04 DIAGNOSIS — B37.0 THRUSH: ICD-10-CM

## 2020-06-04 ASSESSMENT — MIFFLIN-ST. JEOR: SCORE: 1577.36

## 2020-06-04 ASSESSMENT — PAIN SCALES - GENERAL: PAINLEVEL: NO PAIN (1)

## 2020-06-04 NOTE — LETTER
"6/4/2020       RE: Eric Espinosa  [address]   [place] [address]     Dear Colleague,    Thank you for referring your patient, Eric Espinosa, to the [facility] EAR NOSE AND THROAT at [facility]. Please see a copy of my visit note below.    Eric Espinosa is a 64 year old male who is being evaluated via a billable video visit.      The patient has been notified of following:     \"This video visit will be conducted via a call between you and your physician/provider. We have found that certain health care needs can be provided without the need for an in-person physical exam.  This service lets us provide the care you need with a video conversation.  If a prescription is necessary we can send it directly to your pharmacy.  If lab work is needed we can place an order for that and you can then stop by our lab to have the test done at a later time.    Video visits are billed at different rates depending on your insurance coverage.  Please reach out to your insurance provider with any questions.    If during the course of the call the physician/provider feels a video visit is not appropriate, you will not be charged for this service.\"    Patient has given verbal consent for Video visit? Yes    How would you like to obtain your AVS? [place]    Patient would like the video invitation sent by: Send to e-mail at: [email or web address]   [phone]    Will anyone else be joining your video visit? No        Video-Visit Details    Type of service:  Video Visit    Video Start Time: 3:11 PM  Video End Time: 3:40 PM    Originating Location (pt. Location): Home    Distant Location (provider location):  [facility] EAR NOSE AND THROAT     Platform used for Video Visit: [facility]     The patient presents with a history of chronic throat irritation associated with other symptoms including:  no fever, a feeling of malaise at times, a cold sore that resolved, a mild primarily non-productive cough, rare " nausea with no diarrhea, no headache, dry irritated eyes, no change in sense of smell or taste, some chest discomfort at times but no shortness of breath, and abdominal discomfort consistent with previous events of diverticulitis after a course of Clindamycin. He was treated with nystatin swish, gargle and spit solution for three weeks. He reports some improvement of his condition, but not complete resolution. Despite time and medical therapy, the patient continues to have difficulty with the altered oral mucosa. We discussed at some length possible cause of these symptoms.         All other systems were reviewed and they are either negative or they are not directly pertinent to this Otolaryngology examination.       Past Medical History:     Past Medical History        Past Medical History:   Diagnosis Date     Basal cell carcinoma       Skin cancer             Past Surgical History:     Past Surgical History         Past Surgical History:   Procedure Laterality Date     MOHS MICROGRAPHIC PROCEDURE               Medications:       Current Outpatient Medications:      ARTIFICIAL TEAR OP, Apply 1 drop to eye as needed, Disp: , Rfl:      atorvastatin (LIPITOR) 20 MG tablet, Take 1 tablet (20 mg) by mouth daily (Patient not taking: Reported on 4/23/2020), Disp: 90 tablet, Rfl: 3     fluocinonide (LIDEX) 0.05 % solution, Apply topically 2 times daily (Patient not taking: Reported on 11/1/2019), Disp: 60 mL, Rfl: 1     ibuprofen (ADVIL,MOTRIN) 200 MG tablet, Take 200 mg by mouth every 4 hours as needed, Disp: , Rfl:      MELATONIN PO, Take 5 tablets by mouth At Bedtime, Disp: , Rfl:      ramelteon (ROZEREM) 8 MG tablet, Take 1 tablet (8 mg) by mouth nightly as needed for sleep, Disp: 15 tablet, Rfl: 0     zaleplon (SONATA) 5 MG capsule, Take 1 capsule (5 mg) by mouth nightly as needed for sleep (Patient not taking: Reported on 11/1/2019), Disp: 15 capsule, Rfl: 0     zolpidem (AMBIEN) 5 MG tablet, Take 1 tablet (5 mg) by  mouth nightly as needed for sleep, Disp: 30 tablet, Rfl: 1     Allergies:     Ciprofloxacin     Physical Examination:     The patient is a well developed, well nourished male in no apparent distress.  He is normocepahlic, atraumatic.     Neurological Examination: Facial nerve function intact and symmetric  Integumentary Examination: No lesions on the skin of the head or neck        Assessment and Plan:     The patient presents with a history of chronic throat irritation associated with other symptoms including:  no fever, a feeling of malaise at times, a cold sore that resolved, a mild primarily non-productive cough, rare nausea with no diarrhea, no headache, dry irritated eyes, no change in sense of smell or taste, some chest discomfort at times but no shortness of breath, and abdominal discomfort consistent with previous events of diverticulitis after a course of Clindamycin. He was treated with nystatin swish, gargle and spit solution for three weeks. He has improved with regard to his symptoms, but the condition has not completely resolved. He will be referred to Dr. Raghavendra Sandoval for possible biopsy and further evaluation of his oral condition.       CC: Dr. Lanre Cote MD          Again, thank you for allowing me to participate in the care of your patient.      Sincerely,    Fitz Cote MD

## 2020-06-04 NOTE — PROGRESS NOTES
"Eric Espinosa is a 64 year old male who is being evaluated via a billable video visit.      The patient has been notified of following:     \"This video visit will be conducted via a call between you and your physician/provider. We have found that certain health care needs can be provided without the need for an in-person physical exam.  This service lets us provide the care you need with a video conversation.  If a prescription is necessary we can send it directly to your pharmacy.  If lab work is needed we can place an order for that and you can then stop by our lab to have the test done at a later time.    Video visits are billed at different rates depending on your insurance coverage.  Please reach out to your insurance provider with any questions.    If during the course of the call the physician/provider feels a video visit is not appropriate, you will not be charged for this service.\"    Patient has given verbal consent for Video visit? Yes    How would you like to obtain your AVS? Abdias    Patient would like the video invitation sent by: Send to e-mail at: [email or web address]   [phone]    Will anyone else be joining your video visit? No        Video-Visit Details    Type of service:  Video Visit    Video Start Time: 3:11 PM  Video End Time: 3:40 PM    Originating Location (pt. Location): Home    Distant Location (provider location):  [facility] EAR NOSE AND THROAT     Platform used for Video Visit: [employer]     The patient presents with a history of chronic throat irritation associated with other symptoms including:  no fever, a feeling of malaise at times, a cold sore that resolved, a mild primarily non-productive cough, rare nausea with no diarrhea, no headache, dry irritated eyes, no change in sense of smell or taste, some chest discomfort at times but no shortness of breath, and abdominal discomfort consistent with previous events of diverticulitis after a course of Clindamycin. He was treated with " nystatin swish, gargle and spit solution for three weeks. He reports some improvement of his condition, but not complete resolution. Despite time and medical therapy, the patient continues to have difficulty with the altered oral mucosa. We discussed at some length possible cause of these symptoms.         All other systems were reviewed and they are either negative or they are not directly pertinent to this Otolaryngology examination.       Past Medical History:     Past Medical History        Past Medical History:   Diagnosis Date     Basal cell carcinoma       Skin cancer             Past Surgical History:     Past Surgical History         Past Surgical History:   Procedure Laterality Date     MOHS MICROGRAPHIC PROCEDURE               Medications:       Current Outpatient Medications:      ARTIFICIAL TEAR OP, Apply 1 drop to eye as needed, Disp: , Rfl:      atorvastatin (LIPITOR) 20 MG tablet, Take 1 tablet (20 mg) by mouth daily (Patient not taking: Reported on 4/23/2020), Disp: 90 tablet, Rfl: 3     fluocinonide (LIDEX) 0.05 % solution, Apply topically 2 times daily (Patient not taking: Reported on 11/1/2019), Disp: 60 mL, Rfl: 1     ibuprofen (ADVIL,MOTRIN) 200 MG tablet, Take 200 mg by mouth every 4 hours as needed, Disp: , Rfl:      MELATONIN PO, Take 5 tablets by mouth At Bedtime, Disp: , Rfl:      ramelteon (ROZEREM) 8 MG tablet, Take 1 tablet (8 mg) by mouth nightly as needed for sleep, Disp: 15 tablet, Rfl: 0     zaleplon (SONATA) 5 MG capsule, Take 1 capsule (5 mg) by mouth nightly as needed for sleep (Patient not taking: Reported on 11/1/2019), Disp: 15 capsule, Rfl: 0     zolpidem (AMBIEN) 5 MG tablet, Take 1 tablet (5 mg) by mouth nightly as needed for sleep, Disp: 30 tablet, Rfl: 1     Allergies:     Ciprofloxacin     Physical Examination:     The patient is a well developed, well nourished male in no apparent distress.  He is normocepahlic, atraumatic.     Neurological Examination: Facial nerve  function intact and symmetric  Integumentary Examination: No lesions on the skin of the head or neck        Assessment and Plan:     The patient presents with a history of chronic throat irritation associated with other symptoms including:  no fever, a feeling of malaise at times, a cold sore that resolved, a mild primarily non-productive cough, rare nausea with no diarrhea, no headache, dry irritated eyes, no change in sense of smell or taste, some chest discomfort at times but no shortness of breath, and abdominal discomfort consistent with previous events of diverticulitis after a course of Clindamycin. He was treated with nystatin swish, gargle and spit solution for three weeks. He has improved with regard to his symptoms, but the condition has not completely resolved. He will be referred to Dr. Raghavendra Sandoval for possible biopsy and further evaluation of his oral condition.       CC: Dr. Lanre Cote MD

## 2020-06-04 NOTE — TELEPHONE ENCOUNTER
Spoke with patient regarding Dr. Sandoval referral from Dr. Cote. Writer scheduled him for 7/7/2020 at 1430 for in person visit. Patient acknowledged time and date.    Irene Davenport LPN

## 2020-06-04 NOTE — PATIENT INSTRUCTIONS
The patient presents with a history of chronic throat irritation associated with other symptoms including:  no fever, a feeling of malaise at times, a cold sore that resolved, a mild primarily non-productive cough, rare nausea with no diarrhea, no headache, dry irritated eyes, no change in sense of smell or taste, some chest discomfort at times but no shortness of breath, and abdominal discomfort consistent with previous events of diverticulitis after a course of Clindamycin. He was treated with nystatin swish, gargle and spit solution for three weeks. He has improved with regard to his symptoms, but the condition has not completely resolved. He will be referred to Dr. Raghavendra Sandoval for possible biopsy and further evaluation of his oral condition.

## 2020-06-05 NOTE — TELEPHONE ENCOUNTER
FUTURE VISIT INFORMATION      FUTURE VISIT INFORMATION:    Date: 7/7/2020    Time: 2:30PM    Location[place] [facility]  REFERRAL INFORMATION:    Referring provider:  Dr Cote     Referring providers clinic:  [facility]    Reason for visit/diagnosis Possible oral biopsy (patterson referral)    RECORDS REQUESTED FROM:       Clinic name Comments Records Status Imaging Status   [facility] 6/4/2020 note from Dr Kera CABALLERO    [facility] 4/17/2020 notes with Dr Dada Caballero

## 2020-07-07 ENCOUNTER — OFFICE VISIT (OUTPATIENT)
Dept: OTOLARYNGOLOGY | Facility: CLINIC | Age: 65
End: 2020-07-07
Payer: COMMERCIAL

## 2020-07-07 ENCOUNTER — TELEPHONE (OUTPATIENT)
Dept: INTERNAL MEDICINE | Facility: CLINIC | Age: 65
End: 2020-07-07

## 2020-07-07 ENCOUNTER — PRE VISIT (OUTPATIENT)
Dept: OTOLARYNGOLOGY | Facility: CLINIC | Age: 65
End: 2020-07-07

## 2020-07-07 VITALS
WEIGHT: 168 LBS | DIASTOLIC BLOOD PRESSURE: 84 MMHG | HEART RATE: 81 BPM | OXYGEN SATURATION: 97 % | TEMPERATURE: 98.1 F | SYSTOLIC BLOOD PRESSURE: 124 MMHG | HEIGHT: 70 IN | BODY MASS INDEX: 24.05 KG/M2

## 2020-07-07 DIAGNOSIS — R82.90 ABNORMAL URINE: Primary | ICD-10-CM

## 2020-07-07 DIAGNOSIS — K13.70 ORAL MUCOSAL LESION: Primary | ICD-10-CM

## 2020-07-07 DIAGNOSIS — R74.8 ELEVATED LIVER ENZYMES: ICD-10-CM

## 2020-07-07 DIAGNOSIS — E78.00 HIGH BLOOD CHOLESTEROL: ICD-10-CM

## 2020-07-07 DIAGNOSIS — R39.89 POSSIBLE URINARY TRACT INFECTION: ICD-10-CM

## 2020-07-07 LAB
ALBUMIN UR-MCNC: NEGATIVE MG/DL
AMORPH CRY #/AREA URNS HPF: ABNORMAL /HPF
APPEARANCE UR: ABNORMAL
BACTERIA #/AREA URNS HPF: ABNORMAL /HPF
BILIRUB UR QL STRIP: NEGATIVE
COLOR UR AUTO: YELLOW
GLUCOSE UR STRIP-MCNC: NEGATIVE MG/DL
HGB UR QL STRIP: NEGATIVE
KETONES UR STRIP-MCNC: NEGATIVE MG/DL
LEUKOCYTE ESTERASE UR QL STRIP: ABNORMAL
MUCOUS THREADS #/AREA URNS LPF: PRESENT /LPF
NITRATE UR QL: NEGATIVE
PH UR STRIP: 9 PH (ref 5–7)
RBC #/AREA URNS AUTO: 6 /HPF (ref 0–2)
SOURCE: ABNORMAL
SP GR UR STRIP: 1.01 (ref 1–1.03)
UROBILINOGEN UR STRIP-MCNC: 0 MG/DL (ref 0–2)
WBC #/AREA URNS AUTO: 18 /HPF (ref 0–5)

## 2020-07-07 RX ORDER — SULFAMETHOXAZOLE/TRIMETHOPRIM 800-160 MG
1 TABLET ORAL 2 TIMES DAILY
Qty: 10 TABLET | Refills: 0 | Status: SHIPPED | OUTPATIENT
Start: 2020-07-07 | End: 2020-10-09

## 2020-07-07 ASSESSMENT — PAIN SCALES - GENERAL: PAINLEVEL: NO PAIN (1)

## 2020-07-07 ASSESSMENT — MIFFLIN-ST. JEOR: SCORE: 1568.29

## 2020-07-07 NOTE — LETTER
7/7/2020       RE: Eric Espinosa  [address]   [facility] [address]     Dear Colleague,    Thank you for referring your patient, Eric Espinosa, to the [facility] EAR NOSE AND THROAT at [facility]. Please see a copy of my visit note below.    HISTORY OF PRESENT ILLNESS: Eric Espinosa is a 64 year old male with a history ofHISTORY OF PRESENT ILLNESS:  Eric Espinosa is a gentleman who is 64 years of age, referred from Rc Cote MD and referred from Lanre Garland MD for chronic sore throat that has been going on since March.  He does not really have globus that much, but this may affect his voice a little bit.  He has some kind of an esophageal spasm episodes that have been going on for 40 years, but he does not endorse a large amount of reflux complaints.  He is a nonsmoker and a nondrinker.  He does use a fair amount of high alcohol containing mouthwashes presently, almost daily, and the sore throat that has been going on since that has been occurring.  He has no other sinus complaints.  He does not endorse any seasonal allergic rhinitis or anything else of this nature.       Last 2 Scores for Patient-Answered VHI Questionnaire  No flowsheet data found.    Last 2 Scores for Patient-Answered CSI Questionnaire  No flowsheet data found.      Last 2 Scores for Patient-Answered EAT Questionnaire  No flowsheet data found.        PAST MEDICAL HISTORY:   Past Medical History:   Diagnosis Date     Arthritis      Autoimmune disease (H)      Basal cell carcinoma      Chronic tonsillitis      Hoarseness      Skin cancer        PAST SURGICAL HISTORY:   Past Surgical History:   Procedure Laterality Date     ENT SURGERY  1993    fix deviated septum, scrape turbinates     MOHS MICROGRAPHIC PROCEDURE         FAMILY HISTORY:   Family History   Problem Relation Age of Onset     Diabetes Father      Heart Disease Father      Hypertension Mother      Cancer No family hx of         No family  history of skin cancer     Skin Cancer No family hx of      Glaucoma No family hx of      Macular Degeneration No family hx of        SOCIAL HISTORY:   Social History     Tobacco Use     Smoking status: Never Smoker     Smokeless tobacco: Never Used   Substance Use Topics     Alcohol use: Yes       REVIEW OF SYSTEMS: Ten point review of systems was performed and is negative except for:   [facility] E[facility] ROS 6/23/2020   Ears, Nose, Throat Sore throat, Trouble swallowing   Cardiopulmonary Cough   Gastrointestinal/Genitourinary Pain with urination   Musculoskeletal Back pain, Swollen legs/feet   Allergy/Immunology Rash   Other Rash        ALLERGIES: Ciprofloxacin    MEDICATIONS:   Current Outpatient Medications   Medication Sig Dispense Refill     ARTIFICIAL TEAR OP Apply 1 drop to eye as needed       atorvastatin (LIPITOR) 20 MG tablet Take 1 tablet (20 mg) by mouth daily 90 tablet 3     fluocinonide (LIDEX) 0.05 % solution Apply topically 2 times daily 60 mL 1     ibuprofen (ADVIL,MOTRIN) 200 MG tablet Take 200 mg by mouth every 4 hours as needed       MELATONIN PO Take 5 mg by mouth At Bedtime        zaleplon (SONATA) 5 MG capsule Take 1 capsule (5 mg) by mouth nightly as needed for sleep 15 capsule 0     zolpidem (AMBIEN) 5 MG tablet Take 1 tablet (5 mg) by mouth nightly as needed for sleep 30 tablet 1         PHYSICAL EXAMINATION:  He  is awake, alert and in no apparent distress.    His tympanic membranes are clear and intact bilaterally. External auditory canals are clear.  Nasal exam shows a mild septal deviation without obstruction.  Examination of the oral cavity shows no suspicious lesions.  There is symmetric movement of the tongue and soft palate.    The oropharynx is clear.  His neck is supple without significant adenopathy.  Pulse is regular.  Upper airway is clear.  Cranial nerves II-XII are grossly intact.                  ASSESSMENT:  Patient with some generalized pharyngeal erythema.  There are no other  significant masses or lesions are present on his tonsils or on indirect laryngoscopy or anything else of this nature.      PLAN:  We are going to go ahead and have a step-wise approach to this.  The first thing that we would like him to do is have him stop the high alcohol containing mouthwash and just go to a salt and soda wash that is a lot easier on him.   We want to do a video visit on him in a couple of weeks.  At that point in time if he is not improving, we could think about doing Mycelex troches for a couple weeks full dose and then at the end of that if that is not good, we would probably go ahead and think about getting a  pH probe study or something like that on him.  He understands and agrees with this.  We did not present the pH probe study to him on this visit today.         Again, thank you for allowing me to participate in the care of your patient.      Sincerely,    Raghavendra Sandoval MD

## 2020-07-07 NOTE — PROGRESS NOTES
HISTORY OF PRESENT ILLNESS: Eric Espinosa is a 64 year old male with a history ofHISTORY OF PRESENT ILLNESS:  Eric Espinosa is a gentleman who is 64 years of age, referred from Rc Cote MD and referred from Lanre Garland MD for chronic sore throat that has been going on since March.  He does not really have globus that much, but this may affect his voice a little bit.  He has some kind of an esophageal spasm episodes that have been going on for 40 years, but he does not endorse a large amount of reflux complaints.  He is a nonsmoker and a nondrinker.  He does use a fair amount of high alcohol containing mouthwashes presently, almost daily, and the sore throat that has been going on since that has been occurring.  He has no other sinus complaints.  He does not endorse any seasonal allergic rhinitis or anything else of this nature.       Last 2 Scores for Patient-Answered VHI Questionnaire  No flowsheet data found.    Last 2 Scores for Patient-Answered CSI Questionnaire  No flowsheet data found.      Last 2 Scores for Patient-Answered EAT Questionnaire  No flowsheet data found.        PAST MEDICAL HISTORY:   Past Medical History:   Diagnosis Date     Arthritis      Autoimmune disease (H)      Basal cell carcinoma      Chronic tonsillitis      Hoarseness      Skin cancer        PAST SURGICAL HISTORY:   Past Surgical History:   Procedure Laterality Date     ENT SURGERY  1993    fix deviated septum, scrape turbinates     MOHS MICROGRAPHIC PROCEDURE         FAMILY HISTORY:   Family History   Problem Relation Age of Onset     Diabetes Father      Heart Disease Father      Hypertension Mother      Cancer No family hx of         No family history of skin cancer     Skin Cancer No family hx of      Glaucoma No family hx of      Macular Degeneration No family hx of        SOCIAL HISTORY:   Social History     Tobacco Use     Smoking status: Never Smoker     Smokeless tobacco: Never Used   Substance Use Topics      Alcohol use: Yes       REVIEW OF SYSTEMS: Ten point review of systems was performed and is negative except for:   [facility] E[facility] ROS 6/23/2020   Ears, Nose, Throat Sore throat, Trouble swallowing   Cardiopulmonary Cough   Gastrointestinal/Genitourinary Pain with urination   Musculoskeletal Back pain, Swollen legs/feet   Allergy/Immunology Rash   Other Rash        ALLERGIES: Ciprofloxacin    MEDICATIONS:   Current Outpatient Medications   Medication Sig Dispense Refill     ARTIFICIAL TEAR OP Apply 1 drop to eye as needed       atorvastatin (LIPITOR) 20 MG tablet Take 1 tablet (20 mg) by mouth daily 90 tablet 3     fluocinonide (LIDEX) 0.05 % solution Apply topically 2 times daily 60 mL 1     ibuprofen (ADVIL,MOTRIN) 200 MG tablet Take 200 mg by mouth every 4 hours as needed       MELATONIN PO Take 5 mg by mouth At Bedtime        zaleplon (SONATA) 5 MG capsule Take 1 capsule (5 mg) by mouth nightly as needed for sleep 15 capsule 0     zolpidem (AMBIEN) 5 MG tablet Take 1 tablet (5 mg) by mouth nightly as needed for sleep 30 tablet 1         PHYSICAL EXAMINATION:  He  is awake, alert and in no apparent distress.    His tympanic membranes are clear and intact bilaterally. External auditory canals are clear.  Nasal exam shows a mild septal deviation without obstruction.  Examination of the oral cavity shows no suspicious lesions.  There is symmetric movement of the tongue and soft palate.    The oropharynx is clear.  His neck is supple without significant adenopathy.  Pulse is regular.  Upper airway is clear.  Cranial nerves II-XII are grossly intact.                  ASSESSMENT:  Patient with some generalized pharyngeal erythema.  There are no other significant masses or lesions are present on his tonsils or on indirect laryngoscopy or anything else of this nature.      PLAN:  We are going to go ahead and have a step-wise approach to this.  The first thing that we would like him to do is have him stop the high alcohol  containing mouthwash and just go to a salt and soda wash that is a lot easier on him.   We want to do a video visit on him in a couple of weeks.  At that point in time if he is not improving, we could think about doing Mycelex troches for a couple weeks full dose and then at the end of that if that is not good, we would probably go ahead and think about getting a  pH probe study or something like that on him.  He understands and agrees with this.  We did not present the pH probe study to him on this visit today.

## 2020-07-07 NOTE — TELEPHONE ENCOUNTER
Called Elroy and he has UTI sxs. Urgency, burning and had a fever. UA is positive and UC is pending. He is allergic to Cipro. Sent in Rx. For Bactrim and will follow up.    ARRON Garland

## 2020-07-07 NOTE — PATIENT INSTRUCTIONS
1. You were seen in the ENT Clinic today by Dr. Sandoval.  If you have any questions or concerns after your appointment, please call   -  ENT Clinic: [phone]      Irene Davenport LPN  [facility] Otolaryngology  [phone]

## 2020-07-07 NOTE — NURSING NOTE
"Chief Complaint   Patient presents with     Consult     possible oral biopsy     Blood pressure 124/84, pulse 81, temperature 98.1  F (36.7  C), height 1.778 m (5' 10\"), weight 76.2 kg (168 lb), SpO2 97 %.    Derek Arias LPN    "

## 2020-07-08 LAB
BACTERIA SPEC CULT: ABNORMAL
Lab: ABNORMAL
SPECIMEN SOURCE: ABNORMAL

## 2020-07-10 ENCOUNTER — TELEPHONE (OUTPATIENT)
Dept: INTERNAL MEDICINE | Facility: CLINIC | Age: 65
End: 2020-07-10

## 2020-07-10 DIAGNOSIS — R82.90 ABNORMAL URINE: Primary | ICD-10-CM

## 2020-07-10 NOTE — TELEPHONE ENCOUNTER
Placed urinalysis order this encounter    Dear Eric;    Your final urine culture are sensitive to the Bactrim antibiotics. I do recommend that we recheck your urinalysis in about 3 weeks. I placed a future urine order and you can call [phone] to schedule the laboratory appointment.    ARRON Garland MD

## 2020-07-28 ENCOUNTER — TELEPHONE (OUTPATIENT)
Dept: UROLOGY | Facility: CLINIC | Age: 65
End: 2020-07-28

## 2020-07-28 NOTE — TELEPHONE ENCOUNTER
Left message to schedule referral to Urology from [facility]. Left clinic number for patient.

## 2020-07-30 NOTE — TELEPHONE ENCOUNTER
MEDICAL RECORDS REQUEST   [facility]  Urology Clinic  [address] [place] [address]  PHONE: [phone]  Fax: [phone]        FUTURE VISIT INFORMATION                                                   SANJIV Sifuentes: 1955 scheduled for future visit at [facility] Urology Clinic    APPOINTMENT INFORMATION:    Date: 10/5/20 10:30AM    Provider:  Dajuan Stafford MD    Reason for Visit/Diagnosis: BPH, UTI    REFERRAL INFORMATION:    Referring provider:  Lanre Garland    Specialty: N/A    Referring providers clinic:  N/A    Clinic contact number:  N/A    RECORDS REQUESTED FOR VISIT                                                     NOTES  STATUS/DETAILS   OFFICE NOTE from referring provider  yes   OFFICE NOTE from other specialist  no   DISCHARGE SUMMARY from hospital  no   DISCHARGE REPORT from the ER  no   OPERATIVE REPORT  no   MEDICATION LIST  yes   LABS     URINALYSIS (UA)  yes     PRE-VISIT CHECKLIST      Record collection complete Yes- Internal recs in epic    Appointment appropriately scheduled           (right time/right provider) Yes   MyChart activation Yes   Questionnaire complete If no, please explain: In process      Completed by: Grisel Watkins

## 2020-08-11 ENCOUNTER — VIRTUAL VISIT (OUTPATIENT)
Dept: OTOLARYNGOLOGY | Facility: CLINIC | Age: 65
End: 2020-08-11
Payer: COMMERCIAL

## 2020-08-11 DIAGNOSIS — K13.70 ORAL MUCOSAL LESION: Primary | ICD-10-CM

## 2020-08-11 RX ORDER — CLOTRIMAZOLE 10 MG/1
LOZENGE ORAL
Qty: 70 LOZENGE | Refills: 0 | Status: SHIPPED | OUTPATIENT
Start: 2020-08-11 | End: 2020-08-25

## 2020-08-11 NOTE — PROGRESS NOTES
"Eric Espinosa is a 65 year old male who is being evaluated via a billable telephone visit.      The patient has been notified of following:     \"This telephone visit will be conducted via a call between you and your physician/provider. We have found that certain health care needs can be provided without the need for a physical exam.  This service lets us provide the care you need with a short phone conversation.  If a prescription is necessary we can send it directly to your pharmacy.  If lab work is needed we can place an order for that and you can then stop by our lab to have the test done at a later time.    Telephone visits are billed at different rates depending on your insurance coverage. During this emergency period, for some insurers they may be billed the same as an in-person visit.  Please reach out to your insurance provider with any questions.    If during the course of the call the physician/provider feels a telephone visit is not appropriate, you will not be charged for this service.\"    Patient has given verbal consent for Telephone visit?  Yes    What phone number would you like to be contacted at? [phone]    How would you like to obtain your AVS? Leroyhart    Phone call duration: 15 minutes    Eric was on the phone for about 15 minutes today with me.  He has this unusual complaint where he has had five or six months of hypopharyngeal-type of pain at the back of his tongue, some white areas in the back of his tongue.  No history of some kind of issues of almost like some chest pain in the past.  This has been going on for quite some time.  It is quite intermittent.  We tried to do simple things and ease off on his medicines last time and use similar mouthwashes.  He states that he is otherwise getting by about the same.      We talked today about what we should pursue next and we thought that we should pursue Mycelex Troches.  If they are not any better, then go ahead and do a Bravo pH probe and " then after that go ahead and do a CAT scan of the sinuses.  That seems to be the best thing for him.  We talked to him about the benefits of this and what the test might reveal as well.      Raghavendra Sandoval MD

## 2020-08-11 NOTE — LETTER
"8/11/2020       RE: Eric Espinosa  [address]   [facility] [address]     Dear Colleague,    Thank you for referring your patient, Eric Espinosa, to the [facility] EAR NOSE AND THROAT at [facility]. Please see a copy of my visit note below.    Eric Espinosa is a 65 year old male who is being evaluated via a billable telephone visit.      The patient has been notified of following:     \"This telephone visit will be conducted via a call between you and your physician/provider. We have found that certain health care needs can be provided without the need for a physical exam.  This service lets us provide the care you need with a short phone conversation.  If a prescription is necessary we can send it directly to your pharmacy.  If lab work is needed we can place an order for that and you can then stop by our lab to have the test done at a later time.    Telephone visits are billed at different rates depending on your insurance coverage. During this emergency period, for some insurers they may be billed the same as an in-person visit.  Please reach out to your insurance provider with any questions.    If during the course of the call the physician/provider feels a telephone visit is not appropriate, you will not be charged for this service.\"    Patient has given verbal consent for Telephone visit?  Yes    What phone number would you like to be contacted at? [phone]    How would you like to obtain your AVS? Abdias    Phone call duration: 15 minutes    Eric was on the phone for about 15 minutes today with me.  He has this unusual complaint where he has had five or six months of hypopharyngeal-type of pain at the back of his tongue, some white areas in the back of his tongue.  No history of some kind of issues of almost like some chest pain in the past.  This has been going on for quite some time.  It is quite intermittent.  We tried to do simple things and ease off on his " medicines last time and use similar mouthwashes.  He states that he is otherwise getting by about the same.      We talked today about what we should pursue next and we thought that we should pursue Mycelex Troches.  If they are not any better, then go ahead and do a Bravo pH probe and then after that go ahead and do a CAT scan of the sinuses.  That seems to be the best thing for him.  We talked to him about the benefits of this and what the test might reveal as well.      Raghavendra Sandoval MD

## 2020-08-11 NOTE — PATIENT INSTRUCTIONS
In the following order.     1) lozenges.   2) In 3 weeks of not better Garcia Ph Probe study.   3) If 2 is not revealing, get sinus CT.     Raghavendra Sandoval MD

## 2020-08-12 ENCOUNTER — TELEPHONE (OUTPATIENT)
Dept: GASTROENTEROLOGY | Facility: CLINIC | Age: 65
End: 2020-08-12

## 2020-08-12 DIAGNOSIS — B37.0 THRUSH: Primary | ICD-10-CM

## 2020-08-13 ENCOUNTER — TELEPHONE (OUTPATIENT)
Dept: GASTROENTEROLOGY | Facility: CLINIC | Age: 65
End: 2020-08-13

## 2020-08-13 DIAGNOSIS — Z11.59 ENCOUNTER FOR SCREENING FOR OTHER VIRAL DISEASES: Primary | ICD-10-CM

## 2020-08-13 NOTE — TELEPHONE ENCOUNTER
Patient is scheduled for EGD with Garcia with Dr. Villalobos    Spoke with: patient    Date of Procedure: 9-2-2020    Location: [facility]    Sedation Type CS    Informed patient they will need an adult  - yes    Informed Patient of COVID Test Requirement - yes    Preferred Pharmacy for Pre Prescription - see chart    Confirmed Nurse will call to complete assessment - yes    Additional comments: none

## 2020-09-13 DIAGNOSIS — Z11.59 ENCOUNTER FOR SCREENING FOR OTHER VIRAL DISEASES: ICD-10-CM

## 2020-09-13 PROCEDURE — U0003 INFECTIOUS AGENT DETECTION BY NUCLEIC ACID (DNA OR RNA); SEVERE ACUTE RESPIRATORY SYNDROME CORONAVIRUS 2 (SARS-COV-2) (CORONAVIRUS DISEASE [COVID-19]), AMPLIFIED PROBE TECHNIQUE, MAKING USE OF HIGH THROUGHPUT TECHNOLOGIES AS DESCRIBED BY CMS-2020-01-R: HCPCS | Performed by: INTERNAL MEDICINE

## 2020-09-15 LAB
SARS-COV-2 RNA SPEC QL NAA+PROBE: NOT DETECTED
SPECIMEN SOURCE: NORMAL

## 2020-09-16 ENCOUNTER — HOSPITAL ENCOUNTER (OUTPATIENT)
Facility: CLINIC | Age: 65
Discharge: HOME | End: 2020-09-16
Attending: INTERNAL MEDICINE | Admitting: INTERNAL MEDICINE
Payer: COMMERCIAL

## 2020-09-16 VITALS
HEART RATE: 58 BPM | RESPIRATION RATE: 19 BRPM | OXYGEN SATURATION: 97 % | DIASTOLIC BLOOD PRESSURE: 72 MMHG | SYSTOLIC BLOOD PRESSURE: 104 MMHG

## 2020-09-16 LAB — UPPER GI ENDOSCOPY: NORMAL

## 2020-09-16 PROCEDURE — 25000128 H RX IP 250 OP 636: Performed by: INTERNAL MEDICINE

## 2020-09-16 PROCEDURE — 91035 G-ESOPH REFLX TST W/ELECTROD: CPT | Performed by: INTERNAL MEDICINE

## 2020-09-16 PROCEDURE — 25000132 ZZH RX MED GY IP 250 OP 250 PS 637: Performed by: INTERNAL MEDICINE

## 2020-09-16 PROCEDURE — 25000125 ZZHC RX 250: Performed by: INTERNAL MEDICINE

## 2020-09-16 PROCEDURE — G0500 MOD SEDAT ENDO SERVICE >5YRS: HCPCS | Performed by: INTERNAL MEDICINE

## 2020-09-16 RX ORDER — ONDANSETRON 4 MG/1
4 TABLET, ORALLY DISINTEGRATING ORAL EVERY 6 HOURS PRN
Status: ACTIVE | OUTPATIENT
Start: 2020-09-16

## 2020-09-16 RX ORDER — NALOXONE HYDROCHLORIDE 0.4 MG/ML
.1-.4 INJECTION, SOLUTION INTRAMUSCULAR; INTRAVENOUS; SUBCUTANEOUS
Status: ACTIVE | OUTPATIENT
Start: 2020-09-16 | End: 2020-09-17

## 2020-09-16 RX ORDER — FLUMAZENIL 0.1 MG/ML
0.2 INJECTION, SOLUTION INTRAVENOUS
Status: ACTIVE | OUTPATIENT
Start: 2020-09-16 | End: 2020-09-17

## 2020-09-16 RX ORDER — ONDANSETRON 2 MG/ML
4 INJECTION INTRAMUSCULAR; INTRAVENOUS EVERY 6 HOURS PRN
Status: ACTIVE | OUTPATIENT
Start: 2020-09-16

## 2020-09-16 RX ORDER — ONDANSETRON 2 MG/ML
4 INJECTION INTRAMUSCULAR; INTRAVENOUS
Status: DISCONTINUED | OUTPATIENT
Start: 2020-09-16 | End: 2020-09-16 | Stop reason: HOSPADM

## 2020-09-16 RX ORDER — LIDOCAINE 40 MG/G
CREAM TOPICAL
Status: DISCONTINUED | OUTPATIENT
Start: 2020-09-16 | End: 2020-09-16 | Stop reason: HOSPADM

## 2020-09-16 RX ORDER — SIMETHICONE
LIQUID (ML) MISCELLANEOUS PRN
Status: DISCONTINUED | OUTPATIENT
Start: 2020-09-16 | End: 2020-09-16 | Stop reason: HOSPADM

## 2020-09-16 RX ORDER — FENTANYL CITRATE 50 UG/ML
INJECTION, SOLUTION INTRAMUSCULAR; INTRAVENOUS PRN
Status: DISCONTINUED | OUTPATIENT
Start: 2020-09-16 | End: 2020-09-16 | Stop reason: HOSPADM

## 2020-09-16 NOTE — OR NURSING
EGD with BRAVO placement under conscious sedation.  Pt tolerated procedure.  GE junction 44,  Garcia placed 38 cm.

## 2020-09-16 NOTE — DISCHARGE INSTRUCTIONS
Discharge Instructions after Endoscopic Ultrasound    Activity  You were given medicine for pain. You may be dizzy or sleepy.    For 24 hours:    Do not drive or use heavy equipment.    Do not make important decisions.    Do not drink any alcohol.    Diet  Wait one hour before eating or drinking. Start with sips of water. When your gag reflex has returned you  may go back to your usual diet, medicines and light exercise.    Discomfort    Some bloating is normal. You may have large burps or pass air.    You may have a sore throat for 2 to 3 days. It may help to:    Avoid hot liquids for 24 hours.    Use sore throat lozenges.    Gargle as needed with salt water up to 4 times a day. Mix 1 cup of warm water with 1 teaspoon of salt. Do not swallow.    You may take Tylenol (acetaminophen) for pain unless your doctor has told you not to.    Do not take aspirin or ibuprofen (Advil, Motrin, or other anti-inflammatory  drugs) during test        When to call    Call right away if you have:    Severe throat pain or trouble swallowing    Black stools (tar-like looking bowel movement)    Fever above 100.6 F (37.5 C)    Unusual pain in belly or chest not relieved by belching or passing air.    If you vomit blood or have severe pain, go to an emergency room.    If you have questions, call    Monday to Friday, 7 a.m. to 4:30 p.m.: Endoscopy: [phone]  After hours: Hospital: [phone] (Ask for the GI fellow on call)

## 2020-09-17 ENCOUNTER — TELEPHONE (OUTPATIENT)
Dept: GASTROENTEROLOGY | Facility: CLINIC | Age: 65
End: 2020-09-17

## 2020-09-18 NOTE — TELEPHONE ENCOUNTER
Staff message received regarding questions/concerns patient had about Bravo recorder after Bravo capsule placed on 9/16.  Called patient and patient explained that, beginning this afternoon, Bravo recorder beeped consistently for a little while. Patient did attempt to reset the recorder by bring it to his chest and holding for 30 seconds. Recorder still beeps intermittently, but not consistently. Patient did say recorder appears to have battery life, as icons still displayed when he pushed buttons.  Patient stated recorder was flashing the blue light.  Per [employer] rep, this indicates that the recorder is still communicating with Bravo capsule.  Advised patient to continue recording symptoms, etc and monitor for blue flashing light despite beeping. Pt agreeable.

## 2020-09-30 ENCOUNTER — TELEPHONE (OUTPATIENT)
Dept: OTOLARYNGOLOGY | Facility: CLINIC | Age: 65
End: 2020-09-30

## 2020-09-30 NOTE — TELEPHONE ENCOUNTER
Writer called patient in regards to reporting recent PH bravo study.     - No grossly evidence abnormalities in the oropharynx or                        hypopharynx. Previously evaluated by ENT.                        - Normal esophagus. No endoscopic evidence of                        esophagitis, stricture or La's.                        - Z-line irregular, 44 cm from the incisors.                        - Gastroesophageal flap valve classified as Hill Grade                        II (fold present, opens with respiration).                        - Normal stomach.                        - Normal examined duodenum.                        - The BRAVO pH capsule was deployed.                        - No specimens collected.     Patient acknowledged this info and stated that he received a print out of these results.    Writer stated that capsule reading is not back yet and we would reach out to him once we get that.    He acknowledged.    Irene Davenport LPN

## 2020-10-05 ENCOUNTER — PRE VISIT (OUTPATIENT)
Dept: UROLOGY | Facility: CLINIC | Age: 65
End: 2020-10-05

## 2020-10-09 ENCOUNTER — MYC MEDICAL ADVICE (OUTPATIENT)
Dept: INTERNAL MEDICINE | Facility: CLINIC | Age: 65
End: 2020-10-09

## 2020-10-09 DIAGNOSIS — K13.70 ORAL MUCOSAL LESION: Primary | ICD-10-CM

## 2020-10-09 NOTE — TELEPHONE ENCOUNTER
[facility] Call Center    Phone Message    May a detailed message be left on voicemail: yes     Reason for Call: Other: . pt is requesting a response to his original inquiry.    Action Taken: Message routed to:  [facility] ([facility]): kaye    Travel Screening: Not Applicable

## 2020-10-09 NOTE — PROGRESS NOTES
Writer ordering CT sinus per Dr. Sandoval's recommendations. Writer notified patient of this via My chart.       Irene Davenport LPN

## 2020-10-10 ENCOUNTER — NURSE TRIAGE (OUTPATIENT)
Dept: NURSING | Facility: CLINIC | Age: 65
End: 2020-10-10

## 2020-10-10 ENCOUNTER — OFFICE VISIT (OUTPATIENT)
Dept: URGENT CARE | Facility: URGENT CARE | Age: 65
End: 2020-10-10
Payer: COMMERCIAL

## 2020-10-10 VITALS
OXYGEN SATURATION: 97 % | WEIGHT: 168 LBS | TEMPERATURE: 99.3 F | HEIGHT: 70 IN | BODY MASS INDEX: 24.05 KG/M2 | SYSTOLIC BLOOD PRESSURE: 124 MMHG | HEART RATE: 83 BPM | DIASTOLIC BLOOD PRESSURE: 72 MMHG

## 2020-10-10 DIAGNOSIS — R30.0 DYSURIA: ICD-10-CM

## 2020-10-10 DIAGNOSIS — R82.90 NONSPECIFIC FINDING ON EXAMINATION OF URINE: Primary | ICD-10-CM

## 2020-10-10 LAB
ALBUMIN UR-MCNC: 30 MG/DL
APPEARANCE UR: CLEAR
BACTERIA #/AREA URNS HPF: ABNORMAL /HPF
BILIRUB UR QL STRIP: NEGATIVE
COLOR UR AUTO: YELLOW
GLUCOSE UR STRIP-MCNC: NEGATIVE MG/DL
HGB UR QL STRIP: ABNORMAL
KETONES UR STRIP-MCNC: ABNORMAL MG/DL
LEUKOCYTE ESTERASE UR QL STRIP: ABNORMAL
NITRATE UR QL: NEGATIVE
NON-SQ EPI CELLS #/AREA URNS LPF: ABNORMAL /LPF
PH UR STRIP: 7 PH (ref 5–7)
RBC #/AREA URNS AUTO: ABNORMAL /HPF
SOURCE: ABNORMAL
SP GR UR STRIP: 1.02 (ref 1–1.03)
UROBILINOGEN UR STRIP-ACNC: 0.2 EU/DL (ref 0.2–1)
WBC #/AREA URNS AUTO: ABNORMAL /HPF

## 2020-10-10 PROCEDURE — 99214 OFFICE O/P EST MOD 30 MIN: CPT | Performed by: PREVENTIVE MEDICINE

## 2020-10-10 PROCEDURE — 87086 URINE CULTURE/COLONY COUNT: CPT | Performed by: PREVENTIVE MEDICINE

## 2020-10-10 PROCEDURE — 81001 URINALYSIS AUTO W/SCOPE: CPT | Performed by: PREVENTIVE MEDICINE

## 2020-10-10 PROCEDURE — 87088 URINE BACTERIA CULTURE: CPT | Performed by: PREVENTIVE MEDICINE

## 2020-10-10 PROCEDURE — 87186 SC STD MICRODIL/AGAR DIL: CPT | Performed by: PREVENTIVE MEDICINE

## 2020-10-10 RX ORDER — SULFAMETHOXAZOLE/TRIMETHOPRIM 800-160 MG
1 TABLET ORAL 2 TIMES DAILY
Qty: 14 TABLET | Refills: 0 | Status: SHIPPED | OUTPATIENT
Start: 2020-10-10 | End: 2020-10-17

## 2020-10-10 ASSESSMENT — MIFFLIN-ST. JEOR: SCORE: 1563.29

## 2020-10-10 NOTE — PROGRESS NOTES
SUBJECTIVE:   Eric Espinosa is a 65 year old male who  presents today for a possible UTI. Symptoms of dysuria and frequency have been going on for 3day(s).  Hematuria no.  sudden onsetand moderate.  There is no history of fever, chills, nausea or vomiting.  No history of penile discharge. This patient does have a history of urinary tract infections. Patient denies long duration, rigors, flank pain, temperature > 101 degrees F., Vomiting, significant nausea or diarrhea, taking Coumadin and GFR less than 30 within the last year     Past Medical History:   Diagnosis Date     Arthritis      Autoimmune disease (H)      Basal cell carcinoma      Chronic tonsillitis      Hoarseness      Skin cancer      Current Outpatient Medications   Medication Sig Dispense Refill     sulfamethoxazole-trimethoprim (BACTRIM DS) 800-160 MG tablet Take 1 tablet by mouth 2 times daily for 7 days 14 tablet 0     ARTIFICIAL TEAR OP Apply 1 drop to eye as needed       atorvastatin (LIPITOR) 20 MG tablet Take 1 tablet (20 mg) by mouth daily (Patient not taking: Reported on 10/10/2020) 90 tablet 3     fluocinonide (LIDEX) 0.05 % solution Apply topically 2 times daily (Patient not taking: Reported on 10/10/2020) 60 mL 1     ibuprofen (ADVIL,MOTRIN) 200 MG tablet Take 200 mg by mouth every 4 hours as needed       MELATONIN PO Take 5 mg by mouth At Bedtime        zaleplon (SONATA) 5 MG capsule Take 1 capsule (5 mg) by mouth nightly as needed for sleep (Patient not taking: Reported on 10/10/2020) 15 capsule 0     zolpidem (AMBIEN) 5 MG tablet Take 1 tablet (5 mg) by mouth nightly as needed for sleep (Patient not taking: Reported on 10/10/2020) 30 tablet 1     Social History     Tobacco Use     Smoking status: Never Smoker     Smokeless tobacco: Never Used   Substance Use Topics     Alcohol use: Yes   works at [employer]  No recreational drug use or vaping    FH - non contributory    ROS:   Review of systems negative except as stated  "above.    OBJECTIVE:  /72   Pulse 83   Temp 99.3  F (37.4  C) (Oral)   Ht 1.778 m (5' 10\")   Wt 76.2 kg (168 lb)   SpO2 97%   BMI 24.11 kg/m    GENERAL APPEARANCE: healthy, alert and no distress  RESP: lungs clear to auscultation - no rales, rhonchi or wheezes  CV: regular rates and rhythm, normal S1 S2, no murmur noted  ABDOMEN:  soft, nontender, no HSM or masses and bowel sounds normal  BACK: No CVA tenderness  SKIN: no suspicious lesions or rashes    ASSESSMENT:   Lower, uncomplicated urinary tract infection.  Bactrim for one week  U cx pending  Probiotic while on abx    PLAN:  As per ordered above  Drink plenty of fluids.  Prevention and treatment of UTI's discussed.Signs and symptoms of pyelonephritis mentioned.  Follow up with primary care physician if not improving    "

## 2020-10-10 NOTE — PATIENT INSTRUCTIONS
Lower, uncomplicated urinary tract infection.  Bactrim for one week  U cx pending  Probiotic while on abx

## 2020-10-10 NOTE — TELEPHONE ENCOUNTER
RN triage call from patient  Patient reports noticing on Thursday  Painful urination frequency and urgency  States his temperature has ranged from normal to 101.4 and has had chills  No blood in urine can go more than a few drops  Is staying hydrated     Gave disposition to be seen in urgent care this morning and patient was agreeable, he will go to [facility]   Kimberly Gupta RN  [facility] Nurse Advisor                Additional Information    Negative: [1] Decreased urination and [2] drinking very little AND [2] dehydration suspected (e.g., dark urine, no urine > 12 hours, very dry mouth, very lightheaded)    Negative: Patient sounds very sick or weak to the triager    Fever > 100.5 F (38.1 C)    Protocols used: URINARY SYMPTOMS-A-AH

## 2020-10-12 DIAGNOSIS — N30.00 ACUTE CYSTITIS WITHOUT HEMATURIA: Primary | ICD-10-CM

## 2020-10-12 LAB
BACTERIA SPEC CULT: ABNORMAL
Lab: ABNORMAL
SPECIMEN SOURCE: ABNORMAL

## 2020-10-12 RX ORDER — CEPHALEXIN 500 MG/1
500 CAPSULE ORAL 3 TIMES DAILY
Qty: 21 CAPSULE | Refills: 0 | Status: SHIPPED | OUTPATIENT
Start: 2020-10-12 | End: 2020-10-19

## 2020-10-12 NOTE — TELEPHONE ENCOUNTER
Respond to MyChart.      FYI: we are currently 2-3 days behind on messages.    Mitesh Watkins CMA ([facility]) at 9:58 AM on 10/12/2020

## 2020-10-14 ENCOUNTER — TELEPHONE (OUTPATIENT)
Dept: URGENT CARE | Facility: URGENT CARE | Age: 65
End: 2020-10-14

## 2020-10-14 NOTE — TELEPHONE ENCOUNTER
Spoke with pt and informed of results. Results send to home address as he didn't see them in mychart.

## 2020-10-14 NOTE — TELEPHONE ENCOUNTER
Left message for pt to call back re:    Natalie Esposito MD  P [facility] Staff             Please inform patient that I sent prescription Keflex 500 mg 3 times a day for 1 week at [facility] as bacteria grew out resistant to antibiotic given Bactrim.      Pt was also sent mychart message.  zak arteaga

## 2020-10-22 ENCOUNTER — ANCILLARY PROCEDURE (OUTPATIENT)
Dept: CT IMAGING | Facility: CLINIC | Age: 65
End: 2020-10-22
Attending: OTOLARYNGOLOGY
Payer: COMMERCIAL

## 2020-10-22 DIAGNOSIS — K13.70 ORAL MUCOSAL LESION: ICD-10-CM

## 2020-10-22 PROCEDURE — 70488 CT MAXILLOFACIAL W/O & W/DYE: CPT | Performed by: RADIOLOGY

## 2020-10-22 RX ORDER — IOPAMIDOL 755 MG/ML
75 INJECTION, SOLUTION INTRAVASCULAR ONCE
Status: COMPLETED | OUTPATIENT
Start: 2020-10-22 | End: 2020-10-22

## 2020-10-22 RX ADMIN — IOPAMIDOL 75 ML: 755 INJECTION, SOLUTION INTRAVASCULAR at 13:21

## 2020-10-23 ENCOUNTER — MYC MEDICAL ADVICE (OUTPATIENT)
Dept: OTOLARYNGOLOGY | Facility: CLINIC | Age: 65
End: 2020-10-23

## 2020-10-23 DIAGNOSIS — R39.89 POSSIBLE URINARY TRACT INFECTION: ICD-10-CM

## 2020-10-23 DIAGNOSIS — E78.00 HIGH BLOOD CHOLESTEROL: ICD-10-CM

## 2020-10-23 DIAGNOSIS — R74.8 ELEVATED LIVER ENZYMES: ICD-10-CM

## 2020-10-23 DIAGNOSIS — R82.90 ABNORMAL URINE: ICD-10-CM

## 2020-10-23 LAB
ALBUMIN SERPL-MCNC: 4 G/DL (ref 3.4–5)
ALBUMIN UR-MCNC: NEGATIVE MG/DL
ALP SERPL-CCNC: 76 U/L (ref 40–150)
ALT SERPL W P-5'-P-CCNC: 107 U/L (ref 0–70)
APPEARANCE UR: NORMAL
AST SERPL W P-5'-P-CCNC: 39 U/L (ref 0–45)
BILIRUB DIRECT SERPL-MCNC: 0.2 MG/DL (ref 0–0.2)
BILIRUB SERPL-MCNC: 0.8 MG/DL (ref 0.2–1.3)
BILIRUB UR QL STRIP: NEGATIVE
CHOLEST SERPL-MCNC: 186 MG/DL
COLOR UR AUTO: YELLOW
GLUCOSE UR STRIP-MCNC: NEGATIVE MG/DL
HDLC SERPL-MCNC: 35 MG/DL
HGB UR QL STRIP: NEGATIVE
KETONES UR STRIP-MCNC: NEGATIVE MG/DL
LDLC SERPL CALC-MCNC: 126 MG/DL
LEUKOCYTE ESTERASE UR QL STRIP: NEGATIVE
NITRATE UR QL: NEGATIVE
NONHDLC SERPL-MCNC: 151 MG/DL
PH UR STRIP: 7 PH (ref 5–7)
PROT SERPL-MCNC: 7.6 G/DL (ref 6.8–8.8)
RBC #/AREA URNS AUTO: 2 /HPF (ref 0–2)
SOURCE: NORMAL
SP GR UR STRIP: 1.02 (ref 1–1.03)
TRIGL SERPL-MCNC: 125 MG/DL
UROBILINOGEN UR STRIP-MCNC: 0 MG/DL (ref 0–2)
WBC #/AREA URNS AUTO: 1 /HPF (ref 0–5)

## 2020-10-23 PROCEDURE — 81001 URINALYSIS AUTO W/SCOPE: CPT | Performed by: PATHOLOGY

## 2020-10-23 PROCEDURE — 36415 COLL VENOUS BLD VENIPUNCTURE: CPT | Performed by: PATHOLOGY

## 2020-10-23 PROCEDURE — 80061 LIPID PANEL: CPT | Performed by: PATHOLOGY

## 2020-10-23 PROCEDURE — 80076 HEPATIC FUNCTION PANEL: CPT | Performed by: PATHOLOGY

## 2020-10-24 DIAGNOSIS — R82.90 ABNORMAL URINE: Primary | ICD-10-CM

## 2020-10-31 ENCOUNTER — TELEPHONE (OUTPATIENT)
Dept: INTERNAL MEDICINE | Facility: CLINIC | Age: 65
End: 2020-10-31

## 2020-10-31 DIAGNOSIS — R74.8 ELEVATED LIVER ENZYMES: Primary | ICD-10-CM

## 2020-10-31 NOTE — TELEPHONE ENCOUNTER
Placed future tests this encounter      Dear Eric;    To follow up on the elevated liver test I recommend some additional laboratory tests, a liver ultrasound, and an appointment with the hepatology doctors. I placed all these orders today and you can call [phone] to schedule these appointments. I'd also be happy to see you back anytime in clinic to discuss the results.    ARRON Garland

## 2020-11-03 ENCOUNTER — VIRTUAL VISIT (OUTPATIENT)
Dept: OTOLARYNGOLOGY | Facility: CLINIC | Age: 65
End: 2020-11-03
Payer: COMMERCIAL

## 2020-11-03 DIAGNOSIS — K13.70 ORAL MUCOSAL LESION: Primary | ICD-10-CM

## 2020-11-03 PROCEDURE — 99214 OFFICE O/P EST MOD 30 MIN: CPT | Mod: TEL | Performed by: OTOLARYNGOLOGY

## 2020-11-03 NOTE — LETTER
11/3/2020       RE: Eric Espinosa  [address]   [place] [address]     Dear Colleague,    Thank you for referring your patient, Eric Espinosa, to the [facility] [place] at [facility]. Please see a copy of my visit note below.    Spoke with patient for 30 minutes egarding probe and othr results . There appearas to be an association with symptoms so I recommended gaviscon for 30 days at  will check with the patient at that time.     100% counselling today,.     .Raghavendra Sandoval MD

## 2020-11-03 NOTE — PROGRESS NOTES
Spoke with patient for 30 minutes egarding probe and othr results . There appearas to be an association with symptoms so I recommended gaviscon for 30 days at  will check with the patient at that time.     100% counselling today,.     .Raghavendra Sandoval MD

## 2020-11-03 NOTE — PATIENT INSTRUCTIONS
1. You were seen in the ENT Clinic today by Dr. Sandoval.  If you have any questions or concerns after your appointment, please call   -  ENT Clinic: [phone]      2.   Plan for telephone visit. We will call you to schedule    Irene Davenport LPN  [facility]- Otolaryngology  [phone]    Or    Kailee Duran RN  [facility]- Otolaryngology   [phone]

## 2020-11-04 DIAGNOSIS — R82.90 ABNORMAL URINE: ICD-10-CM

## 2020-11-04 DIAGNOSIS — R74.8 ELEVATED LIVER ENZYMES: ICD-10-CM

## 2020-11-04 PROCEDURE — 87086 URINE CULTURE/COLONY COUNT: CPT | Mod: 90 | Performed by: PATHOLOGY

## 2020-11-04 PROCEDURE — 86704 HEP B CORE ANTIBODY TOTAL: CPT | Mod: 90 | Performed by: PATHOLOGY

## 2020-11-04 PROCEDURE — 87340 HEPATITIS B SURFACE AG IA: CPT | Mod: 90 | Performed by: PATHOLOGY

## 2020-11-04 PROCEDURE — 86038 ANTINUCLEAR ANTIBODIES: CPT | Mod: 90 | Performed by: PATHOLOGY

## 2020-11-04 PROCEDURE — 83516 IMMUNOASSAY NONANTIBODY: CPT | Mod: 90 | Performed by: PATHOLOGY

## 2020-11-04 PROCEDURE — 86706 HEP B SURFACE ANTIBODY: CPT | Mod: 90 | Performed by: PATHOLOGY

## 2020-11-04 PROCEDURE — 36415 COLL VENOUS BLD VENIPUNCTURE: CPT | Performed by: PATHOLOGY

## 2020-11-04 PROCEDURE — 86803 HEPATITIS C AB TEST: CPT | Mod: 90 | Performed by: PATHOLOGY

## 2020-11-04 PROCEDURE — 99000 SPECIMEN HANDLING OFFICE-LAB: CPT | Performed by: PATHOLOGY

## 2020-11-04 NOTE — TELEPHONE ENCOUNTER
RECORDS RECEIVED FROM: Internal   Appt Date: 11.06.2020   NOTES STATUS DETAILS   OFFICE NOTE from referring provider Internal 10.31.2020 Consult Lanre Garland MD   OFFICE NOTES from other specialists N/A    DISCHARGE SUMMARY from hospital N/A    MEDICATION LIST Internal    LIVER BIOSPY (IF APPLICABLE)      PATHOLOGY REPORTS  N/A    IMAGING     ENDOSCOPY (IF AVAILABLE) Internal 09.16.2020   COLONOSCOPY (IF AVAILABLE) Received 08.30.2016   ULTRASOUND LIVER N/A    CT OF ABDOMEN N/A    MRI OF LIVER N/A    FIBROSCAN, US ELASTOGRAPHY, FIBROSIS SCAN, MR ELASTOGRAPHY N/A    LABS     HEPATIC PANEL (LIVER PANEL) Internal 10.23.2020   BASIC METABOLIC PANEL N/A    COMPLETE METABOLIC PANEL Internal 04.17.2020   COMPLETE BLOOD COUNT (CBC) Internal 04.17.2020   INTERNATIONAL NORMALIZED RATIO (INR) N/A    HEPATITIS C ANTIBODY N/A    HEPATITIS C VIRAL LOAD/PCR N/A    HEPATITIS C GENOTYPE N/A    HEPATITIS B SURFACE ANTIGEN Future 10.31.2020   HEPATITIS B SURFACE ANTIBODY Future 10.31.2020   HEPATITIS B DNA QUANT LEVEL N/A    HEPATITIS B CORE ANTIBODY Future 10.31.2020

## 2020-11-05 LAB
ANA SER QL IF: NEGATIVE
BACTERIA SPEC CULT: NO GROWTH
HBV CORE AB SERPL QL IA: NONREACTIVE
HBV SURFACE AB SERPL IA-ACNC: 0.41 M[IU]/ML
HBV SURFACE AG SERPL QL IA: NONREACTIVE
HCV AB SERPL QL IA: NONREACTIVE
Lab: NORMAL
SMA IGG SER-ACNC: 7 UNITS (ref 0–19)
SPECIMEN SOURCE: NORMAL

## 2020-11-06 ENCOUNTER — PRE VISIT (OUTPATIENT)
Dept: GASTROENTEROLOGY | Facility: CLINIC | Age: 65
End: 2020-11-06

## 2020-11-06 ENCOUNTER — PRE VISIT (OUTPATIENT)
Dept: UROLOGY | Facility: CLINIC | Age: 65
End: 2020-11-06

## 2020-11-06 NOTE — TELEPHONE ENCOUNTER
Reason for visit: UTI consult     Relevant information: BPH    Records/imaging/labs/orders: Referred by Dr. Garland    Pt called: no need for a call    At Rooming: standard

## 2020-11-09 ENCOUNTER — ANCILLARY PROCEDURE (OUTPATIENT)
Dept: ULTRASOUND IMAGING | Facility: CLINIC | Age: 65
End: 2020-11-09
Attending: INTERNAL MEDICINE
Payer: COMMERCIAL

## 2020-11-09 DIAGNOSIS — R74.8 ELEVATED LIVER ENZYMES: ICD-10-CM

## 2020-11-09 PROCEDURE — 76700 US EXAM ABDOM COMPLETE: CPT | Mod: GC | Performed by: RADIOLOGY

## 2020-11-10 ENCOUNTER — OFFICE VISIT (OUTPATIENT)
Dept: UROLOGY | Facility: CLINIC | Age: 65
End: 2020-11-10
Payer: COMMERCIAL

## 2020-11-10 ENCOUNTER — OFFICE VISIT (OUTPATIENT)
Dept: DERMATOLOGY | Facility: CLINIC | Age: 65
End: 2020-11-10
Attending: INTERNAL MEDICINE
Payer: COMMERCIAL

## 2020-11-10 VITALS — HEART RATE: 88 BPM | SYSTOLIC BLOOD PRESSURE: 136 MMHG | DIASTOLIC BLOOD PRESSURE: 73 MMHG

## 2020-11-10 DIAGNOSIS — L82.1 SEBORRHEIC KERATOSES: ICD-10-CM

## 2020-11-10 DIAGNOSIS — N40.1 BENIGN PROSTATIC HYPERPLASIA WITH URINARY HESITANCY: Primary | ICD-10-CM

## 2020-11-10 DIAGNOSIS — J02.9 SORE THROAT: ICD-10-CM

## 2020-11-10 DIAGNOSIS — Z85.828 HISTORY OF NONMELANOMA SKIN CANCER: ICD-10-CM

## 2020-11-10 DIAGNOSIS — R21 RASH AND NONSPECIFIC SKIN ERUPTION: ICD-10-CM

## 2020-11-10 DIAGNOSIS — R39.11 BENIGN PROSTATIC HYPERPLASIA WITH URINARY HESITANCY: Primary | ICD-10-CM

## 2020-11-10 DIAGNOSIS — L81.4 LENTIGO: ICD-10-CM

## 2020-11-10 DIAGNOSIS — D18.01 CHERRY ANGIOMA: ICD-10-CM

## 2020-11-10 DIAGNOSIS — N48.1 BALANITIS: ICD-10-CM

## 2020-11-10 DIAGNOSIS — D22.9 MULTIPLE NEVI: ICD-10-CM

## 2020-11-10 DIAGNOSIS — Z12.83 SKIN EXAM FOR MALIGNANT NEOPLASM: Primary | ICD-10-CM

## 2020-11-10 PROCEDURE — 51798 US URINE CAPACITY MEASURE: CPT | Performed by: NURSE PRACTITIONER

## 2020-11-10 PROCEDURE — 99203 OFFICE O/P NEW LOW 30 MIN: CPT | Mod: 25 | Performed by: NURSE PRACTITIONER

## 2020-11-10 PROCEDURE — 99214 OFFICE O/P EST MOD 30 MIN: CPT | Mod: GC | Performed by: DERMATOLOGY

## 2020-11-10 PROCEDURE — 51741 ELECTRO-UROFLOWMETRY FIRST: CPT | Performed by: NURSE PRACTITIONER

## 2020-11-10 RX ORDER — TAMSULOSIN HYDROCHLORIDE 0.4 MG/1
0.4 CAPSULE ORAL DAILY
Qty: 30 CAPSULE | Refills: 3 | Status: SHIPPED | OUTPATIENT
Start: 2020-11-10 | End: 2021-02-12

## 2020-11-10 RX ORDER — HYDROCORTISONE 25 MG/G
OINTMENT TOPICAL 2 TIMES DAILY
Qty: 30 G | Refills: 11 | Status: SHIPPED | OUTPATIENT
Start: 2020-11-10 | End: 2021-06-24

## 2020-11-10 ASSESSMENT — PAIN SCALES - GENERAL
PAINLEVEL: NO PAIN (0)
PAINLEVEL: NO PAIN (0)

## 2020-11-10 NOTE — PROGRESS NOTES
"[facility] Dermatology Note    Dermatology Problem List:  Last FBSE: 11/10/20    1. History of NMSC  -BCC, nose, s/p Mohs surgery 2015   -BCC, L superior shoulder ([facility] 2013)  -BCC, forehead (2012 w/ Dr. Fernández in [place], [place]).   2. Actinic keratoses, forehead s/p cryo   3. Hemangioma L shoudler s/p excision 4/26/2016 (suspected lymph node)  4. Benign Bx  - IDN, central forehead, s/p shave bx 11/1/19  5 Seborrheic dermatitis  - patch on left temporal scalp with predominant scale  - Current tx: Alternate Selsan blue and head & shoulders for shampooing, lidex solution as needed (discussed 10/19/18)  6. Balanitis  - Current Tx: hydrocortisone 2.5% ointment BID PRN    Encounter Date: Nov 10, 2020    CC:   FBSE    History of Present Illness:  Mr. Eric Espinosa is a 65 year old male with past dermatologic history listed above who presents as a follow-up for a FBSE. The patient was last seen 11/1/19. Main concerns today are: a sore throat that he's had since March and a rash on his penis. Thought it was due to atorvastatin, but persisted after stopping the med. Has seen ENT twice and had a scope (no biopsy). Has tried nystatin swish and spit and clotrimazole lozenges without relief. For his penis, notes that he's had redness and irritation affecting the glans and foreskin. Seeing Urology later this month due to \"difficulty with retracting\" his foreskin    Past Medical History:   Patient Active Problem List   Diagnosis     Psoriasis     Neoplasm of uncertain behavior of skin     AK (actinic keratosis)     BCC (basal cell carcinoma), trunk     Actinic keratosis     History of skin cancer     Diverticulitis     History of basal cell cancer     Dermal nevus     Dermatitis, seborrheic     Past Medical History:   Diagnosis Date     Arthritis      Autoimmune disease (H)      Basal cell carcinoma      Chronic tonsillitis      Hoarseness      Skin cancer      Past Surgical History:   Procedure " Laterality Date     ENT SURGERY  1993    fix deviated septum, scrape turbinates     MOHS MICROGRAPHIC PROCEDURE         Social History:  Patient  reports that he has never smoked. He has never used smokeless tobacco. He reports current alcohol use. He reports that he does not use drugs.    Family History:  Family History   Problem Relation Age of Onset     Diabetes Father      Heart Disease Father      Hypertension Mother      Cancer No family hx of         No family history of skin cancer     Skin Cancer No family hx of      Glaucoma No family hx of      Macular Degeneration No family hx of        Medications:  Current Outpatient Medications   Medication Sig Dispense Refill     ibuprofen (ADVIL,MOTRIN) 200 MG tablet Take 200 mg by mouth every 4 hours as needed       MELATONIN PO Take 5 mg by mouth At Bedtime        ARTIFICIAL TEAR OP Apply 1 drop to eye as needed       atorvastatin (LIPITOR) 20 MG tablet Take 1 tablet (20 mg) by mouth daily (Patient not taking: Reported on 10/10/2020) 90 tablet 3     fluocinonide (LIDEX) 0.05 % solution Apply topically 2 times daily (Patient not taking: Reported on 10/10/2020) 60 mL 1     zaleplon (SONATA) 5 MG capsule Take 1 capsule (5 mg) by mouth nightly as needed for sleep (Patient not taking: Reported on 10/10/2020) 15 capsule 0     zolpidem (AMBIEN) 5 MG tablet Take 1 tablet (5 mg) by mouth nightly as needed for sleep (Patient not taking: Reported on 10/10/2020) 30 tablet 1        Allergies   Allergen Reactions     Ciprofloxacin Rash       Review of Systems:  - Skin/Heme New Pt: The patient denies frequent sun exposure. The patient denies excessive scarring or problems healing except as per HPI. The patient denies excessive bleeding.  - Constitutional: Otherwise feeling well today, in usual state of health.  - Skin: As above in HPI. No additional skin concerns.    Physical exam:  Vitals: There were no vitals taken for this visit.  GEN: This is a well developed, well-nourished  male in no acute distress, in a pleasant mood.    SKIN: Full skin, which includes the head/face, both arms, chest, back, abdomen,both legs, genitalia and/or groin buttocks, digits and/or nails, was examined.  - Normal oral mucosa and posterior oropharynx  - erythematous patch affecting foreskin  - There are dome shaped bright red papules on the trunk.   - Multiple regular brown pigmented macules and papules are identified on the trunk.   - There is no erythema, telangectasias, nodularity, or pigmentation on the nose.  - There are waxy stuck on tan to brown papules on the back, arms, thighs, and trunk.  - Scattered brown macules on sun exposed areas.  - No other lesions of concern on areas examined.     In office labs or procedures performed today:   None    Impression/Plan:  1. Balanitis  - Based off H&P, presenting condition on the glans and foreskin most consistent with balanitis. Will prescribe hydrocortisone BID for assistance with rash and encourage follow up with urology  - Prescribed hydrocortisone 2.5% ointment for inflammation on foreskin  - Provided information in the AVS    2. Sore throat  - Reassuring and normal appearing oral mucosa. Condition may be related to indigestion or chronic allergic rhinitis   - No dermatologic concern  - Recommended follow up with PCP    3. Multiple clinically banal appearing nevi  4. Solar lentigines  - ABCDs of melanoma were discussed and self skin checks were advised. Sun precaution was advised including the use of sun screens of SPF 30 or higher, sun protective clothing, and avoidance of tanning beds.    5. Cherry angioma(s)  6. Seborrheic keratosis, non irritated  - Discussed the possible etiologies, clinical course, and management options of this benign condition with the patient.    7. History of nonmelanoma skin cancer  - No clincial evidence of recurrence  - ABCDs of melanoma were discussed and self skin checks were advised.   - Sun precaution was advised including  the use of sun screens of SPF 30 or higher, sun protective clothing, and avoidance of tanning beds.  - We will continue to monitor the spot    Follow-up in 12 weeks (for BALANITIS FOLLOW UP) and 1 year for FBSE, earlier for new or changing lesions.     Staff Involved:  Patient was seen and staffed with attending physician Dr. Shantanu Ashford MD  Med/Derm Resident PGY-3  P:[phone]    Staff Addendum:  I, Kirstin Fournier MD, saw this patient with the resident and agree with the resident s findings and plan of care as documented in the resident s note.

## 2020-11-10 NOTE — LETTER
"11/10/2020       RE: Eric Espinosa  [address] [place] [address]     Dear Colleague,    Thank you for referring your patient, Eric Espinosa, to the [facility] DERMATOLOGY CLINIC [place] at [facility]. Please see a copy of my visit note below.    [facility] Dermatology Note    Dermatology Problem List:  Last FBSE: 11/10/20    1. History of NMSC  -BCC, nose, s/p Mohs surgery 2015   -BCC, L superior shoulder ([facility] 2013)  -BCC, forehead (2012 w/ Dr. Fernández in [place], [place]).   2. Actinic keratoses, forehead s/p cryo   3. Hemangioma L shoudler s/p excision 4/26/2016 (suspected lymph node)  4. Benign Bx  - IDN, central forehead, s/p shave bx 11/1/19  5 Seborrheic dermatitis  - patch on left temporal scalp with predominant scale  - Current tx: Alternate Selsan blue and head & shoulders for shampooing, lidex solution as needed (discussed 10/19/18)  6. Balanitis  - Current Tx: hydrocortisone 2.5% ointment BID PRN    Encounter Date: Nov 10, 2020    CC:   FBSE    History of Present Illness:  Mr. Eric Espinosa is a 65 year old male with past dermatologic history listed above who presents as a follow-up for a FBSE. The patient was last seen 11/1/19. Main concerns today are: a sore throat that he's had since March and a rash on his penis. Thought it was due to atorvastatin, but persisted after stopping the med. Has seen ENT twice and had a scope (no biopsy). Has tried nystatin swish and spit and clotrimazole lozenges without relief. For his penis, notes that he's had redness and irritation affecting the glans and foreskin. Seeing Urology later this month due to \"difficulty with retracting\" his foreskin    Past Medical History:   Patient Active Problem List   Diagnosis     Psoriasis     Neoplasm of uncertain behavior of skin     AK (actinic keratosis)     BCC (basal cell carcinoma), trunk     Actinic keratosis     History of skin cancer     " Diverticulitis     History of basal cell cancer     Dermal nevus     Dermatitis, seborrheic     Past Medical History:   Diagnosis Date     Arthritis      Autoimmune disease (H)      Basal cell carcinoma      Chronic tonsillitis      Hoarseness      Skin cancer      Past Surgical History:   Procedure Laterality Date     ENT SURGERY  1993    fix deviated septum, scrape turbinates     MOHS MICROGRAPHIC PROCEDURE         Social History:  Patient  reports that he has never smoked. He has never used smokeless tobacco. He reports current alcohol use. He reports that he does not use drugs.    Family History:  Family History   Problem Relation Age of Onset     Diabetes Father      Heart Disease Father      Hypertension Mother      Cancer No family hx of         No family history of skin cancer     Skin Cancer No family hx of      Glaucoma No family hx of      Macular Degeneration No family hx of        Medications:  Current Outpatient Medications   Medication Sig Dispense Refill     ibuprofen (ADVIL,MOTRIN) 200 MG tablet Take 200 mg by mouth every 4 hours as needed       MELATONIN PO Take 5 mg by mouth At Bedtime        ARTIFICIAL TEAR OP Apply 1 drop to eye as needed       atorvastatin (LIPITOR) 20 MG tablet Take 1 tablet (20 mg) by mouth daily (Patient not taking: Reported on 10/10/2020) 90 tablet 3     fluocinonide (LIDEX) 0.05 % solution Apply topically 2 times daily (Patient not taking: Reported on 10/10/2020) 60 mL 1     zaleplon (SONATA) 5 MG capsule Take 1 capsule (5 mg) by mouth nightly as needed for sleep (Patient not taking: Reported on 10/10/2020) 15 capsule 0     zolpidem (AMBIEN) 5 MG tablet Take 1 tablet (5 mg) by mouth nightly as needed for sleep (Patient not taking: Reported on 10/10/2020) 30 tablet 1        Allergies   Allergen Reactions     Ciprofloxacin Rash       Review of Systems:  - Skin/Heme New Pt: The patient denies frequent sun exposure. The patient denies excessive scarring or problems healing  except as per HPI. The patient denies excessive bleeding.  - Constitutional: Otherwise feeling well today, in usual state of health.  - Skin: As above in HPI. No additional skin concerns.    Physical exam:  Vitals: There were no vitals taken for this visit.  GEN: This is a well developed, well-nourished male in no acute distress, in a pleasant mood.    SKIN: Full skin, which includes the head/face, both arms, chest, back, abdomen,both legs, genitalia and/or groin buttocks, digits and/or nails, was examined.  - Normal oral mucosa and posterior oropharynx  - erythematous patch affecting foreskin  - There are dome shaped bright red papules on the trunk.   - Multiple regular brown pigmented macules and papules are identified on the trunk.   - There is no erythema, telangectasias, nodularity, or pigmentation on the nose.  - There are waxy stuck on tan to brown papules on the back, arms, thighs, and trunk.  - Scattered brown macules on sun exposed areas.  - No other lesions of concern on areas examined.     In office labs or procedures performed today:   None    Impression/Plan:  1. Balanitis  - Based off H&P, presenting condition on the glans and foreskin most consistent with balanitis. Will prescribe hydrocortisone BID for assistance with rash and encourage follow up with urology  - Prescribed hydrocortisone 2.5% ointment for inflammation on foreskin  - Provided information in the AVS    2. Sore throat  - Reassuring and normal appearing oral mucosa. Condition may be related to indigestion or chronic allergic rhinitis   - No dermatologic concern  - Recommended follow up with PCP    3. Multiple clinically banal appearing nevi  4. Solar lentigines  - ABCDs of melanoma were discussed and self skin checks were advised. Sun precaution was advised including the use of sun screens of SPF 30 or higher, sun protective clothing, and avoidance of tanning beds.    5. Cherry angioma(s)  6. Seborrheic keratosis, non irritated  -  Discussed the possible etiologies, clinical course, and management options of this benign condition with the patient.    7. History of nonmelanoma skin cancer  - No clincial evidence of recurrence  - ABCDs of melanoma were discussed and self skin checks were advised.   - Sun precaution was advised including the use of sun screens of SPF 30 or higher, sun protective clothing, and avoidance of tanning beds.  - We will continue to monitor the spot    Follow-up in 12 weeks (for BALANITIS FOLLOW UP) and 1 year for FBSE, earlier for new or changing lesions.     Staff Involved:  Patient was seen and staffed with attending physician Dr. Shantanu Ashford MD  Med/Derm Resident PGY-3  P:[phone]    Staff Addendum:  I, Kirstin Fournier MD, saw this patient with the resident and agree with the resident s findings and plan of care as documented in the resident s note.

## 2020-11-10 NOTE — PATIENT INSTRUCTIONS
UROLOGY CLINIC VISIT PATIENT INSTRUCTIONS    -Start Flomax 0.4 mg daily.   -Follow up with me in 3 months- can be a virtual visit.     If you have any issues, questions or concerns in the meantime, do not hesitate to contact us at [phone] or via [employer].     It was a pleasure meeting with you today.  Thank you for allowing me and my team the privilege of caring for you today.  YOU are the reason we are here, and I truly hope we provided you with the excellent service you deserve.  Please let us know if there is anything else we can do for you so that we can be sure you are leaving completely satisfied with your care experience.    Renetta Pavon, CNP

## 2020-11-10 NOTE — LETTER
11/10/2020       RE: Eric Espinosa  [address]   [facility] [address]     Dear Colleague,    Thank you for referring your patient, Eric Espinosa, to the [facility] [place] [facility]. Please see a copy of my visit note below.            Chief Complaint:   LUTS         History of Present Illness:    Eric Espinosa is a very pleasant 65 year old male with who presents for the evaluation of mixed LUTS, including urinary frequency, nocturia, hesitancy, urgency, UUI, intermittency, weak stream, and sensation of incomplete emptying. These symptoms have been present for some time, frequency being present perhaps since childhood. Does not currently take any medications for urination. Last PSA was drawn in 05/2019 was WNL at 0.63. An abdominal US was obtained yesterday in response to elevated liver enzymes, and demonstrated normal-appearing kidneys with no hydronephrosis.     He was diagnosed with a UTI in July, with UC growing E.coli. UC again positive for E.coli on 10/10. He was initially treated with Bactrim but switched to Keflex due to resistance. Denies a significant history of UTIs or stones.     Was seen by dermatology earlier today and reported redness and irritation of his glans and foreskin. He was started on topical hydrocortisone cream to be applied to this area twice daily.          Past Medical History:     Past Medical History:   Diagnosis Date     Arthritis      Autoimmune disease (H)      Basal cell carcinoma      Chronic tonsillitis      Hoarseness      Skin cancer             Past Surgical History:     Past Surgical History:   Procedure Laterality Date     ENT SURGERY  1993    fix deviated septum, scrape turbinates     MOHS MICROGRAPHIC PROCEDURE              Medications     Current Outpatient Medications   Medication     ARTIFICIAL TEAR OP     atorvastatin (LIPITOR) 20 MG tablet     fluocinonide (LIDEX) 0.05 % solution      hydrocortisone 2.5 % ointment     ibuprofen (ADVIL,MOTRIN) 200 MG tablet     MELATONIN PO     zaleplon (SONATA) 5 MG capsule     zolpidem (AMBIEN) 5 MG tablet     No current facility-administered medications for this visit.      Facility-Administered Medications Ordered in Other Visits   Medication     May continue current IV fluid if patient has IV fluids infusing until discharge.     ondansetron (ZOFRAN-ODT) ODT tab 4 mg    Or     ondansetron (ZOFRAN) injection 4 mg     sodium chloride (PF) 0.9% PF flush 3 mL            Family History:     Family History   Problem Relation Age of Onset     Diabetes Father      Heart Disease Father      Hypertension Mother      Cancer No family hx of         No family history of skin cancer     Skin Cancer No family hx of      Glaucoma No family hx of      Macular Degeneration No family hx of             Social History:     Social History     Socioeconomic History     Marital status:      Spouse name: Not on file     Number of children: Not on file     Years of education: Not on file     Highest education level: Not on file   Occupational History     Not on file   Social Needs     Financial resource strain: Not on file     Food insecurity     Worry: Not on file     Inability: Not on file     Transportation needs     Medical: Not on file     Non-medical: Not on file   Tobacco Use     Smoking status: Never Smoker     Smokeless tobacco: Never Used   Substance and Sexual Activity     Alcohol use: Yes     Drug use: No     Sexual activity: Yes     Partners: Female     Birth control/protection: Condom   Lifestyle     Physical activity     Days per week: Not on file     Minutes per session: Not on file     Stress: Not on file   Relationships     Social connections     Talks on phone: Not on file     Gets together: Not on file     Attends [employer]ist service: Not on file     Active member of club or organization: Not on file     Attends meetings of clubs or organizations: Not on file      Relationship status: Not on file     Intimate partner violence     Fear of current or ex partner: Not on file     Emotionally abused: Not on file     Physically abused: Not on file     Forced sexual activity: Not on file   Other Topics Concern     Parent/sibling w/ CABG, MI or angioplasty before 65F 55M? Not Asked   Social History Narrative     Works at [employer] in [employer]            Allergies:   Ciprofloxacin         Review of Systems:  From intake questionnaire   Negative 14 system review except as noted on HPI, nurse's note.         Physical Exam:   Patient is a 65 year old  male   Vitals: There were no vitals taken for this visit.  General Appearance Adult: Alert, no acute distress, oriented  Lungs: no respiratory distress, or pursed lip breathing  Heart: No obvious jugular venous distension present  Abdomen: soft, nontender, no organomegaly or masses, There is no height or weight on file to calculate BMI.  : FOX anodular, symmetric     Uroflow and Post-Void Residual by Bladder Scan   Urinary Flow Rate  Peak Flow: 7 mL/s  Average Flow: 4 mL/s  Voided (mL): 85 mL  Residual Volume by Ultrasound: 87 mL  Character of Curve: Intermittent      Labs and Pathology:    I personally reviewed all applicable laboratory data and went over findings with patient  Significant for:    CBC RESULTS:  Recent Labs   Lab Test 04/17/20  1239 05/30/19  0910 05/20/16  2123 05/09/16  0953   WBC 6.7 4.7 5.4 4.8   HGB 15.5 14.9 14.3 14.2    217 211 275        BMP RESULTS:  Recent Labs   Lab Test 04/17/20  1239 05/30/19  0910 05/20/16  2123 05/09/16  0953    140 139 139   POTASSIUM 4.0 4.5 3.8 4.2   CHLORIDE 105 106 104 107   CO2 31 29 29 27   ANIONGAP 3 4 6 5   * 98 82 129*   BUN 19 14 16 21   CR 0.78 0.75 0.84 0.79   GFRESTIMATED >90 >90 >90  Non  GFR Calc   >90  Non  GFR Calc     GFRESTBLACK >90 >90 >90   GFR Calc   >90   GFR Calc     NANCY  9.3 9.1 8.8 8.6       UA RESULTS:   Recent Labs   Lab Test 10/23/20  0758 10/10/20  0849 07/07/20  1527   SG 1.018 1.020 1.015   URINEPH 7.0 7.0 9.0*   NITRITE Negative Negative Negative   RBCU 2 5-10* 6*   WBCU 1 * 18*         PSA RESULTS  PSA   Date Value Ref Range Status   05/30/2019 0.63 0 - 4 ug/L Final     Comment:     Assay Method:  Chemiluminescence using Siemens Vista analyzer   05/09/2016 0.70 0 - 4 ug/L Final   04/09/2014 0.50 0 - 4 ug/L Final     Comment:     PSA results are about 7% lower than our prior method due to a methodology   change   on August 30, 2011.         Imaging:    I personally reviewed all applicable imaging and went over findings with patient.  Significant for:    Results for orders placed or performed in visit on 11/09/20   US Abdomen complete    Narrative    EXAMINATION: US ABDOMEN COMPLETE,  11/9/2020 7:56 AM     COMPARISON: None.    History: elevated liver test; Elevated liver enzymes.     TECHNIQUE: The abdomen was scanned in standard fashion with  specialized ultrasound transducer(s) using both gray-scale and limited  color Doppler techniques.    FINDINGS:  Pancreas: Visualized portions of the head and body of the pancreas are  unremarkable.     Liver: The liver demonstrates normal homogeneous echotexture. No  evidence of a focal hepatic mass. The main portal vein is patent with  antegrade flow, measuring 17.3 cm/s.    Gallbladder:  There is no wall thickening, pericholecystic fluid,  positive sonographic Moreno's sign or evidence for cholelithiasis.    Bile Ducts: Both the intra- and extrahepatic biliary system are of  normal caliber.  The common bile duct measures 3 mm in diameter.    Kidneys: Both kidneys are of normal echotexture, without mass or  hydronephrosis.   The craniocaudal dimensions are: right- 12.2 cm,  left- 12.1 cm.    Spleen: The spleen is normal in size,  measuring 9.1 cm in sagittal  dimension.    Aorta and IVC: The visualized portions of the aorta and  IVC are  unremarkable. The proximal aorta measures 2.4 cm in diameter and the  IVC measures 2 cm in diameter.    Fluid: No evidence of ascites or pleural effusions.      Impression    IMPRESSION:   1.  Normal abdominal ultrasound.    I have personally reviewed the examination and initial interpretation  and I agree with the findings.    ROSA IBANEZ MD            Assessment and Plan:     Assessment: 65 year old male with mixed LUTS (urinary frequency, nocturia, hesitancy, urgency, UUI, intermittency, weak stream, and sensation of incomplete emptying). Diagnosed with two UTIs over the past few months- both showing E.coli. Unclear if this represents repeat vs. persistent infection. Uroflow demonstrates intermittent flow with good bladder emptying (PVR 87 mL), and we reviewed these results. Suspect that he would benefit from the initiation of an alpha blocker, and we discussed proper usage and potential side effects.     Plan:  -Start Flomax 0.4 mg daily.   -Follow up with me in 3 months for a symptom check- can be a virtual visit.    -Will consider repeating PSA at that time.       Renetta Pavon, CNP  Department of Urology        0

## 2020-11-10 NOTE — NURSING NOTE
Chief Complaint   Patient presents with     Consult     UTI x 2 and a rash on his penis       Blood pressure 136/73, pulse 88. There is no height or weight on file to calculate BMI.    Patient Active Problem List   Diagnosis     Psoriasis     Neoplasm of uncertain behavior of skin     AK (actinic keratosis)     BCC (basal cell carcinoma), trunk     Actinic keratosis     History of skin cancer     Diverticulitis     History of basal cell cancer     Dermal nevus     Dermatitis, seborrheic       Allergies   Allergen Reactions     Ciprofloxacin Rash       Current Outpatient Medications   Medication Sig Dispense Refill     ARTIFICIAL TEAR OP Apply 1 drop to eye as needed       hydrocortisone 2.5 % ointment Apply topically 2 times daily To inflammation on penis 30 g 11     ibuprofen (ADVIL,MOTRIN) 200 MG tablet Take 200 mg by mouth every 4 hours as needed       MELATONIN PO Take 5 mg by mouth At Bedtime        atorvastatin (LIPITOR) 20 MG tablet Take 1 tablet (20 mg) by mouth daily (Patient not taking: Reported on 10/10/2020) 90 tablet 3     fluocinonide (LIDEX) 0.05 % solution Apply topically 2 times daily (Patient not taking: Reported on 10/10/2020) 60 mL 1     zaleplon (SONATA) 5 MG capsule Take 1 capsule (5 mg) by mouth nightly as needed for sleep (Patient not taking: Reported on 10/10/2020) 15 capsule 0     zolpidem (AMBIEN) 5 MG tablet Take 1 tablet (5 mg) by mouth nightly as needed for sleep (Patient not taking: Reported on 10/10/2020) 30 tablet 1       Social History     Tobacco Use     Smoking status: Never Smoker     Smokeless tobacco: Never Used   Substance Use Topics     Alcohol use: Yes     Drug use: No       Karen Hogan CMA  11/10/2020  2:56 PM

## 2020-11-10 NOTE — NURSING NOTE
Dermatology Rooming Note    Eric Espinosa's goals for this visit include:   Chief Complaint   Patient presents with     Derm Problem     Eric is here for a skin check, states concerns with allergies or a autoimmune disease. States he's had a sore throat since March and he's seen ENT twice for it.  Also has a penile rash and another rash that comes and goes on his body.         Leonor Mcdowell, ESTRELLITAN

## 2020-11-10 NOTE — PATIENT INSTRUCTIONS
1. Sore throat  - there's nothing concerning from a dermatology stand point  - may be related to seasonal allergies or indigestion    2. Balanitis  - can apply hydrocortisone 2.5% ointment twice a day as needed for inflammation on the glans and foreskin    Patient Education     Balanitis     Balanitis is an inflammation of the head of the penis. It can happen if there is a buildup of germs under the foreskin. These germs could be bacteria, viruses, or fungi. It can also occur from exposure to soaps and other chemicals. In adults, this is most often a complication of diabetes. It can also be due to obesity or poor genital cleaning habits.  If not treated right away, this can lead to a condition called phimosis. This means you can't pull back the foreskin from the head of the penis.  Symptoms of balanitis may include:    Penis pain or soreness    Discharge    Inability to retract the foreskin    Trouble urinating    Inability to get an erection (erectile dysfunction or impotence)  Home care  The following guidelines will help you care for your condition at home:    If you can retract your foreskin:  ? Children. Retract the foreskin and clean with water. Put antibiotic cream or ointment on the penis 3 times a day.  ? Adults. Retract the foreskin and clean with water. Apply clotrimazole cream to the penis 3 times a day unless another medicine was prescribed. You can buy this cream over the counter. Don't have sex while being treated.  ? Sitz baths. Soak the penis and foreskin in warm water while there is inflammation.    If you have diabetes, talk with your healthcare provider about keeping your diabetes in good control.    If you are overweight, talk with your provider about a weight-loss plan.  Follow-up care  Follow up with your healthcare provider, or as advised.  When to get medical advice  Call your healthcare provider right away if any of these occur:    You can't return the retracted foreskin to the forward  position (get medical care right away)    You can't retract the foreskin    Your symptoms get worse    Urine flow is partly or fully blocked  Tatiana last reviewed this educational content on 10/1/2019    [phone] [employer]. [address] [place] [address], [place] [address]. All rights reserved. This information is not intended as a substitute for professional medical care. Always follow your healthcare professional's instructions.

## 2020-11-10 NOTE — PROGRESS NOTES
Chief Complaint:   LUTS         History of Present Illness:    Eric Espinosa is a very pleasant 65 year old male with who presents for the evaluation of mixed LUTS, including urinary frequency, nocturia, hesitancy, urgency, UUI, intermittency, weak stream, and sensation of incomplete emptying. These symptoms have been present for some time, frequency being present perhaps since childhood. Does not currently take any medications for urination. Last PSA was drawn in 05/2019 was WNL at 0.63. An abdominal US was obtained yesterday in response to elevated liver enzymes, and demonstrated normal-appearing kidneys with no hydronephrosis.     He was diagnosed with a UTI in July, with UC growing E.coli. UC again positive for E.coli on 10/10. He was initially treated with Bactrim but switched to Keflex due to resistance. Denies a significant history of UTIs or stones.     Was seen by dermatology earlier today and reported redness and irritation of his glans and foreskin. He was started on topical hydrocortisone cream to be applied to this area twice daily.          Past Medical History:     Past Medical History:   Diagnosis Date     Arthritis      Autoimmune disease (H)      Basal cell carcinoma      Chronic tonsillitis      Hoarseness      Skin cancer             Past Surgical History:     Past Surgical History:   Procedure Laterality Date     ENT SURGERY  1993    fix deviated septum, scrape turbinates     MOHS MICROGRAPHIC PROCEDURE              Medications     Current Outpatient Medications   Medication     ARTIFICIAL TEAR OP     atorvastatin (LIPITOR) 20 MG tablet     fluocinonide (LIDEX) 0.05 % solution     hydrocortisone 2.5 % ointment     ibuprofen (ADVIL,MOTRIN) 200 MG tablet     MELATONIN PO     zaleplon (SONATA) 5 MG capsule     zolpidem (AMBIEN) 5 MG tablet     No current facility-administered medications for this visit.      Facility-Administered Medications Ordered in Other Visits   Medication     May  continue current IV fluid if patient has IV fluids infusing until discharge.     ondansetron (ZOFRAN-ODT) ODT tab 4 mg    Or     ondansetron (ZOFRAN) injection 4 mg     sodium chloride (PF) 0.9% PF flush 3 mL            Family History:     Family History   Problem Relation Age of Onset     Diabetes Father      Heart Disease Father      Hypertension Mother      Cancer No family hx of         No family history of skin cancer     Skin Cancer No family hx of      Glaucoma No family hx of      Macular Degeneration No family hx of             Social History:     Social History     Socioeconomic History     Marital status:      Spouse name: Not on file     Number of children: Not on file     Years of education: Not on file     Highest education level: Not on file   Occupational History     Not on file   Social Needs     Financial resource strain: Not on file     Food insecurity     Worry: Not on file     Inability: Not on file     Transportation needs     Medical: Not on file     Non-medical: Not on file   Tobacco Use     Smoking status: Never Smoker     Smokeless tobacco: Never Used   Substance and Sexual Activity     Alcohol use: Yes     Drug use: No     Sexual activity: Yes     Partners: Female     Birth control/protection: Condom   Lifestyle     Physical activity     Days per week: Not on file     Minutes per session: Not on file     Stress: Not on file   Relationships     Social connections     Talks on phone: Not on file     Gets together: Not on file     Attends Sabianist service: Not on file     Active member of club or organization: Not on file     Attends meetings of clubs or organizations: Not on file     Relationship status: Not on file     Intimate partner violence     Fear of current or ex partner: Not on file     Emotionally abused: Not on file     Physically abused: Not on file     Forced sexual activity: Not on file   Other Topics Concern     Parent/sibling w/ CABG, MI or angioplasty before 65F 55M?  Not Asked   Social History Narrative     Works at [employer] in [place]            Allergies:   Ciprofloxacin         Review of Systems:  From intake questionnaire   Negative 14 system review except as noted on HPI, nurse's note.         Physical Exam:   Patient is a 65 year old  male   Vitals: There were no vitals taken for this visit.  General Appearance Adult: Alert, no acute distress, oriented  Lungs: no respiratory distress, or pursed lip breathing  Heart: No obvious jugular venous distension present  Abdomen: soft, nontender, no organomegaly or masses, There is no height or weight on file to calculate BMI.  : FOX anodular, symmetric     Uroflow and Post-Void Residual by Bladder Scan   Urinary Flow Rate  Peak Flow: 7 mL/s  Average Flow: 4 mL/s  Voided (mL): 85 mL  Residual Volume by Ultrasound: 87 mL  Character of Curve: Intermittent      Labs and Pathology:    I personally reviewed all applicable laboratory data and went over findings with patient  Significant for:    CBC RESULTS:  Recent Labs   Lab Test 04/17/20  1239 05/30/19  0910 05/20/16 2123 05/09/16  0953   WBC 6.7 4.7 5.4 4.8   HGB 15.5 14.9 14.3 14.2    217 211 275        BMP RESULTS:  Recent Labs   Lab Test 04/17/20  1239 05/30/19  0910 05/20/16  2123 05/09/16  0953    140 139 139   POTASSIUM 4.0 4.5 3.8 4.2   CHLORIDE 105 106 104 107   CO2 31 29 29 27   ANIONGAP 3 4 6 5   * 98 82 129*   BUN 19 14 16 21   CR 0.78 0.75 0.84 0.79   GFRESTIMATED >90 >90 >90  Non  GFR Calc   >90  Non  GFR Calc     GFRESTBLACK >90 >90 >90   GFR Calc   >90   GFR Calc     NANCY 9.3 9.1 8.8 8.6       UA RESULTS:   Recent Labs   Lab Test 10/23/20  0758 10/10/20  0849 07/07/20  1527   SG 1.018 1.020 1.015   URINEPH 7.0 7.0 9.0*   NITRITE Negative Negative Negative   RBCU 2 5-10* 6*   WBCU 1 * 18*         PSA RESULTS  PSA   Date Value Ref Range Status   05/30/2019 0.63 0 - 4 ug/L  Final     Comment:     Assay Method:  Chemiluminescence using Siemens Vista analyzer   05/09/2016 0.70 0 - 4 ug/L Final   04/09/2014 0.50 0 - 4 ug/L Final     Comment:     PSA results are about 7% lower than our prior method due to a methodology   change   on August 30, 2011.         Imaging:    I personally reviewed all applicable imaging and went over findings with patient.  Significant for:    Results for orders placed or performed in visit on 11/09/20   US Abdomen complete    Narrative    EXAMINATION: US ABDOMEN COMPLETE,  11/9/2020 7:56 AM     COMPARISON: None.    History: elevated liver test; Elevated liver enzymes.     TECHNIQUE: The abdomen was scanned in standard fashion with  specialized ultrasound transducer(s) using both gray-scale and limited  color Doppler techniques.    FINDINGS:  Pancreas: Visualized portions of the head and body of the pancreas are  unremarkable.     Liver: The liver demonstrates normal homogeneous echotexture. No  evidence of a focal hepatic mass. The main portal vein is patent with  antegrade flow, measuring 17.3 cm/s.    Gallbladder:  There is no wall thickening, pericholecystic fluid,  positive sonographic Moreno's sign or evidence for cholelithiasis.    Bile Ducts: Both the intra- and extrahepatic biliary system are of  normal caliber.  The common bile duct measures 3 mm in diameter.    Kidneys: Both kidneys are of normal echotexture, without mass or  hydronephrosis.   The craniocaudal dimensions are: right- 12.2 cm,  left- 12.1 cm.    Spleen: The spleen is normal in size,  measuring 9.1 cm in sagittal  dimension.    Aorta and IVC: The visualized portions of the aorta and IVC are  unremarkable. The proximal aorta measures 2.4 cm in diameter and the  IVC measures 2 cm in diameter.    Fluid: No evidence of ascites or pleural effusions.      Impression    IMPRESSION:   1.  Normal abdominal ultrasound.    I have personally reviewed the examination and initial interpretation  and I  agree with the findings.    ROSA IBANEZ MD            Assessment and Plan:     Assessment: 65 year old male with mixed LUTS (urinary frequency, nocturia, hesitancy, urgency, UUI, intermittency, weak stream, and sensation of incomplete emptying). Diagnosed with two UTIs over the past few months- both showing E.coli. Unclear if this represents repeat vs. persistent infection. Uroflow demonstrates intermittent flow with good bladder emptying (PVR 87 mL), and we reviewed these results. Suspect that he would benefit from the initiation of an alpha blocker, and we discussed proper usage and potential side effects.     Plan:  -Start Flomax 0.4 mg daily.   -Follow up with me in 3 months for a symptom check- can be a virtual visit.    -Will consider repeating PSA at that time.       Renetta Pavon, CNP  Department of Urology

## 2020-11-20 ENCOUNTER — VIRTUAL VISIT (OUTPATIENT)
Dept: GASTROENTEROLOGY | Facility: CLINIC | Age: 65
End: 2020-11-20
Attending: INTERNAL MEDICINE
Payer: COMMERCIAL

## 2020-11-20 VITALS — WEIGHT: 170 LBS | HEIGHT: 70 IN | BODY MASS INDEX: 24.34 KG/M2

## 2020-11-20 DIAGNOSIS — R74.8 ELEVATED LIVER ENZYMES: ICD-10-CM

## 2020-11-20 PROCEDURE — 99213 OFFICE O/P EST LOW 20 MIN: CPT | Mod: 95 | Performed by: STUDENT IN AN ORGANIZED HEALTH CARE EDUCATION/TRAINING PROGRAM

## 2020-11-20 ASSESSMENT — MIFFLIN-ST. JEOR: SCORE: 1572.36

## 2020-11-20 NOTE — LETTER
"    11/20/2020         RE: Eric Espinosa  [address] [place] [address]        Dear Colleague,    Thank you for referring your patient, Eric Espinosa, to the [facility]. Please see a copy of my visit note below.    Chief Complaint   Patient presents with     RECHECK     LAB RESULTS   Height 1.778 m (5' 10\"), weight 77.1 kg (170 lb).    Eric Espinosa is a 65 year old male who is being evaluated via a billable video visit.      The patient has been notified of following:     \"This video visit will be conducted via a call between you and your physician/provider. We have found that certain health care needs can be provided without the need for an in-person physical exam.  This service lets us provide the care you need with a video conversation.  If a prescription is necessary we can send it directly to your pharmacy.  If lab work is needed we can place an order for that and you can then stop by our lab to have the test done at a later time.    Video visits are billed at different rates depending on your insurance coverage.  Please reach out to your insurance provider with any questions.    If during the course of the call the physician/provider feels a video visit is not appropriate, you will not be charged for this service.\"    Patient has given verbal consent for Video visit? Yes  How would you like to obtain your AVS? MyChart  If you are dropped from the video visit, the video invite should be resent to: Other e-mail: USE DOXIMITY- PATIENT IS IN THE [facility] ROOM   Will anyone else be joining your video visit? No        Video-Visit Details    Type of service:  Video Visit    Video Start Time: 10:25 AM  Video End Time: 10:46 AM    Originating Location (pt. Location): Home    Distant Location (provider location):  [facility]     Platform used for Video Visit: Zenobia Pinto MD     [facility] New " Patient Visit    Date of Visit: November 20, 2020    Reason for referral: Elevated LFTs    Subjective: Mr. Espinosa is a 65 year old man who presents for evaluation of elevated LFTs.     He started on atorvastatin 20 mg daily 3/1 for elevated total cholesterol. LFTs are normal at baseline. LFTs 4/2020 showed ALT 87. It was stopped 4/22 after he developed a sore throat. He had repeat LFTs 10/2020 that were significant for  - again this was off of his statin.     He denies a previous history of liver disease. Had a CT 2016 for diverticulitis that showed a lupis liver. He had a RUQ US for his elevated LFTs that was normal. Denies a previous history of jaundice. Never had a liver biopsy. Has a normal BMI, weight has been around this weight for awhile. Denies significant alcohol use.     Had work up started by his PCP  - Hepatitis B Sag, core ab, C Ab negative   - JASON negative, F-actin negative    Denies persistent muscle cramps. Notes his arm has been a little sore in the past.     ROS: 14 point ROS negative except for positives noted in HPI.    PMHx:  Past Medical History:   Diagnosis Date     Arthritis      Autoimmune disease (H)      Basal cell carcinoma      Chronic tonsillitis      Hoarseness      Skin cancer    Psoriasis  Seborrheic Dermatitis  Diverticulitis    PSHx:  Past Surgical History:   Procedure Laterality Date     ENT SURGERY  1993    fix deviated septum, scrape turbinates     MOHS MICROGRAPHIC PROCEDURE     Umbilical hernia repair    FamHx:  Family History   Problem Relation Age of Onset     Diabetes Father      Heart Disease Father      Hypertension Mother      Cancer No family hx of         No family history of skin cancer     Skin Cancer No family hx of      Glaucoma No family hx of      Macular Degeneration No family hx of    No history of liver disease    SocHx:  Social History     Socioeconomic History     Marital status:      Spouse name: Not on file     Number of children: Not on  file     Years of education: Not on file     Highest education level: Not on file   Occupational History     Not on file   Social Needs     Financial resource strain: Not on file     Food insecurity     Worry: Not on file     Inability: Not on file     Transportation needs     Medical: Not on file     Non-medical: Not on file   Tobacco Use     Smoking status: Never Smoker     Smokeless tobacco: Never Used   Substance and Sexual Activity     Alcohol use: Yes     Drug use: No     Sexual activity: Yes     Partners: Female     Birth control/protection: Condom   Lifestyle     Physical activity     Days per week: Not on file     Minutes per session: Not on file     Stress: Not on file   Relationships     Social connections     Talks on phone: Not on file     Gets together: Not on file     Attends [employer] service: Not on file     Active member of club or organization: Not on file     Attends meetings of clubs or organizations: Not on file     Relationship status: Not on file     Intimate partner violence     Fear of current or ex partner: Not on file     Emotionally abused: Not on file     Physically abused: Not on file     Forced sexual activity: Not on file   Other Topics Concern     Parent/sibling w/ CABG, MI or angioplasty before 65F 55M? Not Asked   Social History Narrative     Works at [employer] in [place]   Lives at home with wife  Non smoker  Drinks 1 drink/week, in the past had 1 drink a night   [employer]    Medications:  Current Outpatient Medications   Medication     ARTIFICIAL TEAR OP     hydrocortisone 2.5 % ointment     ibuprofen (ADVIL,MOTRIN) 200 MG tablet     MELATONIN PO     tamsulosin (FLOMAX) 0.4 MG capsule     atorvastatin (LIPITOR) 20 MG tablet     fluocinonide (LIDEX) 0.05 % solution     zaleplon (SONATA) 5 MG capsule     zolpidem (AMBIEN) 5 MG tablet     No current facility-administered medications for this visit.      Facility-Administered Medications Ordered in Other  "Visits   Medication     May continue current IV fluid if patient has IV fluids infusing until discharge.     ondansetron (ZOFRAN-ODT) ODT tab 4 mg    Or     ondansetron (ZOFRAN) injection 4 mg     sodium chloride (PF) 0.9% PF flush 3 mL       Allergies:  Allergies   Allergen Reactions     Ciprofloxacin Rash       Objective:  Ht 1.778 m (5' 10\")   Wt 77.1 kg (170 lb)   BMI 24.39 kg/m    Constitutional: pleasant maln in NAD  Eyes: non icteric  Respiratory: Normal respiratory excursion   MSK: normal range of motion of visualized extremities  Abd: Non distended  Skin: No jaundice  Psychiatric: normal mood and orientation    Labs:  Lab Results   Component Value Date    AST 39 10/23/2020     Lab Results   Component Value Date     10/23/2020     No results found for: BILICONJ   Lab Results   Component Value Date    BILITOTAL 0.8 10/23/2020     Lab Results   Component Value Date    ALBUMIN 4.0 10/23/2020     Lab Results   Component Value Date    PROTTOTAL 7.6 10/23/2020      Lab Results   Component Value Date    ALKPHOS 76 10/23/2020       Last Renal Panel:  Sodium   Date Value Ref Range Status   04/17/2020 139 133 - 144 mmol/L Final     Potassium   Date Value Ref Range Status   04/17/2020 4.0 3.4 - 5.3 mmol/L Final     Chloride   Date Value Ref Range Status   04/17/2020 105 94 - 109 mmol/L Final     Carbon Dioxide   Date Value Ref Range Status   04/17/2020 31 20 - 32 mmol/L Final     Anion Gap   Date Value Ref Range Status   04/17/2020 3 3 - 14 mmol/L Final     Glucose   Date Value Ref Range Status   04/17/2020 103 (H) 70 - 99 mg/dL Final     Urea Nitrogen   Date Value Ref Range Status   04/17/2020 19 7 - 30 mg/dL Final     Creatinine   Date Value Ref Range Status   04/17/2020 0.78 0.66 - 1.25 mg/dL Final     GFR Estimate   Date Value Ref Range Status   04/17/2020 >90 >60 mL/min/[1.73_m2] Final     Comment:     Non  GFR Calc  Starting 12/18/2018, serum creatinine based estimated GFR (eGFR) will be "   calculated using the Chronic Kidney Disease Epidemiology Collaboration   (CKD-EPI) equation.       Calcium   Date Value Ref Range Status   04/17/2020 9.3 8.5 - 10.1 mg/dL Final     Albumin   Date Value Ref Range Status   10/23/2020 4.0 3.4 - 5.0 g/dL Final       Lab Results   Component Value Date    WBC 6.7 04/17/2020     Lab Results   Component Value Date    RBC 4.89 04/17/2020     Lab Results   Component Value Date    HGB 15.5 04/17/2020     Lab Results   Component Value Date    HCT 45.7 04/17/2020     Lab Results   Component Value Date    MCV 94 04/17/2020     Lab Results   Component Value Date    MCH 31.7 04/17/2020     Lab Results   Component Value Date    MCHC 33.9 04/17/2020     Lab Results   Component Value Date    RDW 12.4 04/17/2020     Lab Results   Component Value Date     04/17/2020       Imaging:    RU US 11/2020    History: elevated liver test; Elevated liver enzymes.      TECHNIQUE: The abdomen was scanned in standard fashion with  specialized ultrasound transducer(s) using both gray-scale and limited  color Doppler techniques.     FINDINGS:  Pancreas: Visualized portions of the head and body of the pancreas are  unremarkable.      Liver: The liver demonstrates normal homogeneous echotexture. No  evidence of a focal hepatic mass. The main portal vein is patent with  antegrade flow, measuring 17.3 cm/s.     Gallbladder:  There is no wall thickening, pericholecystic fluid,  positive sonographic Moreno's sign or evidence for cholelithiasis.     Bile Ducts: Both the intra- and extrahepatic biliary system are of  normal caliber.  The common bile duct measures 3 mm in diameter.     Kidneys: Both kidneys are of normal echotexture, without mass or  hydronephrosis.   The craniocaudal dimensions are: right- 12.2 cm,  left- 12.1 cm.     Spleen: The spleen is normal in size,  measuring 9.1 cm in sagittal  dimension.     Aorta and IVC: The visualized portions of the aorta and IVC are  unremarkable. The  proximal aorta measures 2.4 cm in diameter and the  IVC measures 2 cm in diameter.     Fluid: No evidence of ascites or pleural effusions.                                                                      IMPRESSION:   1.  Normal abdominal ultrasound.    Endoscopy:    EGD 9/2020    Findings:        The visualized posterior oropharynx and hypopharynx were normal. No        abnormalities were seen on the visualized portions of the tongue.        The esophagus was normal.        The Z-line was irregular and was found 44 cm from the incisors.        The gastroesophageal flap valve was visualized endoscopically and        classified as Hill Grade II (fold present, opens with respiration). The        BRAVO capsule with delivery system was introduced through the mouth and        advanced into the esophagus, such that the BRAVO pH capsule was        positioned 38 cm from the incisors, which was 6 cm proximal to the GE        junction. The BRAVO pH capsule was then deployed and attached to the        esophageal mucosa. The delivery system was then withdrawn. Endoscopy was        utilized for probe placement and diagnostic evaluation. The scope was        reinserted to evaluate placement of the BRAVO capsule. Visualization        showed the BRAVO capsule to be in an appropriate position.        The stomach was normal.        The cardia and gastric fundus were normal on retroflexion.        The examined duodenum was normal.                                                                                     Impression:          - No grossly evidence abnormalities in the oropharynx or                        hypopharynx. Previously evaluated by ENT.                        - Normal esophagus. No endoscopic evidence of                        esophagitis, stricture or La's.                        - Z-line irregular, 44 cm from the incisors.                        - Gastroesophageal flap valve classified as Hill Grade                         II (fold present, opens with respiration).                        - Normal stomach.                        - Normal examined duodenum.                        - The BRAVO pH capsule was deployed.                        - No specimens collected.     Assessment/Plan:  Mr. Espinosa is a 65 year old man who presents for evaluation of elevated LFTs. ALT has been modestly elevated on two occasions, persisting after stopping his statin medication. RUQ US was normal. PCP initiated a work up that has been negative thus far for viral hepatitis, auto immune hepatitis. He takes cholestoff plus, no other supplements. No significant alcohol use. Normal BMI without other NAFLD risk factors. No muscular symptoms. Overall unclear etiology of his elevated ALT, but no evidence of chronic liver disease. Would recommend ruling out celiac disease (atypical age presentation, no GI symptoms) and continuing to trend LFTs    - Check TTG IgA, CMP in the next month  - Based on LFTs can consider further work up  - Ok to resume statin if needed from liver standpoint - typically recommend holding if AST/ALT rise to 3x ULN on treatment    RTC 4 months.    Alie Pinto MD [facility]              Again, thank you for allowing me to participate in the care of your patient.        Sincerely,        Alie Pinto MD

## 2020-11-20 NOTE — PROGRESS NOTES
"Chief Complaint   Patient presents with     RECHECK     LAB RESULTS   Height 1.778 m (5' 10\"), weight 77.1 kg (170 lb).    Eric Espinosa is a 65 year old male who is being evaluated via a billable video visit.      The patient has been notified of following:     \"This video visit will be conducted via a call between you and your physician/provider. We have found that certain health care needs can be provided without the need for an in-person physical exam.  This service lets us provide the care you need with a video conversation.  If a prescription is necessary we can send it directly to your pharmacy.  If lab work is needed we can place an order for that and you can then stop by our lab to have the test done at a later time.    Video visits are billed at different rates depending on your insurance coverage.  Please reach out to your insurance provider with any questions.    If during the course of the call the physician/provider feels a video visit is not appropriate, you will not be charged for this service.\"    Patient has given verbal consent for Video visit? Yes  How would you like to obtain your AVS? MyChart  If you are dropped from the video visit, the video invite should be resent to: Other e-mail: USE [email or web address]- PATIENT IS IN THE [facility] ROOM   Will anyone else be joining your video visit? No        Video-Visit Details    Type of service:  Video Visit    Video Start Time: 10:25 AM  Video End Time: 10:46 AM    Originating Location (pt. Location): Home    Distant Location (provider location):  [facility] [place]     Platform used for Video Visit: Zenobia Pinto MD     [facility] New Patient Visit    Date of Visit: November 20, 2020    Reason for referral: Elevated LFTs    Subjective: Mr. Espinosa is a 65 year old man who presents for evaluation of elevated LFTs.     He started on atorvastatin 20 mg daily 3/1 for elevated total cholesterol. LFTs " are normal at baseline. LFTs 4/2020 showed ALT 87. It was stopped 4/22 after he developed a sore throat. He had repeat LFTs 10/2020 that were significant for  - again this was off of his statin.     He denies a previous history of liver disease. Had a CT 2016 for diverticulitis that showed a lupis liver. He had a RUQ US for his elevated LFTs that was normal. Denies a previous history of jaundice. Never had a liver biopsy. Has a normal BMI, weight has been around this weight for awhile. Denies significant alcohol use.     Had work up started by his PCP  - Hepatitis B Sag, core ab, C Ab negative   - JASON negative, F-actin negative    Denies persistent muscle cramps. Notes his arm has been a little sore in the past.     ROS: 14 point ROS negative except for positives noted in HPI.    PMHx:  Past Medical History:   Diagnosis Date     Arthritis      Autoimmune disease (H)      Basal cell carcinoma      Chronic tonsillitis      Hoarseness      Skin cancer    Psoriasis  Seborrheic Dermatitis  Diverticulitis    PSHx:  Past Surgical History:   Procedure Laterality Date     ENT SURGERY  1993    fix deviated septum, scrape turbinates     MOHS MICROGRAPHIC PROCEDURE     Umbilical hernia repair    FamHx:  Family History   Problem Relation Age of Onset     Diabetes Father      Heart Disease Father      Hypertension Mother      Cancer No family hx of         No family history of skin cancer     Skin Cancer No family hx of      Glaucoma No family hx of      Macular Degeneration No family hx of    No history of liver disease    SocHx:  Social History     Socioeconomic History     Marital status:      Spouse name: Not on file     Number of children: Not on file     Years of education: Not on file     Highest education level: Not on file   Occupational History     Not on file   Social Needs     Financial resource strain: Not on file     Food insecurity     Worry: Not on file     Inability: Not on file     Transportation  needs     Medical: Not on file     Non-medical: Not on file   Tobacco Use     Smoking status: Never Smoker     Smokeless tobacco: Never Used   Substance and Sexual Activity     Alcohol use: Yes     Drug use: No     Sexual activity: Yes     Partners: Female     Birth control/protection: Condom   Lifestyle     Physical activity     Days per week: Not on file     Minutes per session: Not on file     Stress: Not on file   Relationships     Social connections     Talks on phone: Not on file     Gets together: Not on file     Attends Christian service: Not on file     Active member of club or organization: Not on file     Attends meetings of clubs or organizations: Not on file     Relationship status: Not on file     Intimate partner violence     Fear of current or ex partner: Not on file     Emotionally abused: Not on file     Physically abused: Not on file     Forced sexual activity: Not on file   Other Topics Concern     Parent/sibling w/ CABG, MI or angioplasty before 65F 55M? Not Asked   Social History Narrative     Works at [employer] in [place]   Lives at home with wife  Non smoker  Drinks 1 drink/week, in the past had 1 drink a night   [employer]    Medications:  Current Outpatient Medications   Medication     ARTIFICIAL TEAR OP     hydrocortisone 2.5 % ointment     ibuprofen (ADVIL,MOTRIN) 200 MG tablet     MELATONIN PO     tamsulosin (FLOMAX) 0.4 MG capsule     atorvastatin (LIPITOR) 20 MG tablet     fluocinonide (LIDEX) 0.05 % solution     zaleplon (SONATA) 5 MG capsule     zolpidem (AMBIEN) 5 MG tablet     No current facility-administered medications for this visit.      Facility-Administered Medications Ordered in Other Visits   Medication     May continue current IV fluid if patient has IV fluids infusing until discharge.     ondansetron (ZOFRAN-ODT) ODT tab 4 mg    Or     ondansetron (ZOFRAN) injection 4 mg     sodium chloride (PF) 0.9% PF flush 3 mL       Allergies:  Allergies  "  Allergen Reactions     Ciprofloxacin Rash       Objective:  Ht 1.778 m (5' 10\")   Wt 77.1 kg (170 lb)   BMI 24.39 kg/m    Constitutional: pleasant maln in NAD  Eyes: non icteric  Respiratory: Normal respiratory excursion   MSK: normal range of motion of visualized extremities  Abd: Non distended  Skin: No jaundice  Psychiatric: normal mood and orientation    Labs:  Lab Results   Component Value Date    AST 39 10/23/2020     Lab Results   Component Value Date     10/23/2020     No results found for: BILICONJ   Lab Results   Component Value Date    BILITOTAL 0.8 10/23/2020     Lab Results   Component Value Date    ALBUMIN 4.0 10/23/2020     Lab Results   Component Value Date    PROTTOTAL 7.6 10/23/2020      Lab Results   Component Value Date    ALKPHOS 76 10/23/2020       Last Renal Panel:  Sodium   Date Value Ref Range Status   04/17/2020 139 133 - 144 mmol/L Final     Potassium   Date Value Ref Range Status   04/17/2020 4.0 3.4 - 5.3 mmol/L Final     Chloride   Date Value Ref Range Status   04/17/2020 105 94 - 109 mmol/L Final     Carbon Dioxide   Date Value Ref Range Status   04/17/2020 31 20 - 32 mmol/L Final     Anion Gap   Date Value Ref Range Status   04/17/2020 3 3 - 14 mmol/L Final     Glucose   Date Value Ref Range Status   04/17/2020 103 (H) 70 - 99 mg/dL Final     Urea Nitrogen   Date Value Ref Range Status   04/17/2020 19 7 - 30 mg/dL Final     Creatinine   Date Value Ref Range Status   04/17/2020 0.78 0.66 - 1.25 mg/dL Final     GFR Estimate   Date Value Ref Range Status   04/17/2020 >90 >60 mL/min/[1.73_m2] Final     Comment:     Non  GFR Calc  Starting 12/18/2018, serum creatinine based estimated GFR (eGFR) will be   calculated using the Chronic Kidney Disease Epidemiology Collaboration   (CKD-EPI) equation.       Calcium   Date Value Ref Range Status   04/17/2020 9.3 8.5 - 10.1 mg/dL Final     Albumin   Date Value Ref Range Status   10/23/2020 4.0 3.4 - 5.0 g/dL Final "       Lab Results   Component Value Date    WBC 6.7 04/17/2020     Lab Results   Component Value Date    RBC 4.89 04/17/2020     Lab Results   Component Value Date    HGB 15.5 04/17/2020     Lab Results   Component Value Date    HCT 45.7 04/17/2020     Lab Results   Component Value Date    MCV 94 04/17/2020     Lab Results   Component Value Date    MCH 31.7 04/17/2020     Lab Results   Component Value Date    MCHC 33.9 04/17/2020     Lab Results   Component Value Date    RDW 12.4 04/17/2020     Lab Results   Component Value Date     04/17/2020       Imaging:    RUQ US 11/2020    History: elevated liver test; Elevated liver enzymes.      TECHNIQUE: The abdomen was scanned in standard fashion with  specialized ultrasound transducer(s) using both gray-scale and limited  color Doppler techniques.     FINDINGS:  Pancreas: Visualized portions of the head and body of the pancreas are  unremarkable.      Liver: The liver demonstrates normal homogeneous echotexture. No  evidence of a focal hepatic mass. The main portal vein is patent with  antegrade flow, measuring 17.3 cm/s.     Gallbladder:  There is no wall thickening, pericholecystic fluid,  positive sonographic Moreno's sign or evidence for cholelithiasis.     Bile Ducts: Both the intra- and extrahepatic biliary system are of  normal caliber.  The common bile duct measures 3 mm in diameter.     Kidneys: Both kidneys are of normal echotexture, without mass or  hydronephrosis.   The craniocaudal dimensions are: right- 12.2 cm,  left- 12.1 cm.     Spleen: The spleen is normal in size,  measuring 9.1 cm in sagittal  dimension.     Aorta and IVC: The visualized portions of the aorta and IVC are  unremarkable. The proximal aorta measures 2.4 cm in diameter and the  IVC measures 2 cm in diameter.     Fluid: No evidence of ascites or pleural effusions.                                                                      IMPRESSION:   1.  Normal abdominal  ultrasound.    Endoscopy:    EGD 9/2020    Findings:        The visualized posterior oropharynx and hypopharynx were normal. No        abnormalities were seen on the visualized portions of the tongue.        The esophagus was normal.        The Z-line was irregular and was found 44 cm from the incisors.        The gastroesophageal flap valve was visualized endoscopically and        classified as Hill Grade II (fold present, opens with respiration). The        BRAVO capsule with delivery system was introduced through the mouth and        advanced into the esophagus, such that the BRAVO pH capsule was        positioned 38 cm from the incisors, which was 6 cm proximal to the GE        junction. The BRAVO pH capsule was then deployed and attached to the        esophageal mucosa. The delivery system was then withdrawn. Endoscopy was        utilized for probe placement and diagnostic evaluation. The scope was        reinserted to evaluate placement of the BRAVO capsule. Visualization        showed the BRAVO capsule to be in an appropriate position.        The stomach was normal.        The cardia and gastric fundus were normal on retroflexion.        The examined duodenum was normal.                                                                                     Impression:          - No grossly evidence abnormalities in the oropharynx or                        hypopharynx. Previously evaluated by ENT.                        - Normal esophagus. No endoscopic evidence of                        esophagitis, stricture or La's.                        - Z-line irregular, 44 cm from the incisors.                        - Gastroesophageal flap valve classified as Hill Grade                        II (fold present, opens with respiration).                        - Normal stomach.                        - Normal examined duodenum.                        - The BRAVO pH capsule was deployed.                        - No  specimens collected.     Assessment/Plan:  Mr. Espinosa is a 65 year old man who presents for evaluation of elevated LFTs. ALT has been modestly elevated on two occasions, persisting after stopping his statin medication. RUQ US was normal. PCP initiated a work up that has been negative thus far for viral hepatitis, auto immune hepatitis. He takes cholestoff plus, no other supplements. No significant alcohol use. Normal BMI without other NAFLD risk factors. No muscular symptoms. Overall unclear etiology of his elevated ALT, but no evidence of chronic liver disease. Would recommend ruling out celiac disease (atypical age presentation, no GI symptoms) and continuing to trend LFTs    - Check TTG IgA, CMP in the next month  - Based on LFTs can consider further work up  - Ok to resume statin if needed from liver standpoint - typically recommend holding if AST/ALT rise to 3x ULN on treatment    RTC 4 months.    Alie Pinto MD [facility]

## 2020-11-20 NOTE — PROGRESS NOTES
"Chief Complaint   Patient presents with     RECHECK     LAB RESULTS   Height 1.778 m (5' 10\"), weight 77.1 kg (170 lb).    Eric Espinosa is a 65 year old male who is being evaluated via a billable video visit.      The patient has been notified of following:     \"This video visit will be conducted via a call between you and your physician/provider. We have found that certain health care needs can be provided without the need for an in-person physical exam.  This service lets us provide the care you need with a video conversation.  If a prescription is necessary we can send it directly to your pharmacy.  If lab work is needed we can place an order for that and you can then stop by our lab to have the test done at a later time.    Video visits are billed at different rates depending on your insurance coverage.  Please reach out to your insurance provider with any questions.    If during the course of the call the physician/provider feels a video visit is not appropriate, you will not be charged for this service.\"    Patient has given verbal consent for Video visit? Yes  How would you like to obtain your AVS? MyChart  If you are dropped from the video visit, the video invite should be resent to: Other e-mail: USE MEC DynamicsITY- [facility]   Will anyone else be joining your video visit? No  {If patient encounters technical issues they should call [phone] :[id number]}      Video-Visit Details    Type of service:  Video Visit    Video Start Time: {video visit start/end time:152948}  Video End Time: {video visit start/end time:152948}    Originating Location (pt. Location): {video visit patient location:863812::\"Home\"}    Distant Location (provider location):  [facility]     Platform used for Video Visit: {Virtual Visit Platforms:692997::\"[email or web address]\"}    {signature options:656462}          "

## 2020-12-09 ENCOUNTER — TELEPHONE (OUTPATIENT)
Dept: GASTROENTEROLOGY | Facility: CLINIC | Age: 65
End: 2020-12-09

## 2020-12-14 ENCOUNTER — TELEPHONE (OUTPATIENT)
Dept: OTOLARYNGOLOGY | Facility: CLINIC | Age: 65
End: 2020-12-14

## 2020-12-14 ENCOUNTER — HEALTH MAINTENANCE LETTER (OUTPATIENT)
Age: 65
End: 2020-12-14

## 2021-01-04 ENCOUNTER — MYC MEDICAL ADVICE (OUTPATIENT)
Dept: INTERNAL MEDICINE | Facility: CLINIC | Age: 66
End: 2021-01-04

## 2021-01-04 DIAGNOSIS — E78.00 HIGH BLOOD CHOLESTEROL: Primary | ICD-10-CM

## 2021-01-05 ENCOUNTER — MYC MEDICAL ADVICE (OUTPATIENT)
Dept: INTERNAL MEDICINE | Facility: CLINIC | Age: 66
End: 2021-01-05

## 2021-01-05 DIAGNOSIS — R53.83 FATIGUE, UNSPECIFIED TYPE: Primary | ICD-10-CM

## 2021-01-07 ENCOUNTER — TELEPHONE (OUTPATIENT)
Dept: ALLERGY | Facility: CLINIC | Age: 66
End: 2021-01-07

## 2021-01-07 NOTE — TELEPHONE ENCOUNTER
FUTURE VISIT INFORMATION      FUTURE VISIT INFORMATION:    Date: 1.12.21    Time: 1:00    [place] [facility] [place]  REFERRAL INFORMATION:    Referring provider: Dr. Kirstin Fournier    Referring providers clinic:  [facility]    Reason for visit/diagnosis  New allergy consult     RECORDS REQUESTED FROM:       Clinic name Comments Records Status Photos Status   [facility] 1.5.21, 11.10.20  Dr. Fournier [facility]

## 2021-01-12 ENCOUNTER — VIRTUAL VISIT (OUTPATIENT)
Dept: ALLERGY | Facility: CLINIC | Age: 66
End: 2021-01-12
Attending: DERMATOLOGY
Payer: COMMERCIAL

## 2021-01-12 ENCOUNTER — TELEPHONE (OUTPATIENT)
Dept: DERMATOLOGY | Facility: CLINIC | Age: 66
End: 2021-01-12

## 2021-01-12 ENCOUNTER — PRE VISIT (OUTPATIENT)
Dept: ALLERGY | Facility: CLINIC | Age: 66
End: 2021-01-12

## 2021-01-12 DIAGNOSIS — N48.1 BALANITIS: ICD-10-CM

## 2021-01-12 DIAGNOSIS — Z88.9 ATOPY: ICD-10-CM

## 2021-01-12 DIAGNOSIS — J45.991 COUGH VARIANT ASTHMA: ICD-10-CM

## 2021-01-12 DIAGNOSIS — B88.0: ICD-10-CM

## 2021-01-12 DIAGNOSIS — L23.89 ALLERGIC CONTACT DERMATITIS DUE TO OTHER AGENTS: Primary | ICD-10-CM

## 2021-01-12 PROCEDURE — 99214 OFFICE O/P EST MOD 30 MIN: CPT | Mod: 95 | Performed by: DERMATOLOGY

## 2021-01-12 NOTE — PROGRESS NOTES
[facility] Dermato-Allergy Record     Allergy Problem List:    Specialty Problems        Allergy Diagnoses    Psoriasis        Dermatitis, seborrheic              CC:   Allergy Consult (Elroy is here for an allergy consult relating to )        Encounter Date: Jan 12, 2021    History of Present Illness:  I have reviewed the teledermatology  information and the nursing intake corresponding to this issue. Eric Espinosa is a 65 year old male who presents as a teledermatology consult for the following information take directly from the nursing note (kept in this note for patient safety) and phone call with photos in Epic from today performed by myself:     Patient has been on several ENTs (Dr. Cote)with constant sore throat and slight ethmoid sinusitis, no acid reflux --> started about 10 months ago  In addition patient had Balanitis and dermatitis on scalp and trunk/shoulders. On the shoulders based on pictures from September 2020 look like localized seropapules = maybe ectoparasitosis = Chiggers?    >> patient started about 10 years ago Atorvastatin and did stop it in April.       Patient was patient of Dr. Fournier in November 2021:     1. History of NMSC  -BCC, nose, s/p Mohs surgery 2015   -BCC, L superior shoulder ([facility] 2013)  -BCC, forehead (2012 w/ Dr. Fernández in [place], [place]).   2. Actinic keratoses, forehead s/p cryo   3. Hemangioma L shoudler s/p excision 4/26/2016 (suspected lymph node)  4. Benign Bx  - IDN, central forehead, s/p shave bx 11/1/19  5 Seborrheic dermatitis  - patch on left temporal scalp with predominant scale  - Current tx: Alternate Selsan blue and head & shoulders for shampooing, lidex solution as needed (discussed 10/19/18)  6. Balanitis  - Current Tx: hydrocortisone 2.5% ointment BID PRN      Past Medical History:   Patient Active Problem List   Diagnosis     Psoriasis     Neoplasm of uncertain behavior of skin     AK (actinic keratosis)     BCC (basal cell carcinoma), trunk      Actinic keratosis     History of skin cancer     Diverticulitis     History of basal cell cancer     Dermal nevus     Dermatitis, seborrheic     Past Medical History:   Diagnosis Date     Arthritis      Autoimmune disease (H)      Basal cell carcinoma      Chronic tonsillitis      Hoarseness      Skin cancer        Allergy History:     Allergies   Allergen Reactions     Ciprofloxacin Rash       Social History:  The patient is retired. Patient has the following hobbies or non-occupational exposure      reports that he has never smoked. He has never used smokeless tobacco. He reports current alcohol use. He reports that he does not use drugs.      Family History:  Family History   Problem Relation Age of Onset     Diabetes Father      Heart Disease Father      Hypertension Mother      Cancer No family hx of         No family history of skin cancer     Skin Cancer No family hx of      Glaucoma No family hx of      Macular Degeneration No family hx of        Medications:  Current Outpatient Medications   Medication Sig Dispense Refill     ARTIFICIAL TEAR OP Apply 1 drop to eye as needed       atorvastatin (LIPITOR) 20 MG tablet Take 1 tablet (20 mg) by mouth daily (Patient not taking: Reported on 10/10/2020) 90 tablet 3     fluocinonide (LIDEX) 0.05 % solution Apply topically 2 times daily (Patient not taking: Reported on 10/10/2020) 60 mL 1     hydrocortisone 2.5 % ointment Apply topically 2 times daily To inflammation on penis 30 g 11     ibuprofen (ADVIL,MOTRIN) 200 MG tablet Take 200 mg by mouth every 4 hours as needed       MELATONIN PO Take 5 mg by mouth At Bedtime        tamsulosin (FLOMAX) 0.4 MG capsule Take 1 capsule (0.4 mg) by mouth daily 30 capsule 3     zaleplon (SONATA) 5 MG capsule Take 1 capsule (5 mg) by mouth nightly as needed for sleep 15 capsule 0     zolpidem (AMBIEN) 5 MG tablet Take 1 tablet (5 mg) by mouth nightly as needed for sleep (Patient not taking: Reported on 10/10/2020) 30 tablet 1          Additional comments and observations from review of the patient s chart including the following:    See notes    ROS: Patient generally feeling well today   Physical Examination:  General: Well-appearing, appropriately-developed individual.  - Skin: Examination of the oral mucosa and also lesions on extremities and scalp within the teledermatology was performed.   In oral mucosa erythema in pharynx and on upper and interior part of tongue some leucoplakia. On the trunk papulo-vesiculous lesions, some areas on extremities and trunk with eczematous aspects  As above in HPI. No additional skin concerns.  - upper respiratory tract: currently no obvious Rhinitis  - lungs: no signs for active and present Asthma/Wheezing or coughing  - eyes: currently no active conjunctivits  - GI system/digestion: currently no problems, no bloating or Diarrhoea    Allergy tests:    Past Allergy tests      Current Allergy tests (or planned)      Order for PATCH TESTS    [x] Outpatient  [] Inpatient: [facility]..../ Bed ....      Skin Atopy (atopic dermatitis) [] Yes   [x] No  Rhinitis/Sinusitis:   [x] Yes   [] No  Allergic Asthma:   [] Yes   [x] No  Food Allergy:   [] Yes   [x] No  Leg ulcers:   [] Yes   [x] No  Hand eczema:   [] Yes   [x] No   Leading hand:   [] R   [] L       [] Ambidextrous                        Reason for tests (suspected allergy): recurrent dermatitis on scalp and glans penis and maybe oral cavity  Known previous allergies: none    Standardized panels  [x] Standard panel (40 tests)  [x] Preservatives & Antimicrobials (31 tests)  [x] Emulsifiers & Additives (25 tests)   [x] Perfumes/Flavours & Plants (25 tests)  [] Hairdresser panel (12 tests)  [] Rubber Chemicals (22 tests)  [] Plastics (26 tests)  [x] Colorants/Dyes/Food additives (20 tests)  [] Metals (implants/dental) (24 tests)  [] Local anaesthetics/NSAIDs (14 tests)  [x] Antibiotics & Antimycotics (14 tests)   [] Corticosteroids (15 tests)   [] Photopatch  test (62 tests)   [] others: ...      [] Patient's own products: ...    DO NOT test if chemical or biological identity is unknown!     always ask from patient the product information and safety sheets (MSDS)     Order for PRICK TESTS    [x] Outpatient  [] Inpatient: [facility]..../ Bed ....      Skin Atopy (atopic dermatitis) [] Yes   [x] No  Contact allergies:   [x] Yes   [] No  Urticaria/Angioedema  [] Yes   [x] No  Rhinitis/Sinusitis:   [] Yes   [x] No recently some recurrent Sinusitis with yellow exudate   [] seasonal [] perennial            Allergic Asthma:   [] Yes   [x] No  Pets :  [x] Yes   [] No  Which? Had dogs for 20 years (last one  2 weeks ago)  Food Allergy:   [x] Yes   [x] No  Drug allergies:    X Yes   [] No Ciprofloxacine rash on chest    Reason for tests (suspected allergy): chronic pharyngitis with recurrent Sinusitis  Known previous allergies: none    Standardized prick panels  [x] Atopic panel (20 tests)  [] Pediatric Panel (12 tests)  [] Milk, Meat, Eggs, Grains (20 tests)   [] Dust, Epithelia, Feathers (10 tests)  [] Fish, Seafood, Shellfish (17 tests)  [] Nuts, Beans (14 tests)  [] Spice, Vegetable, Fruit (17 tests)  [] Others: ...      [] Patient's own products: ...    DO NOT test if chemical or biological identity is unknown!     always ask from patient the product information and safety sheets (MSDS)     Standardized intradermal tests  [x] Penicillium notatum [x] Aspergillus fumigatus [x] House dust mites  [] Bee venom   [] Wasp venom !!Specific protocol with dilutions!!  [] Others: ...    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     Staff: Joselin Ro LPN and Alycia Pizarro RN    _____________________________________________________________________________    Teledermatology information:  - Location of patient: Home  - Patient presented in referral from: other  - Location of teledermatologist:  [facility] [place] (, [place], [place])  - Reason teledermatology is appropriate:  of National Emergency Regarding Coronavirus disease (COVID 19) Outbreak  - Method of transmission:  Store and Forward and Video ( Invitation sent by:  [employer] and text to cell phone )  - Date of images: during video  - Service start time:12:44pm  - Service end time: 1:15pm  - Date of report: 01/12/21      I spent a total of 31 minutes for telemedicine consult with Eric Espinosa during today s phone with pictures in Epic meeting. Over 50% of this time was spent counseling the patient and/or coordinating care. Please see Assessment and Plan for details.

## 2021-01-12 NOTE — NURSING NOTE
Allergy Rooming Note    Eric Espinosa's goals for this visit include:   Chief Complaint   Patient presents with     Allergy Consult     Elroy is here for an allergy consult relating to      Joselin Ro LPN

## 2021-01-12 NOTE — LETTER
1/12/2021         RE: Eric Espinosa  [address]   [facility] [address]        Dear Colleague,    Thank you for referring your patient, Eric Espinosa, to the [facility] [place]. Please see a copy of my visit note below.    Eric is a 65 year old who is being evaluated via a billable video visit.      How would you like to obtain your AVS? MyChart  If the video visit is dropped, the invitation should be resent by: Text to cell phone: [phone]  Will anyone else be joining your video visit? No    HPI   Review of Systems   {Physical Exam     Video-Visit Details    Type of service:  Video Visit    Originating Location (pt. Location): Home    Distant Location (provider location):  [facility] [place]     Platform used for Video Visit: [facility] Dermato-Allergy Record     Allergy Problem List:    Specialty Problems        Allergy Diagnoses    Psoriasis        Dermatitis, seborrheic              CC:   Allergy Consult (Elroy is here for an allergy consult relating to )        Encounter Date: Jan 12, 2021    History of Present Illness:  I have reviewed the teledermatology  information and the nursing intake corresponding to this issue. Eric Espinosa is a 65 year old male who presents as a teledermatology consult for the following information take directly from the nursing note (kept in this note for patient safety) and phone call with photos in Epic from today performed by myself:     Patient has been on several ENTs (Dr. Cote)with constant sore throat and slight ethmoid sinusitis, no acid reflux --> started about 10 months ago  In addition patient had Balanitis and dermatitis on scalp and trunk/shoulders. On the shoulders based on pictures from September 2020 look like localized seropapules = maybe ectoparasitosis = Chiggers?    >> patient started about 10 years ago Atorvastatin and did stop it in April.       Patient was patient of   [facility] in November 2021:     1. History of NMSC  -BCC, nose, s/p Mohs surgery 2015   -BCC, L superior shoulder ([facility] 2013)  -BCC, forehead (2012 w/ Dr. Fernández in [place], [place]).   2. Actinic keratoses, forehead s/p cryo   3. Hemangioma L shoudler s/p excision 4/26/2016 (suspected lymph node)  4. Benign Bx  - IDN, central forehead, s/p shave bx 11/1/19  5 Seborrheic dermatitis  - patch on left temporal scalp with predominant scale  - Current tx: Alternate Selsan blue and head & shoulders for shampooing, lidex solution as needed (discussed 10/19/18)  6. Balanitis  - Current Tx: hydrocortisone 2.5% ointment BID PRN      Past Medical History:   Patient Active Problem List   Diagnosis     Psoriasis     Neoplasm of uncertain behavior of skin     AK (actinic keratosis)     BCC (basal cell carcinoma), trunk     Actinic keratosis     History of skin cancer     Diverticulitis     History of basal cell cancer     Dermal nevus     Dermatitis, seborrheic     Past Medical History:   Diagnosis Date     Arthritis      Autoimmune disease (H)      Basal cell carcinoma      Chronic tonsillitis      Hoarseness      Skin cancer        Allergy History:     Allergies   Allergen Reactions     Ciprofloxacin Rash       Social History:  The patient is retired. Patient has the following hobbies or non-occupational exposure      reports that he has never smoked. He has never used smokeless tobacco. He reports current alcohol use. He reports that he does not use drugs.      Family History:  Family History   Problem Relation Age of Onset     Diabetes Father      Heart Disease Father      Hypertension Mother      Cancer No family hx of         No family history of skin cancer     Skin Cancer No family hx of      Glaucoma No family hx of      Macular Degeneration No family hx of        Medications:  Current Outpatient Medications   Medication Sig Dispense Refill     ARTIFICIAL TEAR OP Apply 1 drop to eye as needed       atorvastatin  (LIPITOR) 20 MG tablet Take 1 tablet (20 mg) by mouth daily (Patient not taking: Reported on 10/10/2020) 90 tablet 3     fluocinonide (LIDEX) 0.05 % solution Apply topically 2 times daily (Patient not taking: Reported on 10/10/2020) 60 mL 1     hydrocortisone 2.5 % ointment Apply topically 2 times daily To inflammation on penis 30 g 11     ibuprofen (ADVIL,MOTRIN) 200 MG tablet Take 200 mg by mouth every 4 hours as needed       MELATONIN PO Take 5 mg by mouth At Bedtime        tamsulosin (FLOMAX) 0.4 MG capsule Take 1 capsule (0.4 mg) by mouth daily 30 capsule 3     zaleplon (SONATA) 5 MG capsule Take 1 capsule (5 mg) by mouth nightly as needed for sleep 15 capsule 0     zolpidem (AMBIEN) 5 MG tablet Take 1 tablet (5 mg) by mouth nightly as needed for sleep (Patient not taking: Reported on 10/10/2020) 30 tablet 1         Additional comments and observations from review of the patient s chart including the following:    See notes    ROS: Patient generally feeling well today   Physical Examination:  General: Well-appearing, appropriately-developed individual.  - Skin: Examination of the oral mucosa and also lesions on extremities and scalp within the teledermatology was performed.   In oral mucosa erythema in pharynx and on upper and interior part of tongue some leucoplakia. On the trunk papulo-vesiculous lesions, some areas on extremities and trunk with eczematous aspects  As above in HPI. No additional skin concerns.  - upper respiratory tract: currently no obvious Rhinitis  - lungs: no signs for active and present Asthma/Wheezing or coughing  - eyes: currently no active conjunctivits  - GI system/digestion: currently no problems, no bloating or Diarrhoea    Allergy tests:    Past Allergy tests      Current Allergy tests (or planned)      Order for PATCH TESTS    [x] Outpatient  [] Inpatient: [facility]..../ Bed ....      Skin Atopy (atopic dermatitis) [] Yes   [x] No  Rhinitis/Sinusitis:   [x] Yes   [] No  Allergic  Asthma:   [] Yes   [x] No  Food Allergy:   [] Yes   [x] No  Leg ulcers:   [] Yes   [x] No  Hand eczema:   [] Yes   [x] No   Leading hand:   [] R   [] L       [] Ambidextrous                        Reason for tests (suspected allergy): recurrent dermatitis on scalp and glans penis and maybe oral cavity  Known previous allergies: none    Standardized panels  [x] Standard panel (40 tests)  [x] Preservatives & Antimicrobials (31 tests)  [x] Emulsifiers & Additives (25 tests)   [x] Perfumes/Flavours & Plants (25 tests)  [] Hairdresser panel (12 tests)  [] Rubber Chemicals (22 tests)  [] Plastics (26 tests)  [x] Colorants/Dyes/Food additives (20 tests)  [] Metals (implants/dental) (24 tests)  [] Local anaesthetics/NSAIDs (14 tests)  [x] Antibiotics & Antimycotics (14 tests)   [] Corticosteroids (15 tests)   [] Photopatch test (62 tests)   [] others: ...      [] Patient's own products: ...    DO NOT test if chemical or biological identity is unknown!     always ask from patient the product information and safety sheets (MSDS)     Order for PRICK TESTS    [x] Outpatient  [] Inpatient: [facility]..../ Bed ....      Skin Atopy (atopic dermatitis) [] Yes   [x] No  Contact allergies:   [x] Yes   [] No  Urticaria/Angioedema  [] Yes   [x] No  Rhinitis/Sinusitis:   [] Yes   [x] No recently some recurrent Sinusitis with yellow exudate   [] seasonal [] perennial            Allergic Asthma:   [] Yes   [x] No  Pets :  [x] Yes   [] No  Which? Had dogs for 20 years (last one  2 weeks ago)  Food Allergy:   [x] Yes   [x] No  Drug allergies:    X Yes   [] No Ciprofloxacine rash on chest    Reason for tests (suspected allergy): chronic pharyngitis with recurrent Sinusitis  Known previous allergies: none    Standardized prick panels  [x] Atopic panel (20 tests)  [] Pediatric Panel (12 tests)  [] Milk, Meat, Eggs, Grains (20 tests)   [] Dust, Epithelia, Feathers (10 tests)  [] Fish, Seafood, Shellfish (17 tests)  [] Nuts, Beans (14 tests)  []  Spice, Vegetable, Fruit (17 tests)  [] Others: ...      [] Patient's own products: ...    DO NOT test if chemical or biological identity is unknown!     always ask from patient the product information and safety sheets (MSDS)     Standardized intradermal tests  [x] Penicillium notatum [x] Aspergillus fumigatus [x] House dust mites  [] Bee venom   [] Wasp venom !!Specific protocol with dilutions!!  [] Others: ...    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     Staff: Joselin Ro LPN and Alycia Pizarro RN    _____________________________________________________________________________    Teledermatology information:  - Location of patient: Home  - Patient presented in referral from: other  - Location of teledermatologist:  [facility] [place] (, [place], [place])  - Reason teledermatology is appropriate:  of National Emergency Regarding Coronavirus disease (COVID 19) Outbreak  - Method of transmission:  Store and Forward and Video ( Invitation sent by:  [employer] and text to cell phone )  - Date of images: during video  - Service start time:12:44pm  - Service end time: 1:15pm  - Date of report: 01/12/21      I spent a total of 31  minutes for telemedicine consult with Eric Espinosa during today s phone with pictures in Epic meeting. Over 50% of this time was spent counseling the patient and/or coordinating care. Please see Assessment and Plan for details.       Again, thank you for allowing me to participate in the care of your patient.        Sincerely,        Rob Zendejas MD

## 2021-01-14 NOTE — TELEPHONE ENCOUNTER
Standardized panels  [x]? Standard panel (40 tests)  [x]? Preservatives & Antimicrobials (31 tests)  [x]? Emulsifiers & Additives (25 tests)   [x]? Perfumes/Flavours & Plants (25 tests)  []? Hairdresser panel (12 tests)  []? Rubber Chemicals (22 tests)  []? Plastics (26 tests)  [x]? Colorants/Dyes/Food additives (20 tests)  []? Metals (implants/dental) (24 tests)  []? Local anaesthetics/NSAIDs (14 tests)  [x]? Antibiotics & Antimycotics (14 tests)   []? Corticosteroids (15 tests)   []? Photopatch test (62 tests)   []? others: ...                         []? Patient's own products: ...    DO NOT test if chemical or biological identity is unknown!     always ask from patient the product information and safety sheets (MSDS)      Order for PRICK TESTS     [x]? Outpatient             []? Inpatient: Xiao..../ Bed ....                           Skin Atopy (atopic dermatitis)        []? Yes   [x]? No  Contact allergies:                              [x]? Yes   []? No  Urticaria/Angioedema                      []? Yes   [x]? No  Rhinitis/Sinusitis:                              []? Yes   [x]? No recently some recurrent Sinusitis with yellow exudate                                 []? seasonal []? perennial            Allergic Asthma:                                []? Yes   [x]? No  Pets :                                                  [x]? Yes   []? No  Which? Had dogs for 20 years (last one  2 weeks ago)  Food Allergy:                                     [x]? Yes   [x]? No  Drug allergies:                                                                                                 X Yes   []? No Ciprofloxacine rash on chest     Reason for tests (suspected allergy): chronic pharyngitis with recurrent Sinusitis  Known previous allergies: none     Standardized prick panels  [x]? Atopic panel (20 tests)  []? Pediatric Panel (12 tests)  []? Milk, Meat, Eggs, Grains (20 tests)   []? Dust, Epithelia, Feathers (10  tests)  []? Fish, Seafood, Shellfish (17 tests)  []? Nuts, Beans (14 tests)  []? Spice, Vegetable, Fruit (17 tests)  []? Others: ...                         []? Patient's own products: ...    DO NOT test if chemical or biological identity is unknown!     always ask from patient the product information and safety sheets (MSDS)      Standardized intradermal tests  [x]? Penicillium notatum           [x]? Aspergillus fumigatus        [x]? House dust mites  []? Bee venom                         []? Wasp venom          !!Specific protocol with dilutions!!  []? Others: ...     []? Patient needs consultation with Allergy team (changes of tests may apply)  [x]? 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               DDx chiggers (Trombidiosis)        Patient counseling: Stop all antihistamines 1 week before tests     Follow-up: in Derm-Allergy clinic for prick and patch tests        STANDARD Series      No Substance 2 days 4 days remarks   1 Sumit Mix [C] - -    2 Colophony - -    3  2-Mercaptobenzothiazole  - -     4 Methylisothiazolinone - -    5 Carba Mix - -    6 Thiuram Mix [A] - -    7 Bisphenol A Epoxy Resin - -    8 E-Rkkm-Jssfabbgqjb-Formaldehyde Resin - -    9 Mercapto Mix [A] - -    10 Black Rubber Mix- PPD [B] - -    11 Potassium Dichromate  -  -    12 Balsam of [place] (Myroxylon Pereirae Resin) - -    13 Nickel Sulphate Hexahydrate - -    14 Mixed Dialkyl Thiourea - -    15 Paraben Mix [B] - -    16 Methyldibromo Glutaronitrile - -    17 Fragrance  Mix - -    18 2-Bromo-2-Nitropropane-1,3-Diol (Bronopol) - -    19 Lyral - -    20 Tixocortol-21- Pivalate - -    21 Diazolidiyl Urea (Germall II) - -    22 Methyl Methacrylate - -    23 Cobalt (II) Chloride Hexahydrate - -    24 Fragrance Mix II  - -    25 Compositae Mix - -    26 Benzoyl Peroxide - -    27 Bacitracin - -    28 Formaldehyde - -    29 Methylchloroisothiazolinone / Methylisothiazolinone - -    30 Corticosteroid Mix - -    31 Sodium Lauryl Sulfate - -    32 Lanolin Alcohol - -    33 Turpentine - -    34 Cetylstearylalcohol - -    35 Chlorhexidine Dicluconate - -    36 Budenoside - -    37 Imidazolidinyl Urea  - -    38 Ethyl-2 Cyanoacrylate - -    39 Quaternium 15 (Dowicil 200) - -    40 Decyl Glucoside - -      COLORANTS, DYES, FOOD ADDITIVES   No Substance 2 days 4 days remarks   41 1 Aniline - -    42 2 Keyshawn Brown R - -    43 3 Textile Dye Mix [A] - -    44 4 Hearne  - -    45 5 Disperse Blue-3 - -    46 6 Disperse [place]-3 - -    47 7 Disperse Red-11 - -    48 8 Disperse Yellow-9 - -    49 9 Erythrosine-B - -    50 10 Naphthol AS - -       Food additives      51 11 Aspartame - -    52 12 Azorubine - -    53 13 Brilliant Black - -    54 14 Cochineal Red - -    55 15 Patent Blue-VF - -    56 16 Pectin - -    57 17 Quinoline Yellow - -    58 18 Saccharin - -    59 19 Tartrazine - -    60 20 Sodium Glutamate - -      EMULSIFIERS & ADDITIVES   No Substance 2 days 4 days remarks   61 1 Polyethylene Glycol-400 - -    62 2 Cocamidopropyl Betaine - -    63 3 Amerchol L101 - -    64 4 Propylene Glycol - -    65 5 Triethanolamine - -    66 6 Sorbitane Sesquiolate - -    67 7 Isopropylmyristate - -    68 8 Polysorbate 80  - -    69 9 Amidoamine   (Stearamidopropyl Dimethylamine) - -    70 10 Oleamidopropyl Dimethylamine - -    71 11 Lauryl Glucoside - -    72 12 Coconut Diethanolamide  - -    73 13 2-Hydroxy-4-Methoxy Benzophenone (Oxybenzone) - -    74 14 Benzophenone-4 (Sulisobenzon) - -    75 15  Propolis - -    76 16 Dexpanthenol - -    77 17 Carboxymethyl Cellulose Sodium - -    78 18 Abitol - -    79 19 Tert-Butylhydroquinone - -    80 20 Benzyl Salicylate - -      Antioxidant      81 21 Dodecyl Gallate - -    82 22 Butylhydroxyanisole (BHA) - -    83 23 Butylhydroxytoluene (BHT) - -    84 24 Di-Alpha-Tocopherol (Vit E) - -    85 25 Propyl Gallate - -    86 26 Zinc Pyrithione      87 27 Dimethylaminopropylamin (DMAPA)        PRESERVATIVES & ANTIMICROBIALS     No Substance 2 days 4 days remarks   88 1  1,2-Benzisothiazoline-3-One, Sodium Salt - -    89 2  1,3,5-Tano (2-Hydroxyethyl) - Hexahydrotriazine (Grotan BK) - -    90 3 4-Xgpyjzsyklhcb-8-Nitro-1, 3-Propanediol - -    91 4  3, 4, 4' - Triclocarban - -    92 5 4 - Chloro - 3 - Cresol - -    93 6 4 - Chloro - 4 - Xylenol (PCMX) - -    94 7 7-Ethylbicyclooxazolidine (Bioban LD3793) - -    95 8 Benzalkonium Chloride - -    96 9 Benzyl Alcohol - -    97 10 Cetalkonium Chloride - -    98 11 Cetylpyrimidine Chloride  - -    99 12 Chloroacetamide - -    100 13 DMDM Hydantoin - -    101 14 Glutaraldehyde - -    102 15 Triclosan - -    103 16 Glyoxal Trimeric Dihydrate - -    104 17 Iodopropynyl Butylcarbamate - -    105 18 Octylisothiazoline - -    106 19 Iodoform - -    107 20 (Nitrobutyl) Morpholine/(Ethylnitro-Trimethylene) Dimorpholine (Bioban P 1487) - -    108 21 Phenoxyethanol - -    109 22 Phenyl Salicylate - -    110 23 Povidone Iodine - -    111 24 Sodium Benzoate - -    112 25 Sodium Disulfite - -    113 26 Sorbic Acid - -    114 27 Thimerosal - -    115 28 Melamine Formeladyde Resin      116 29 Ethylenediamine Dihydrochloride        Parabens      117 30 Butyl-P-Hydroxybenzoate - -    118 31 Ethyl-P-Hydroxybenzoate - -    119 32 Methyl-P-Hydroxybenzoate - -    120 33 Propyl-P-Hydroxybenzoate - -      PERFUMES, FLAVORS & PLANTS      No Substance 2 days 4 days remarks   121 1 Benzyl Cinnamate - -    122 2 Di-Limonene (Dipentene) - -    123 3 Cananga  Odorata (Niki Prince) (I) - -    124 4 Lichen Acid Mix - -    125 5 Mentha Piperita Oil (Peppermint Oil) - -    126 6 Sesquiterpenelactone mix - -    127 7 Tea Tree Oil, Oxidized - -    128 8 Wood Tar Mix - -    129 9 Abietic Acid - -    130 10 Lavendula Angustifolia Oil (Lavender Oil) - -    131 11 Camphor  - -      Fragrance Mix I      132 12 Oakmoss Absolute - -    133 13 Eugenol - -    134 14 Geraniol - -    135 15 Hydroxycitronellal - -    136 16 Isoeugenol - -    137 17 Cinnamic Aldehyde - -    138 18 Cinnamic Alcohol  - -      Fragrance mix II      139 19 Citronellol - -    140 20 Alpha-Hexylcinnamic Aldehyde    - -    141 21 Citral - -    142 22 Farnesol - -    143 23 Coumarin - -      ANTIBIOTICS & ANTIMYCOTICS    No Substance 2 days 4 days remarks   144 1 Erythromycine - -    145 2 Framycetine Sulphate - -    146 3 Fusidic Acid Sodium Salt - -    147 4 Gentamicin Sulphate - -    148 5 Neomycine Sulphate - -    149 6 Oxytetracycline  - -    150 7 Polymyxin B Sulphate - -    151 8 Tetracycline-HCL - -    152 9 Sulfanilamide - -    153 10 Metronidazole - -    154 11 Oxyquinoline Mix - -    155 12 Nitrofurazone - -    156 13 Nystatin - -    157 14 Clotrimazole - -      OTHER PRODUCTS      No Substance Conc  % solv 2 days 4 days remarks    1          2          3          4          5          6          7          8          9          10           Results of patch tests:                         Interpretation:    - Negative                    A    = Allergic      (+) Erythema    TI   = Toxic/irritant   + E + Infiltration    RaP = Relevance at Present     ++ E/I + Papulovesicle   Rpr  = Relevance Previously     +++ E/I/P + Blister     nR   = No Relevance        Atopy Screen   No Substance Readings (15 min) Evaluation   POS Histamine 1mg/ml -    NEG NaCl 0.9% -      No Substance Readings (15 min) Evaluation   1 Alternaria alternata (tenuis)  -    2 Cladosporium herbarum -    3 Aspergillus fumigatus -    4  Penicillium notatum -    5 Dermatophagoides pteronyssinus -    6 Dermatophagoides farinae -    7 Dog epithelium (canis spp) -    8 Cat hair (crow catus) -    9 Cockroach   (Blatella americana & germanica) -    10 Grass mix midwest   ([place], Orchard, [place], Eber) -    11 Alonso grass (sorghum halepense) -    12 Weed mix   (common Cocklebur, Lamb s quarters, rough redroot Pigweed, Dock/Sorrel) -    13 Mug wort (artemisia vulgare) -    14 Ragweed giant/short (ambrosia spp) -    15 English Plantain (plantago lanceolata) -    16 Tree mix 1 (Pecan, Maple BHR, Oak RVW, american [place], black Frankewing) -    17 Red cedar (juniperus virginia) -    18 Tree mix 2   (white Joselito, river/red Birch, black [place], common [place], american Elm) -    19 Box elder/Maple mix (acer spp) -    20 [place] shagbark (carya ovata) -       -      Conclusion:        Intradermal Testing   No Substance Conc.  Readings (15min) Evaluation   - NaCl  0.9% -    + Histamine (prick) 0.1mg / ml -    DF Standard Dust Mite - D. Farinae 1:10 -    DP Standard Dust Mite - D. Pteronyssinus 1:10 -    A Aspergillus fumigatus  1:10 -    P Penicillium notatum 1:10 -      Conclusion:

## 2021-01-22 DIAGNOSIS — R53.83 FATIGUE, UNSPECIFIED TYPE: ICD-10-CM

## 2021-01-22 DIAGNOSIS — E78.00 HIGH BLOOD CHOLESTEROL: ICD-10-CM

## 2021-01-22 LAB
CHOLEST SERPL-MCNC: 198 MG/DL
HDLC SERPL-MCNC: 39 MG/DL
LDLC SERPL CALC-MCNC: 138 MG/DL
NONHDLC SERPL-MCNC: 160 MG/DL
TRIGL SERPL-MCNC: 109 MG/DL

## 2021-01-22 PROCEDURE — 84403 ASSAY OF TOTAL TESTOSTERONE: CPT | Mod: 90 | Performed by: PATHOLOGY

## 2021-01-22 PROCEDURE — 80061 LIPID PANEL: CPT | Performed by: PATHOLOGY

## 2021-01-22 PROCEDURE — 84270 ASSAY OF SEX HORMONE GLOBUL: CPT | Mod: 90 | Performed by: PATHOLOGY

## 2021-01-22 PROCEDURE — 99000 SPECIMEN HANDLING OFFICE-LAB: CPT | Performed by: PATHOLOGY

## 2021-01-22 PROCEDURE — 36415 COLL VENOUS BLD VENIPUNCTURE: CPT | Performed by: PATHOLOGY

## 2021-01-27 LAB
SHBG SERPL-SCNC: 37 NMOL/L (ref 11–80)
TESTOST FREE SERPL-MCNC: 11.03 NG/DL (ref 4.7–24.4)
TESTOST SERPL-MCNC: 554 NG/DL (ref 240–950)

## 2021-01-29 ENCOUNTER — PRE VISIT (OUTPATIENT)
Dept: UROLOGY | Facility: CLINIC | Age: 66
End: 2021-01-29

## 2021-01-29 NOTE — TELEPHONE ENCOUNTER
Chief Complaint : Follow up - 3 Months Sx check    Hx/Sx: Mixed LUTS (Urinary frequency, urgency, nocturia, hesitancy, weak stream)    Records/Orders: Available    Pt Contacted: N/a    At Rooming: Alexander Rubalcava EMT

## 2021-02-04 DIAGNOSIS — R74.8 ELEVATED LIVER ENZYMES: ICD-10-CM

## 2021-02-04 LAB
ALBUMIN SERPL-MCNC: 4 G/DL (ref 3.4–5)
ALP SERPL-CCNC: 106 U/L (ref 40–150)
ALT SERPL W P-5'-P-CCNC: 65 U/L (ref 0–70)
ANION GAP SERPL CALCULATED.3IONS-SCNC: 4 MMOL/L (ref 3–14)
AST SERPL W P-5'-P-CCNC: 27 U/L (ref 0–45)
BILIRUB SERPL-MCNC: 0.4 MG/DL (ref 0.2–1.3)
BUN SERPL-MCNC: 18 MG/DL (ref 7–30)
CALCIUM SERPL-MCNC: 8.9 MG/DL (ref 8.5–10.1)
CHLORIDE SERPL-SCNC: 106 MMOL/L (ref 94–109)
CO2 SERPL-SCNC: 30 MMOL/L (ref 20–32)
CREAT SERPL-MCNC: 0.74 MG/DL (ref 0.66–1.25)
GFR SERPL CREATININE-BSD FRML MDRD: >90 ML/MIN/{1.73_M2}
GLUCOSE SERPL-MCNC: 100 MG/DL (ref 70–99)
POTASSIUM SERPL-SCNC: 4.2 MMOL/L (ref 3.4–5.3)
PROT SERPL-MCNC: 7.5 G/DL (ref 6.8–8.8)
SODIUM SERPL-SCNC: 141 MMOL/L (ref 133–144)

## 2021-02-04 PROCEDURE — 80053 COMPREHEN METABOLIC PANEL: CPT | Performed by: PATHOLOGY

## 2021-02-04 PROCEDURE — 36415 COLL VENOUS BLD VENIPUNCTURE: CPT | Performed by: PATHOLOGY

## 2021-02-04 PROCEDURE — 83516 IMMUNOASSAY NONANTIBODY: CPT | Mod: 90 | Performed by: PATHOLOGY

## 2021-02-05 LAB
TTG IGA SER-ACNC: 1 U/ML
TTG IGG SER-ACNC: <1 U/ML

## 2021-02-12 ENCOUNTER — VIRTUAL VISIT (OUTPATIENT)
Dept: UROLOGY | Facility: CLINIC | Age: 66
End: 2021-02-12
Payer: COMMERCIAL

## 2021-02-12 VITALS — WEIGHT: 172 LBS | BODY MASS INDEX: 24.62 KG/M2 | HEIGHT: 70 IN

## 2021-02-12 DIAGNOSIS — N34.2 URETHRITIS: Primary | ICD-10-CM

## 2021-02-12 DIAGNOSIS — N40.1 BENIGN PROSTATIC HYPERPLASIA WITH URINARY HESITANCY: ICD-10-CM

## 2021-02-12 DIAGNOSIS — R39.11 BENIGN PROSTATIC HYPERPLASIA WITH URINARY HESITANCY: ICD-10-CM

## 2021-02-12 PROCEDURE — 99213 OFFICE O/P EST LOW 20 MIN: CPT | Mod: 95 | Performed by: NURSE PRACTITIONER

## 2021-02-12 RX ORDER — TAMSULOSIN HYDROCHLORIDE 0.4 MG/1
0.4 CAPSULE ORAL DAILY
Qty: 90 CAPSULE | Refills: 3 | Status: SHIPPED | OUTPATIENT
Start: 2021-02-12 | End: 2021-05-18

## 2021-02-12 ASSESSMENT — PAIN SCALES - GENERAL: PAINLEVEL: NO PAIN (0)

## 2021-02-12 ASSESSMENT — MIFFLIN-ST. JEOR: SCORE: 1581.44

## 2021-02-12 NOTE — PROGRESS NOTES
Eric is a 65 year old who is being evaluated via a billable video visit.      How would you like to obtain your AVS? MyChart  If the video visit is dropped, the invitation should be resent by: Send to e-mail at: [email or web address]  Will anyone else be joining your video visit? No    Video Start Time: 10:28 AM  Video-Visit Details    Type of service:  Video Visit    Video End Time:10:59 AM    Originating Location (pt. Location): Home    Distant Location (provider location):  [facility] [place]     Platform used for Video Visit: SimpleTuition         Eric Espinosa complains of   Chief Complaint   Patient presents with     Follow Up     LUTS, symptoms haven't changed since starting Flomax at last visit, hard time initiating flow, weak stream, frequent night time urination.        Assessment/Plan:  65 year old male with a history of LUTS and two UTIs, both showing E.coli, who has had stable urinary symptoms on Flomax with no new UTIs since our last visit. Continues to have issues with balanitis, although this has improved to some degree with topical hydrocortisone. Given the reported inflammation near his urethral area, with obtain urine testing to rule out contributing infection. He is planning to undergo allergy testing soon, which may also be helpful.   -Continue Flomax daily  -UA/UC to rule out infection  -Continue hydrocortisone as prescribed by derm  -Follow up with me in 3-4 months for an in-person clinic visit to repeat uroflow/PVR and complete exam of glans and foreskin.    Renetta Pavon, CNP  Department of Urology      Subjective:   65 year old male who presents for follow up via video visit regarding LUTS (urinary frequency, nocturia, hesitancy, urgency, UUI, intermittency, weak stream, and sensation of incomplete emptying). Was diagnosed with two UTIs several months ago, both showing E.coli. I saw him on 11/10/20 following these infections, and it was unclear if the second represented  reinfection vs. persistent infection. Uroflow performed, showing intermittent flow with good bladder emptying. Started on Flomax.     Today, he states that his urinary symptoms are stable, but he continues to have issues with balanitis of his glans and foreskin--this has been present since April. He saw dermatology around the time of our last visit, and was prescribed hydrocortisone 2.5% to be applied to the area BID. He has been using twice a day since that time, and although his foreskin has improved, the inflammation of his glans has never completely cleared up. States that his urethral area still seems quite red. He has also tried applying vaseline because the skin is peeling. Of note, has used OTC antifungal creams in the past with no improvement. He has therefore also pursued evaluation from the allergy team, as he also has a long history of constant sore throat and various rashes in different parts of his body. They are planning patch allergy testing coming up.     No diagnosed UTIs since our last visit.       Objective:     Exam:   Patient is a 65 year old male   General Appearance: Well groomed, hygenic  Respiratory: No cough, no respiratory distress or labored breathing  Musculoskeletal: Grossly normal with no gross deficits  Skin: No discoloration or apparent rashes  Neurologic: No tremors  Psychiatric: Alert and oriented  Further examination is deferred due to the nature of our visit.

## 2021-02-12 NOTE — NURSING NOTE
Chief Complaint   Patient presents with     Follow Up     LUTS, symptoms haven't changed since starting Flomax at last visit, hard time initiating flow, weak stream, frequent night time urination.      - Tayla Landin,   EMT Clinic Support

## 2021-02-12 NOTE — LETTER
2/12/2021       RE: Eric Espinosa  [address]   [facility] [address]     Dear Colleague,    Thank you for referring your patient, Eric Espinosa, to the [facility] [place] at [facility]. Please see a copy of my visit note below.    Eric is a 65 year old who is being evaluated via a billable video visit.      How would you like to obtain your AVS? MyChart  If the video visit is dropped, the invitation should be resent by: Send to e-mail at: [email or web address]  Will anyone else be joining your video visit? No    Video Start Time: 10:28 AM  Video-Visit Details    Type of service:  Video Visit    Video End Time:10:59 AM    Originating Location (pt. Location): Home    Distant Location (provider location):  [facility] [place]     Platform used for Video Visit: DexterNeonode         Eric Espinosa complains of   Chief Complaint   Patient presents with     Follow Up     LUTS, symptoms haven't changed since starting Flomax at last visit, hard time initiating flow, weak stream, frequent night time urination.        Assessment/Plan:  65 year old male with a history of LUTS and two UTIs, both showing E.coli, who has had stable urinary symptoms on Flomax with no new UTIs since our last visit. Continues to have issues with balanitis, although this has improved to some degree with topical hydrocortisone. Given the reported inflammation near his urethral area, with obtain urine testing to rule out contributing infection. He is planning to undergo allergy testing soon, which may also be helpful.   -Continue Flomax daily  -UA/UC to rule out infection  -Continue hydrocortisone as prescribed by derm  -Follow up with me in 3-4 months for an in-person clinic visit to repeat uroflow/PVR and complete exam of glans and foreskin.    Renetta Pavon, CNP  Department of Urology      Subjective:   65 year old male who presents for follow up via  video visit regarding LUTS (urinary frequency, nocturia, hesitancy, urgency, UUI, intermittency, weak stream, and sensation of incomplete emptying). Was diagnosed with two UTIs several months ago, both showing E.coli. I saw him on 11/10/20 following these infections, and it was unclear if the second represented reinfection vs. persistent infection. Uroflow performed, showing intermittent flow with good bladder emptying. Started on Flomax.     Today, he states that his urinary symptoms are stable, but he continues to have issues with balanitis of his glans and foreskin--this has been present since April. He saw dermatology around the time of our last visit, and was prescribed hydrocortisone 2.5% to be applied to the area BID. He has been using twice a day since that time, and although his foreskin has improved, the inflammation of his glans has never completely cleared up. States that his urethral area still seems quite red. He has also tried applying vaseline because the skin is peeling. Of note, has used OTC antifungal creams in the past with no improvement. He has therefore also pursued evaluation from the allergy team, as he also has a long history of constant sore throat and various rashes in different parts of his body. They are planning patch allergy testing coming up.     No diagnosed UTIs since our last visit.       Objective:     Exam:   Patient is a 65 year old male   General Appearance: Well groomed, hygenic  Respiratory: No cough, no respiratory distress or labored breathing  Musculoskeletal: Grossly normal with no gross deficits  Skin: No discoloration or apparent rashes  Neurologic: No tremors  Psychiatric: Alert and oriented  Further examination is deferred due to the nature of our visit.

## 2021-02-12 NOTE — PATIENT INSTRUCTIONS
UROLOGY CLINIC VISIT PATIENT INSTRUCTIONS    -Continue Flomax.   -Will check a urinalysis and culture to rule out infection.   -Continue with hydrocortisone cream.  -Follow up with me in 3-4 months for an in-office visit to repeat the uroflow study and measure post-void residual.     If you have any issues, questions or concerns in the meantime, do not hesitate to contact us at [phone] or via [employer].     It was a pleasure meeting with you today.  Thank you for allowing me and my team the privilege of caring for you today.  YOU are the reason we are here, and I truly hope we provided you with the excellent service you deserve.  Please let us know if there is anything else we can do for you so that we can be sure you are leaving completely satisfied with your care experience.    Renetta Pavon, CNP

## 2021-02-15 ENCOUNTER — TELEPHONE (OUTPATIENT)
Dept: UROLOGY | Facility: CLINIC | Age: 66
End: 2021-02-15

## 2021-02-15 NOTE — TELEPHONE ENCOUNTER
LVM for patient to Follow up with me in 3-4 months for an in-office visit to repeat the uroflow study and measure post-void residual.

## 2021-02-28 NOTE — PROGRESS NOTES
Note for return visit for Dermato-Allergy       Encounter Date: Mar 1, 2021    History of Present Illness:  Eric Espinosa is a 65 year old male who presents in person to the consult following information has been take directly from the prior notes, patients informations, Epic and/or other sources and exam/history performed by myself:     Patient in Derm-Allergy clinic for prick and patch tests as planned in Jan 2021    Additional comments and observations from review of the patient s chart including the following:    See notes for more details    ROS: Patient generally feeling well today   Physical Examination:  General: Well-appearing, appropriately-developed individual.  - Skin: Focused examination of the skin test sites within the consultation was performed.   As above in HPI. No additional skin concerns.  See test results below  Skin on back without active eczematous lesions  - upper respiratory tract: currently no obvious Rhinitis  - lungs: no signs for active and present Asthma/Wheezing or coughing  - eyes: currently no active conjunctivits  - GI system/digestion: currently no problems, no bloating or Diarrhoea      Allergy Problem List:    Specialty Problems        Allergy Diagnoses    Psoriasis        Dermatitis, seborrheic            Earlier History and Allergy exams:    Patient has been on several ENTs (Dr. Cote)with constant sore throat and slight ethmoid sinusitis, no acid reflux --> started about 10 months ago  In addition patient had Balanitis and dermatitis on scalp and trunk/shoulders. On the shoulders based on pictures from September 2020 look like localized seropapules = maybe ectoparasitosis = Chiggers?    >> patient started about 10 years ago Atorvastatin and did stop it in April.       Patient was patient of Dr. Fournier in November 2021:     1. History of NMSC  -BCC, nose, s/p Mohs surgery 2015   -BCC, L superior shoulder ([facility] 2013)  -BCC, forehead (2012 w/ Dr. Fernández in [facility]  [place]).   2. Actinic keratoses, forehead s/p cryo   3. Hemangioma L shoudler s/p excision 4/26/2016 (suspected lymph node)  4. Benign Bx  - IDN, central forehead, s/p shave bx 11/1/19  5 Seborrheic dermatitis  - patch on left temporal scalp with predominant scale  - Current tx: Alternate Selsan blue and head & shoulders for shampooing, lidex solution as needed (discussed 10/19/18)  6. Balanitis  - Current Tx: hydrocortisone 2.5% ointment BID PRN      Past Medical History:   Patient Active Problem List   Diagnosis     Psoriasis     Neoplasm of uncertain behavior of skin     AK (actinic keratosis)     BCC (basal cell carcinoma), trunk     Actinic keratosis     History of skin cancer     Diverticulitis     History of basal cell cancer     Dermal nevus     Dermatitis, seborrheic     Past Medical History:   Diagnosis Date     Arthritis      Autoimmune disease (H)      Basal cell carcinoma      Chronic tonsillitis      Hoarseness      Skin cancer        Allergy History:     Allergies   Allergen Reactions     Ciprofloxacin Rash       Social History:  The patient is retired. Patient has the following hobbies or non-occupational exposure      reports that he has never smoked. He has never used smokeless tobacco. He reports current alcohol use. He reports that he does not use drugs.      Family History:  Family History   Problem Relation Age of Onset     Diabetes Father      Heart Disease Father      Hypertension Mother      Cancer No family hx of         No family history of skin cancer     Skin Cancer No family hx of      Glaucoma No family hx of      Macular Degeneration No family hx of        Medications:  Current Outpatient Medications   Medication Sig Dispense Refill     ARTIFICIAL TEAR OP Apply 1 drop to eye as needed       atorvastatin (LIPITOR) 20 MG tablet Take 1 tablet (20 mg) by mouth daily (Patient not taking: Reported on 10/10/2020) 90 tablet 3     fluocinonide (LIDEX) 0.05 % solution Apply topically 2 times daily  (Patient not taking: Reported on 10/10/2020) 60 mL 1     hydrocortisone 2.5 % ointment Apply topically 2 times daily To inflammation on penis 30 g 11     ibuprofen (ADVIL,MOTRIN) 200 MG tablet Take 200 mg by mouth every 4 hours as needed       MELATONIN PO Take 5 mg by mouth At Bedtime        tamsulosin (FLOMAX) 0.4 MG capsule Take 1 capsule (0.4 mg) by mouth daily 90 capsule 3     zaleplon (SONATA) 5 MG capsule Take 1 capsule (5 mg) by mouth nightly as needed for sleep 15 capsule 0     zolpidem (AMBIEN) 5 MG tablet Take 1 tablet (5 mg) by mouth nightly as needed for sleep (Patient not taking: Reported on 10/10/2020) 30 tablet 1       Allergy tests:    Past Allergy tests      Current Allergy tests (or planned)      Order for PATCH TESTS    [x] Outpatient  [] Inpatient: [facility]..../ Bed ....      Skin Atopy (atopic dermatitis) [] Yes   [x] No  Rhinitis/Sinusitis:   [x] Yes   [] No  Allergic Asthma:   [] Yes   [x] No  Food Allergy:   [] Yes   [x] No  Leg ulcers:   [] Yes   [x] No  Hand eczema:   [] Yes   [x] No   Leading hand:   [] R   [] L       [] Ambidextrous                        Reason for tests (suspected allergy): recurrent dermatitis on scalp and glans penis and maybe oral cavity  Known previous allergies: none    Standardized panels  [x] Standard panel (38 tests)  [x] Preservatives & Antimicrobials (27 tests)  [x] Emulsifiers & Additives (21 tests)   [x] Perfumes/Flavours & Plants (24 tests)  [] Hairdresser panel (12 tests)  [] Rubber Chemicals (22 tests)  [] Plastics (26 tests)  [x] Colorants/Dyes/Food additives (17 tests)  [] Metals (implants/dental) (24 tests)  [] Local anaesthetics/NSAIDs (14 tests)  [x] Antibiotics & Antimycotics (14 tests)   [] Corticosteroids (15 tests)   [] Photopatch test (62 tests)   [] others: ...      [] Patient's own products: ...    DO NOT test if chemical or biological identity is unknown!     always ask from patient the product information and safety sheets (MSDS)     Order for  PRICK TESTS    [x] Outpatient  [] Inpatient: [facility]..../ Bed ....      Skin Atopy (atopic dermatitis) [] Yes   [x] No  Contact allergies:   [x] Yes   [] No  Urticaria/Angioedema  [] Yes   [x] No  Rhinitis/Sinusitis:   [] Yes   [x] No recently some recurrent Sinusitis with yellow exudate   [] seasonal [] perennial            Allergic Asthma:   [] Yes   [x] No  Pets :  [x] Yes   [] No  Which? Had dogs for 20 years (last one  2 weeks ago)  Food Allergy:   [x] Yes   [x] No  Drug allergies:    X Yes   [] No Ciprofloxacine rash on chest    Reason for tests (suspected allergy): chronic pharyngitis with recurrent Sinusitis  Known previous allergies: none    Standardized prick panels  [x] Atopic panel (20 tests)  [] Pediatric Panel (12 tests)  [] Milk, Meat, Eggs, Grains (20 tests)   [] Dust, Epithelia, Feathers (10 tests)  [] Fish, Seafood, Shellfish (17 tests)  [] Nuts, Beans (14 tests)  [] Spice, Vegetable, Fruit (17 tests)  [] Others: ...      [] Patient's own products: ...    DO NOT test if chemical or biological identity is unknown!     always ask from patient the product information and safety sheets (MSDS)     Standardized intradermal tests  [x] Penicillium notatum [x] Aspergillus fumigatus [x] House dust mites  [] Bee venom   [] Wasp venom !!Specific protocol with dilutions!!  [] Others: ...    RESULTS & EVALUATION of PATCH TESTS    Patch test readings after     [x] 2 days, [] 3 days [x] 4 days, [] 5 days,    Applied patch tests with results (2021 applied patch tests):    STANDARD Series      No Substance 2 days 4 days remarks   1 Sumit Mix [C] - -    2 Colophony - -    3  2-Mercaptobenzothiazole  - -     4 Methylisothiazolinone - -    5 Carba Mix - -    6 Thiuram Mix [A] - -    7 Bisphenol A Epoxy Resin - -    8 O-Naze-Jgxttgbjasv-Formaldehyde Resin - -    9 Mercapto Mix [A] - -    10 Black Rubber Mix- PPD [B] - -    11 Potassium Dichromate  -  -    12 Balsam of [place] (Myroxylon Pereirae Resin) - -    13  Nickel Sulphate Hexahydrate - -    14 Mixed Dialkyl Thiourea - -    15 Paraben Mix [B] - -    16 Methyldibromo Glutaronitrile - -    17 Fragrance Mix - -    18 2-Bromo-2-Nitropropane-1,3-Diol (Bronopol) NA NA    19 Lyral - -    20 Tixocortol-21- Pivalate - -    21 Diazolidiyl Urea (Germall II) - -    22 Methyl Methacrylate - -    23 Cobalt (II) Chloride Hexahydrate - -    24 Fragrance Mix II  - -    25 Compositae Mix - -    26 Benzoyl Peroxide - -    27 Bacitracin - -    28 Formaldehyde - -    29 Methylchloroisothiazolinone / Methylisothiazolinone - -    30 Corticosteroid Mix NA NA    31 Sodium Lauryl Sulfate - -    32 Lanolin Alcohol - -    33 Turpentine - -    34 Cetylstearylalcohol - -    35 Chlorhexidine Dicluconate - -    36 Budenoside - -    37 Imidazolidinyl Urea  - -    38 Ethyl-2 Cyanoacrylate - -    39 Quaternium 15 (Dowicil 200) - -    40 Decyl Glucoside - -      COLORANTS, DYES, FOOD ADDITIVES   No Substance 2 days 4 days remarks   41 1 Aniline - -    42 2 Keyshawn Brown R - -    43 3 Textile Dye Mix [A] - -    44 4 Hungry Horse  - -    45 5 Disperse Blue-3 - -    46 6 Disperse [place]-3 - -    47 7 Disperse Red-11 NA NA    48 8 Disperse Yellow-9 - -    49 9 Erythrosine-B - -    50 10 Naphthol AS - -       Food additives      51 11 Aspartame - -    52 12 Azorubine - -    53 13 Brilliant Black - -    54 14 Cochineal Red - -    55 15 Patent Blue-VF NA NA    56 16 Pectin - -    57 17 Quinoline Yellow - -    58 18 Saccharin - -    59 19 Tartrazine - -    60 20 Sodium Glutamate NA NA      EMULSIFIERS & ADDITIVES   No Substance 2 days 4 days remarks   61 1 Polyethylene Glycol-400 NA NA    62 2 Cocamidopropyl Betaine - -    63 3 Amerchol L101 - -    64 4 Propylene Glycol - -    65 5 Triethanolamine - -    66 6 Sorbitane Sesquiolate - -    67 7 Isopropylmyristate - -    68 8 Polysorbate 80  - -    69 9 Amidoamine   (Stearamidopropyl Dimethylamine) - -    70 10 Oleamidopropyl Dimethylamine - -    71 11 Lauryl Glucoside  NA NA    72 12 Coconut Diethanolamide  - -    73 13 2-Hydroxy-4-Methoxy Benzophenone (Oxybenzone) - -    74 14 Benzophenone-4 (Sulisobenzon) - -    75 15 Propolis NA NA    76 16 Dexpanthenol - -    77 17 Carboxymethyl Cellulose Sodium - -    78 18 Abitol - -    79 19 Tert-Butylhydroquinone - -    80 20 Benzyl Salicylate - -      Antioxidant      81 21 Dodecyl Gallate - -    82 22 Butylhydroxyanisole (BHA) - -    83 23 Butylhydroxytoluene (BHT) - -    84 24 Di-Alpha-Tocopherol (Vit E) - -    85 25 Propyl Gallate - -    86 26 Zinc Pyrithione NA NA    87 27 Dimethylaminopropylamin (DMAPA)        PRESERVATIVES & ANTIMICROBIALS     No Substance 2 days 4 days remarks   88 1  1,2-Benzisothiazoline-3-One, Sodium Salt - -    89 2  1,3,5-Tano (2-Hydroxyethyl) - Hexahydrotriazine (Grotan BK) - -    90 3 8-Wgrzwnamicduc-0-Nitro-1, 3-Propanediol - -    91 4  3, 4, 4' - Triclocarban - -    92 5 4 - Chloro - 3 - Cresol - -    93 6 4 - Chloro - 4 - Xylenol (PCMX) - -    94 7 7-Ethylbicyclooxazolidine (Bioban [id number]) - -    95 8 Benzalkonium Chloride NA NA    96 9 Benzyl Alcohol NA NA    97 10 Cetalkonium Chloride - -    98 11 Cetylpyrimidine Chloride  - -    99 12 Chloroacetamide - -    100 13 DMDM Hydantoin NA NA    101 14 Glutaraldehyde - -    102 15 Triclosan - -    103 16 Glyoxal Trimeric Dihydrate - -    104 17 Iodopropynyl Butylcarbamate - -    105 18 Octylisothiazoline - -    106 19 Iodoform NA NA    107 20 (Nitrobutyl) Morpholine/(Ethylnitro-Trimethylene) Dimorpholine (Bioban P 1487) - -    108 21 Phenoxyethanol - -    109 22 Phenyl Salicylate - -    110 23 Povidone Iodine - -    111 24 Sodium Benzoate - -    112 25 Sodium Disulfite - -    113 26 Sorbic Acid - -    114 27 Thimerosal - -    115 28 Melamine Formeladyde Resin      116 29 Ethylenediamine Dihydrochloride        Parabens      117 30 Butyl-P-Hydroxybenzoate - -    118 31 Ethyl-P-Hydroxybenzoate - -    119 32 Methyl-P-Hydroxybenzoate - -    120 33  Propyl-P-Hydroxybenzoate - -      PERFUMES, FLAVORS & PLANTS      No Substance 2 days 4 days remarks   121 1 Benzyl Cinnamate - -    122 2 Di-Limonene (Dipentene) - -    123 3 Cananga Odorata (Niki Prince) (I) - -    124 4 Lichen Acid Mix - -    125 5 Mentha Piperita Oil (Peppermint Oil) - -    126 6 Sesquiterpenelactone mix - -    127 7 Tea Tree Oil, Oxidized - -    128 8 Wood Tar Mix - -    129 9 Abietic Acid - -    130 10 Lavendula Angustifolia Oil (Lavender Oil) - -    131 11 Camphor  NA NA      Fragrance Mix I      132 12 Oakmoss Absolute - -    133 13 Eugenol - -    134 14 Geraniol - -    135 15 Hydroxycitronellal - -    136 16 Isoeugenol - -    137 17 Cinnamic Aldehyde - -    138 18 Cinnamic Alcohol  - -      Fragrance mix II      139 19 Citronellol - -    140 20 Alpha-Hexylcinnamic Aldehyde    - -    141 21 Citral - -    142 22 Farnesol - -    143 23 Coumarin - -      ANTIBIOTICS & ANTIMYCOTICS    No Substance 2 days 4 days remarks   144 1 Erythromycine - -    145 2 Framycetine Sulphate - -    146 3 Fusidic Acid Sodium Salt - -    147 4 Gentamicin Sulphate - -    148 5 Neomycine Sulphate - -    149 6 Oxytetracycline  - -    150 7 Polymyxin B Sulphate - -    151 8 Tetracycline-HCL - -    152 9 Sulfanilamide - -    153 10 Metronidazole - -    154 11 Oxyquinoline Mix - -    155 12 Nitrofurazone - -    156 13 Nystatin - -    157 14 Clotrimazole - -      OTHER PRODUCTS      No Substance Conc  % solv 2 days 4 days remarks    1          2          3          4          5          6          7          8          9          10           Results of patch tests:                         Interpretation:    - Negative                    A    = Allergic      (+) Erythema    TI   = Toxic/irritant   + E + Infiltration    RaP = Relevance at Present     ++ E/I + Papulovesicle   Rpr  = Relevance Previously     +++ E/I/P + Blister     nR   = No Relevance      Atopy Screen (Placed 03/01/21 )    No Substance Readings (15 min)  Evaluation   POS Histamine 1mg/ml -    NEG NaCl 0.9% -      No Substance Readings (15 min) Evaluation   1 Alternaria alternata (tenuis)  -    2 Cladosporium herbarum -    3 Aspergillus fumigatus -    4 Penicillium notatum -    5 Dermatophagoides pteronyssinus -    6 Dermatophagoides farinae -    7 Dog epithelium (canis spp) -    8 Cat hair (crow catus) -    9 Cockroach   (Blatella americana & germanica) -    10 Grass mix midwest   ([place], Orchard, [place], [place]ber) -    11 Alonso grass (sorghum halepense) -    12 Weed mix   (common Cocklebur, Lamb s quarters, rough redroot Pigweed, Dock/Sorrel) -    13 Mug wort (artemisia vulgare) -    14 Ragweed giant/short (ambrosia spp) -    15 English Plantain (plantago lanceolata) -    16 Tree mix 1 (Pecan, Maple BHR, Oak RVW, american [place], black [place]) -    17 Red cedar (juniperus virginia) -    18 Tree mix 2   (white [place], river/red Birch, black [place], common [place], american Elm) -    19 Box elder/Maple mix (acer spp) -    20 [place] shagbark (carya ovata) -       -      Conclusion:      Intradermal Testing (Placed 03/01/21 )    No Substance Conc.  Readings (15min) Evaluation   - NaCl  0.9% -    + Histamine (prick) 0.1mg / ml -    DF Standard Dust Mite - D. Farinae 1:10 -    DP Standard Dust Mite - D. Pteronyssinus 1:10 -    A Aspergillus fumigatus  1:10 -    P Penicillium notatum 1:10 -      Conclusion:     [] No relevant allergic reaction observed    [] Allergic reaction diagnosed against following allergens:      Interpretation/ remarks:   See later    [] Patient information given   [] ACDS CAMP information's (# ....) to following compounds: .....   [] General information's to following compounds: ......    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    ==> Treatment prescribed/Plan:  See later      Follow-up: in Derm-Allergy clinic for 1st readings of patch tests after 2 days (virtual) and 2nd readings and final conclusions after 4 days (in person)      Patient counseling: Stop all antihistamines 1 week before tests      Thank you for the opportunity be involved in the care of this patient.     Staff: Reema Perry and Alycia Pizarro RN    I spent a total of *** minutes face to face with Eric Espinosa during today s office visit. Over 50% of this time was spent discussing all the individual test results, correlating them to the clinical relevance, counseling the patient and/or coordinating care. Please see Assessment and Plan for details.  This excludes any time spent performing ____________ (ex:  bx, applying patch tests, cryo therapy)

## 2021-03-01 ENCOUNTER — OFFICE VISIT (OUTPATIENT)
Dept: ALLERGY | Facility: CLINIC | Age: 66
End: 2021-03-01
Payer: COMMERCIAL

## 2021-03-01 DIAGNOSIS — N34.2 URETHRITIS: ICD-10-CM

## 2021-03-01 DIAGNOSIS — L23.89 ALLERGIC CONTACT DERMATITIS DUE TO OTHER AGENTS: Primary | ICD-10-CM

## 2021-03-01 DIAGNOSIS — N48.1 BALANITIS: ICD-10-CM

## 2021-03-01 DIAGNOSIS — J02.9 ALLERGIC PHARYNGITIS: ICD-10-CM

## 2021-03-01 DIAGNOSIS — Z88.9 ATOPY: ICD-10-CM

## 2021-03-01 DIAGNOSIS — R39.11 BENIGN PROSTATIC HYPERPLASIA WITH URINARY HESITANCY: ICD-10-CM

## 2021-03-01 DIAGNOSIS — J45.991 COUGH VARIANT ASTHMA: ICD-10-CM

## 2021-03-01 DIAGNOSIS — N40.1 BENIGN PROSTATIC HYPERPLASIA WITH URINARY HESITANCY: ICD-10-CM

## 2021-03-01 LAB
ALBUMIN UR-MCNC: NEGATIVE MG/DL
AMORPH CRY #/AREA URNS HPF: ABNORMAL /HPF
APPEARANCE UR: ABNORMAL
BACTERIA #/AREA URNS HPF: ABNORMAL /HPF
BILIRUB UR QL STRIP: NEGATIVE
COLOR UR AUTO: YELLOW
GLUCOSE UR STRIP-MCNC: NEGATIVE MG/DL
HGB UR QL STRIP: NEGATIVE
KETONES UR STRIP-MCNC: NEGATIVE MG/DL
LEUKOCYTE ESTERASE UR QL STRIP: ABNORMAL
NITRATE UR QL: NEGATIVE
PH UR STRIP: 8 PH (ref 5–7)
RBC #/AREA URNS AUTO: 2 /HPF (ref 0–2)
SOURCE: ABNORMAL
SP GR UR STRIP: 1.01 (ref 1–1.03)
UROBILINOGEN UR STRIP-MCNC: 0 MG/DL (ref 0–2)
WBC #/AREA URNS AUTO: 8 /HPF (ref 0–5)

## 2021-03-01 PROCEDURE — 81001 URINALYSIS AUTO W/SCOPE: CPT | Performed by: PATHOLOGY

## 2021-03-01 PROCEDURE — 95044 PATCH/APPLICATION TESTS: CPT | Mod: 59 | Performed by: DERMATOLOGY

## 2021-03-01 PROCEDURE — 87086 URINE CULTURE/COLONY COUNT: CPT | Mod: 90 | Performed by: PATHOLOGY

## 2021-03-01 PROCEDURE — 99000 SPECIMEN HANDLING OFFICE-LAB: CPT | Performed by: PATHOLOGY

## 2021-03-01 PROCEDURE — 87088 URINE BACTERIA CULTURE: CPT | Mod: 90 | Performed by: PATHOLOGY

## 2021-03-01 PROCEDURE — 87186 SC STD MICRODIL/AGAR DIL: CPT | Mod: 90 | Performed by: PATHOLOGY

## 2021-03-01 PROCEDURE — 95004 PERQ TESTS W/ALRGNC XTRCS: CPT | Performed by: DERMATOLOGY

## 2021-03-01 PROCEDURE — 95024 IQ TESTS W/ALLERGENIC XTRCS: CPT | Performed by: DERMATOLOGY

## 2021-03-01 NOTE — PATIENT INSTRUCTIONS
Removal of Patch Tests on Day 3:    1. Remove patches and tape from one test area (one rectangle)          2. Using the purple surgical markers provided (or other permanent marker), draw a grid around the test area so that the circular indentation is in the center of each square, as below. Try to be as neat as possible and keep the lines as straight as you can (you can use a ruler if you need to)          3. Redraw the number that was underneath the tape above the grids, as shown below. Try to print clearly.          4. Repeat the process for the remaining test areas.          5. Photograph the entire area then take close-up photos of any possible reactions. Examples of possible reactions are spots that look like these:          6. Return to clinic for evaluation as instructed. You should continue to avoid getting the area wet (no showering or strenuous exercise), exposing the area to UV light, using topical steroids, or scratching.         Who should I call with questions?    [facility], [place]: [phone]    For urgent needs outside of business hours call the [facility] at [phone] and ask for the dermatology resident on call      If you develop any serious or adverse allergic reaction after office hours please seek immediate medical assistance at the nearest clinic or emergency room    How to upload a photo via [employer]:    Click on messaging  Click on ask a question  Choose Medical advise  Click on attach an image  Pick the image you want to upload ( You can upload up to three photos per message)  Write a message  Click send

## 2021-03-01 NOTE — PROGRESS NOTES
Note for return visit for Dermato-Allergy       Encounter Date: Mar 1, 2021    History of Present Illness:  Eric Espinosa is a 65 year old male who presents in person to the consult following information has been take directly from the prior notes, patients informations, Epic and/or other sources and exam/history performed by myself:     Patient in Derm-Allergy clinic for prick and patch tests as planned in Jan 2021    Additional comments and observations from review of the patient s chart including the following:    See notes for more details    ROS: Patient generally feeling well today   Physical Examination:  General: Well-appearing, appropriately-developed individual.  - Skin: Focused examination of the skin test sites within the consultation was performed.   As above in HPI. No additional skin concerns.  See test results below  Skin on back without active eczematous lesions  - upper respiratory tract: currently no obvious Rhinitis  - lungs: no signs for active and present Asthma/Wheezing or coughing  - eyes: currently no active conjunctivits  - GI system/digestion: currently no problems, no bloating or Diarrhoea      Allergy Problem List:    Specialty Problems        Allergy Diagnoses    Psoriasis        Dermatitis, seborrheic            Earlier History and Allergy exams:    Patient has been on several ENTs (Dr. Cote)with constant sore throat and slight ethmoid sinusitis, no acid reflux --> started about 10 months ago  In addition patient had Balanitis and dermatitis on scalp and trunk/shoulders. On the shoulders based on pictures from September 2020 look like localized seropapules = maybe ectoparasitosis = Chiggers?    >> patient started about 10 years ago Atorvastatin and did stop it in April.       Patient was patient of Dr. Fournier in November 2021:     1. History of NMSC  -BCC, nose, s/p Mohs surgery 2015   -BCC, L superior shoulder ([facility] 2013)  -BCC, forehead (2012 w/ Dr. Fernández in [facility]  [place]).   2. Actinic keratoses, forehead s/p cryo   3. Hemangioma L shoudler s/p excision 4/26/2016 (suspected lymph node)  4. Benign Bx  - IDN, central forehead, s/p shave bx 11/1/19  5 Seborrheic dermatitis  - patch on left temporal scalp with predominant scale  - Current tx: Alternate Selsan blue and head & shoulders for shampooing, lidex solution as needed (discussed 10/19/18)  6. Balanitis  - Current Tx: hydrocortisone 2.5% ointment BID PRN      Past Medical History:   Patient Active Problem List   Diagnosis     Psoriasis     Neoplasm of uncertain behavior of skin     AK (actinic keratosis)     BCC (basal cell carcinoma), trunk     Actinic keratosis     History of skin cancer     Diverticulitis     History of basal cell cancer     Dermal nevus     Dermatitis, seborrheic     Past Medical History:   Diagnosis Date     Arthritis      Autoimmune disease (H)      Basal cell carcinoma      Chronic tonsillitis      Hoarseness      Skin cancer        Allergy History:     Allergies   Allergen Reactions     Ciprofloxacin Rash       Social History:  The patient is retired. Patient has the following hobbies or non-occupational exposure      reports that he has never smoked. He has never used smokeless tobacco. He reports current alcohol use. He reports that he does not use drugs.      Family History:  Family History   Problem Relation Age of Onset     Diabetes Father      Heart Disease Father      Hypertension Mother      Cancer No family hx of         No family history of skin cancer     Skin Cancer No family hx of      Glaucoma No family hx of      Macular Degeneration No family hx of        Medications:  Current Outpatient Medications   Medication Sig Dispense Refill     ARTIFICIAL TEAR OP Apply 1 drop to eye as needed       atorvastatin (LIPITOR) 20 MG tablet Take 1 tablet (20 mg) by mouth daily (Patient not taking: Reported on 10/10/2020) 90 tablet 3     fluocinonide (LIDEX) 0.05 % solution Apply topically 2 times daily  (Patient not taking: Reported on 10/10/2020) 60 mL 1     hydrocortisone 2.5 % ointment Apply topically 2 times daily To inflammation on penis 30 g 11     ibuprofen (ADVIL,MOTRIN) 200 MG tablet Take 200 mg by mouth every 4 hours as needed       MELATONIN PO Take 5 mg by mouth At Bedtime        tamsulosin (FLOMAX) 0.4 MG capsule Take 1 capsule (0.4 mg) by mouth daily 90 capsule 3     zaleplon (SONATA) 5 MG capsule Take 1 capsule (5 mg) by mouth nightly as needed for sleep 15 capsule 0     zolpidem (AMBIEN) 5 MG tablet Take 1 tablet (5 mg) by mouth nightly as needed for sleep (Patient not taking: Reported on 10/10/2020) 30 tablet 1       Allergy tests:    Past Allergy tests      Current Allergy tests (or planned)      Order for PATCH TESTS    [x] Outpatient  [] Inpatient: [facility]..../ Bed ....      Skin Atopy (atopic dermatitis) [] Yes   [x] No  Rhinitis/Sinusitis:   [x] Yes   [] No  Allergic Asthma:   [] Yes   [x] No  Food Allergy:   [] Yes   [x] No  Leg ulcers:   [] Yes   [x] No  Hand eczema:   [] Yes   [x] No   Leading hand:   [] R   [] L       [] Ambidextrous                        Reason for tests (suspected allergy): recurrent dermatitis on scalp and glans penis and maybe oral cavity  Known previous allergies: none    Standardized panels  [x] Standard panel (38 tests)  [x] Preservatives & Antimicrobials (27 tests)  [x] Emulsifiers & Additives (21 tests)   [x] Perfumes/Flavours & Plants (24 tests)  [] Hairdresser panel (12 tests)  [] Rubber Chemicals (22 tests)  [] Plastics (26 tests)  [x] Colorants/Dyes/Food additives (17 tests)  [] Metals (implants/dental) (24 tests)  [] Local anaesthetics/NSAIDs (14 tests)  [x] Antibiotics & Antimycotics (14 tests)   [] Corticosteroids (15 tests)   [] Photopatch test (62 tests)   [] others: ...      [] Patient's own products: ...    DO NOT test if chemical or biological identity is unknown!     always ask from patient the product information and safety sheets (MSDS)     Order for  PRICK TESTS    [x] Outpatient  [] Inpatient: [facility]..../ Bed ....      Skin Atopy (atopic dermatitis) [] Yes   [x] No  Contact allergies:   [x] Yes   [] No  Urticaria/Angioedema  [] Yes   [x] No  Rhinitis/Sinusitis:   [] Yes   [x] No recently some recurrent Sinusitis with yellow exudate   [] seasonal [] perennial            Allergic Asthma:   [] Yes   [x] No  Pets :  [x] Yes   [] No  Which? Had dogs for 20 years (last one  2 weeks ago)  Food Allergy:   [x] Yes   [x] No  Drug allergies:    X Yes   [] No Ciprofloxacine rash on chest    Reason for tests (suspected allergy): chronic pharyngitis with recurrent Sinusitis  Known previous allergies: none    Standardized prick panels  [x] Atopic panel (20 tests)  [] Pediatric Panel (12 tests)  [] Milk, Meat, Eggs, Grains (20 tests)   [] Dust, Epithelia, Feathers (10 tests)  [] Fish, Seafood, Shellfish (17 tests)  [] Nuts, Beans (14 tests)  [] Spice, Vegetable, Fruit (17 tests)  [] Others: ...      [] Patient's own products: ...    DO NOT test if chemical or biological identity is unknown!     always ask from patient the product information and safety sheets (MSDS)     Standardized intradermal tests  [x] Penicillium notatum [x] Aspergillus fumigatus [x] House dust mites  [] Bee venom   [] Wasp venom !!Specific protocol with dilutions!!  [] Others: ...    RESULTS & EVALUATION of PATCH TESTS    Patch test readings after     [x] 2 days, [] 3 days [x] 4 days, [] 5 days,    Applied patch tests with results (2021 applied patch tests):    STANDARD Series      No Substance 2 days 4 days remarks   1 Sumit Mix [C] - -    2 Colophony - -    3  2-Mercaptobenzothiazole  - -     4 Methylisothiazolinone - -    5 Carba Mix - -    6 Thiuram Mix [A] - -    7 Bisphenol A Epoxy Resin - -    8 Y-Gxmr-Wwqlhsveidn-Formaldehyde Resin - -    9 Mercapto Mix [A] - -    10 Black Rubber Mix- PPD [B] - -    11 Potassium Dichromate  -  -    12 Balsam of [place] (Myroxylon Pereirae Resin) - -    13  Nickel Sulphate Hexahydrate - -    14 Mixed Dialkyl Thiourea - -    15 Paraben Mix [B] - -    16 Methyldibromo Glutaronitrile - -    17 Fragrance Mix - -    18 2-Bromo-2-Nitropropane-1,3-Diol (Bronopol) NA NA    19 Lyral - -    20 Tixocortol-21- Pivalate - -    21 Diazolidiyl Urea (Germall II) - -    22 Methyl Methacrylate - -    23 Cobalt (II) Chloride Hexahydrate - -    24 Fragrance Mix II  - -    25 Compositae Mix - -    26 Benzoyl Peroxide - -    27 Bacitracin - -    28 Formaldehyde - -    29 Methylchloroisothiazolinone / Methylisothiazolinone - -    30 Corticosteroid Mix NA NA    31 Sodium Lauryl Sulfate - -    32 Lanolin Alcohol - -    33 Turpentine - -    34 Cetylstearylalcohol - -    35 Chlorhexidine Dicluconate - -    36 Budenoside - -    37 Imidazolidinyl Urea  - -    38 Ethyl-2 Cyanoacrylate - -    39 Quaternium 15 (Dowicil 200) - -    40 Decyl Glucoside - -      COLORANTS, DYES, FOOD ADDITIVES   No Substance 2 days 4 days remarks   41 1 Aniline - -    42 2 Keyshawn Brown R - -    43 3 Textile Dye Mix [A] - -    44 4 Dumas  - -    45 5 Disperse Blue-3 - -    46 6 Disperse Socorro-3 - -    47 7 Disperse Red-11 NA NA    48 8 Disperse Yellow-9 - -    49 9 Erythrosine-B - -    50 10 Naphthol AS - -       Food additives      51 11 Aspartame - -    52 12 Azorubine - -    53 13 Brilliant Black - -    54 14 Cochineal Red - -    55 15 Patent Blue-VF NA NA    56 16 Pectin - -    57 17 Quinoline Yellow - -    58 18 Saccharin - -    59 19 Tartrazine - -    60 20 Sodium Glutamate NA NA      EMULSIFIERS & ADDITIVES   No Substance 2 days 4 days remarks   61 1 Polyethylene Glycol-400 NA NA    62 2 Cocamidopropyl Betaine - -    63 3 Amerchol L101 - -    64 4 Propylene Glycol - -    65 5 Triethanolamine - -    66 6 Sorbitane Sesquiolate - -    67 7 Isopropylmyristate - -    68 8 Polysorbate 80  - -    69 9 Amidoamine   (Stearamidopropyl Dimethylamine) - -    70 10 Oleamidopropyl Dimethylamine - -    71 11 Lauryl Glucoside  NA NA    72 12 Coconut Diethanolamide  - -    73 13 2-Hydroxy-4-Methoxy Benzophenone (Oxybenzone) - -    74 14 Benzophenone-4 (Sulisobenzon) - -    75 15 Propolis NA NA    76 16 Dexpanthenol - -    77 17 Carboxymethyl Cellulose Sodium - -    78 18 Abitol - -    79 19 Tert-Butylhydroquinone - -    80 20 Benzyl Salicylate - -      Antioxidant      81 21 Dodecyl Gallate - -    82 22 Butylhydroxyanisole (BHA) - -    83 23 Butylhydroxytoluene (BHT) - -    84 24 Di-Alpha-Tocopherol (Vit E) - -    85 25 Propyl Gallate - -    86 26 Zinc Pyrithione NA NA    87 27 Dimethylaminopropylamin (DMAPA)        PRESERVATIVES & ANTIMICROBIALS     No Substance 2 days 4 days remarks   88 1  1,2-Benzisothiazoline-3-One, Sodium Salt - -    89 2  1,3,5-Tano (2-Hydroxyethyl) - Hexahydrotriazine (Grotan BK) - -    90 3 1-Fwsqzgrmdkkbm-3-Nitro-1, 3-Propanediol - -    91 4  3, 4, 4' - Triclocarban - -    92 5 4 - Chloro - 3 - Cresol - -    93 6 4 - Chloro - 4 - Xylenol (PCMX) - -    94 7 7-Ethylbicyclooxazolidine (Bioban [id number]) - -    95 8 Benzalkonium Chloride NA NA    96 9 Benzyl Alcohol NA NA    97 10 Cetalkonium Chloride - -    98 11 Cetylpyrimidine Chloride  - -    99 12 Chloroacetamide - -    100 13 DMDM Hydantoin NA NA    101 14 Glutaraldehyde - -    102 15 Triclosan - -    103 16 Glyoxal Trimeric Dihydrate - -    104 17 Iodopropynyl Butylcarbamate - -    105 18 Octylisothiazoline - -    106 19 Iodoform NA NA    107 20 (Nitrobutyl) Morpholine/(Ethylnitro-Trimethylene) Dimorpholine (Bioban P 1487) - -    108 21 Phenoxyethanol - -    109 22 Phenyl Salicylate - -    110 23 Povidone Iodine - -    111 24 Sodium Benzoate - -    112 25 Sodium Disulfite - -    113 26 Sorbic Acid - -    114 27 Thimerosal - -    115 28 Melamine Formeladyde Resin      116 29 Ethylenediamine Dihydrochloride        Parabens      117 30 Butyl-P-Hydroxybenzoate - -    118 31 Ethyl-P-Hydroxybenzoate - -    119 32 Methyl-P-Hydroxybenzoate - -    120 33  Propyl-P-Hydroxybenzoate - -      PERFUMES, FLAVORS & PLANTS      No Substance 2 days 4 days remarks   121 1 Benzyl Cinnamate - -    122 2 Di-Limonene (Dipentene) - -    123 3 Cananga Odorata (Niki Prince) (I) - -    124 4 Lichen Acid Mix - -    125 5 Mentha Piperita Oil (Peppermint Oil) - -    126 6 Sesquiterpenelactone mix - -    127 7 Tea Tree Oil, Oxidized - -    128 8 Wood Tar Mix - -    129 9 Abietic Acid - -    130 10 Lavendula Angustifolia Oil (Lavender Oil) - -    131 11 Camphor  NA NA      Fragrance Mix I      132 12 Oakmoss Absolute - -    133 13 Eugenol - -    134 14 Geraniol - -    135 15 Hydroxycitronellal - -    136 16 Isoeugenol - -    137 17 Cinnamic Aldehyde - -    138 18 Cinnamic Alcohol  - -      Fragrance mix II      139 19 Citronellol - -    140 20 Alpha-Hexylcinnamic Aldehyde    - -    141 21 Citral - -    142 22 Farnesol - -    143 23 Coumarin - -      ANTIBIOTICS & ANTIMYCOTICS    No Substance 2 days 4 days remarks   144 1 Erythromycine - -    145 2 Framycetine Sulphate - -    146 3 Fusidic Acid Sodium Salt - -    147 4 Gentamicin Sulphate - -    148 5 Neomycine Sulphate - -    149 6 Oxytetracycline  - -    150 7 Polymyxin B Sulphate - -    151 8 Tetracycline-HCL - -    152 9 Sulfanilamide - -    153 10 Metronidazole - -    154 11 Oxyquinoline Mix - -    155 12 Nitrofurazone - -    156 13 Nystatin - -    157 14 Clotrimazole - -      OTHER PRODUCTS      No Substance Conc  % solv 2 days 4 days remarks    1          2          3          4          5          6          7          8          9          10           Results of patch tests:                         Interpretation:    - Negative                    A    = Allergic      (+) Erythema    TI   = Toxic/irritant   + E + Infiltration    RaP = Relevance at Present     ++ E/I + Papulovesicle   Rpr  = Relevance Previously     +++ E/I/P + Blister     nR   = No Relevance      Atopy Screen (Placed 03/01/21 )    No Substance Readings (15 min)  Evaluation   POS Histamine 1mg/ml +    NEG NaCl 0.9% -      No Substance Readings (15 min) Evaluation   1 Alternaria alternata (tenuis)  -    2 Cladosporium herbarum -    3 Aspergillus fumigatus -    4 Penicillium notatum -    5 Dermatophagoides pteronyssinus -    6 Dermatophagoides farinae -    7 Dog epithelium (canis spp) -    8 Cat hair (crow catus) -    9 Cockroach   (Blatella americana & germanica) -    10 Grass mix midwest   ([place], Orchard, Red[place], Eber) -    11 Alonso grass (sorghum halepense) -    12 Weed mix   (common Cocklebur, Lamb s quarters, rough redroot Pigweed, Dock/Sorrel) -    13 Mug wort (artemisia vulgare) -    14 Ragweed giant/short (ambrosia spp) -    15 English Plantain (plantago lanceolata) -    16 Tree mix 1 (Pecan, Maple BHR, Oak RVW, american [place], black [place]) -    17 Red cedar (juniperus virginia) -    18 Tree mix 2   (white Joselito, river/red Birch, black [place], common [place], american Elm) -    19 Box elder/Maple mix (acer spp) -    20 Stafford shagbark (carya ovata) -       -    Conclusion: no signs for positive prick tests    Intradermal Testing (Placed 03/01/21 )    No Substance Conc.  Readings (15min) Evaluation   + Histamine (prick) 0.1mg / ml +    DF Standard Dust Mite - D. Farinae 1:10 + 10mmP/E   DP Standard Dust Mite - D. Pteronyssinus 1:10 + 10mmP/E   A Aspergillus fumigatus  1:10 neg    P Penicillium notatum 1:10 neg 5mmP     Conclusion:  Slight immediate type reaction to house dust mites. Will see delayed reactions    [] No relevant allergic reaction observed    [] Allergic reaction diagnosed against following allergens:      Interpretation/ remarks:   See later    [] Patient information given   [] ACDS CAMP information's (# ....) to following compounds: .....   [] General information's to following compounds: ......    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    ==> Treatment prescribed/Plan:  See later      Follow-up: in Derm-Allergy clinic for 1st readings of patch tests after 2 days (virtual) and 2nd readings and final conclusions after 4 days (in person)      Patient counseling: Stop all antihistamines 1 week before tests      Thank you for the opportunity be involved in the care of this patient.     Staff: Reema Perry and Alycia Pizarro RN    I spent a total of 24 minutes face to face with Eric Espinosa during today s office visit. Over 50% of this time was spent discussing all the individual test results, correlating them to the clinical relevance, counseling the patient and/or coordinating care. Please see Assessment and Plan for details.  This excludes any time spent performing prick, intradermal and patch tests

## 2021-03-01 NOTE — LETTER
3/1/2021         RE: Eric Espinosa  [address]   [facility] [address]        Dear Colleague,    Thank you for referring your patient, Eric Espinosa, to the [facility] [place]. Please see a copy of my visit note below.    Note for return visit for Dermato-Allergy       Encounter Date: Mar 1, 2021    History of Present Illness:  Eric Espinosa is a 65 year old male who presents in person to the consult following information has been take directly from the prior notes, patients informations, Epic and/or other sources and exam/history performed by myself:     Patient in Derm-Allergy clinic for prick and patch tests as planned in Jan 2021    Additional comments and observations from review of the patient s chart including the following:    See notes for more details    ROS: Patient generally feeling well today   Physical Examination:  General: Well-appearing, appropriately-developed individual.  - Skin: Focused examination of the skin test sites within the consultation was performed.   As above in HPI. No additional skin concerns.  See test results below  Skin on back without active eczematous lesions  - upper respiratory tract: currently no obvious Rhinitis  - lungs: no signs for active and present Asthma/Wheezing or coughing  - eyes: currently no active conjunctivits  - GI system/digestion: currently no problems, no bloating or Diarrhoea      Allergy Problem List:    Specialty Problems        Allergy Diagnoses    Psoriasis        Dermatitis, seborrheic            Earlier History and Allergy exams:    Patient has been on several ENTs (Dr. Cote)with constant sore throat and slight ethmoid sinusitis, no acid reflux --> started about 10 months ago  In addition patient had Balanitis and dermatitis on scalp and trunk/shoulders. On the shoulders based on pictures from September 2020 look like localized seropapules = maybe ectoparasitosis = Chiggers?    >> patient started about 10  years ago Atorvastatin and did stop it in April.       Patient was patient of Dr. Fournier in November 2021:     1. History of NMSC  -BCC, nose, s/p Mohs surgery 2015   -BCC, L superior shoulder ([facility] 2013)  -BCC, forehead (2012 w/ Dr. Fernández in [place], [place]).   2. Actinic keratoses, forehead s/p cryo   3. Hemangioma L shoudler s/p excision 4/26/2016 (suspected lymph node)  4. Benign Bx  - IDN, central forehead, s/p shave bx 11/1/19  5 Seborrheic dermatitis  - patch on left temporal scalp with predominant scale  - Current tx: Alternate Selsan blue and head & shoulders for shampooing, lidex solution as needed (discussed 10/19/18)  6. Balanitis  - Current Tx: hydrocortisone 2.5% ointment BID PRN      Past Medical History:   Patient Active Problem List   Diagnosis     Psoriasis     Neoplasm of uncertain behavior of skin     AK (actinic keratosis)     BCC (basal cell carcinoma), trunk     Actinic keratosis     History of skin cancer     Diverticulitis     History of basal cell cancer     Dermal nevus     Dermatitis, seborrheic     Past Medical History:   Diagnosis Date     Arthritis      Autoimmune disease (H)      Basal cell carcinoma      Chronic tonsillitis      Hoarseness      Skin cancer        Allergy History:     Allergies   Allergen Reactions     Ciprofloxacin Rash       Social History:  The patient is retired. Patient has the following hobbies or non-occupational exposure      reports that he has never smoked. He has never used smokeless tobacco. He reports current alcohol use. He reports that he does not use drugs.      Family History:  Family History   Problem Relation Age of Onset     Diabetes Father      Heart Disease Father      Hypertension Mother      Cancer No family hx of         No family history of skin cancer     Skin Cancer No family hx of      Glaucoma No family hx of      Macular Degeneration No family hx of        Medications:  Current Outpatient Medications   Medication Sig Dispense  Refill     ARTIFICIAL TEAR OP Apply 1 drop to eye as needed       atorvastatin (LIPITOR) 20 MG tablet Take 1 tablet (20 mg) by mouth daily (Patient not taking: Reported on 10/10/2020) 90 tablet 3     fluocinonide (LIDEX) 0.05 % solution Apply topically 2 times daily (Patient not taking: Reported on 10/10/2020) 60 mL 1     hydrocortisone 2.5 % ointment Apply topically 2 times daily To inflammation on penis 30 g 11     ibuprofen (ADVIL,MOTRIN) 200 MG tablet Take 200 mg by mouth every 4 hours as needed       MELATONIN PO Take 5 mg by mouth At Bedtime        tamsulosin (FLOMAX) 0.4 MG capsule Take 1 capsule (0.4 mg) by mouth daily 90 capsule 3     zaleplon (SONATA) 5 MG capsule Take 1 capsule (5 mg) by mouth nightly as needed for sleep 15 capsule 0     zolpidem (AMBIEN) 5 MG tablet Take 1 tablet (5 mg) by mouth nightly as needed for sleep (Patient not taking: Reported on 10/10/2020) 30 tablet 1       Allergy tests:    Past Allergy tests      Current Allergy tests (or planned)      Order for PATCH TESTS    [x] Outpatient  [] Inpatient: Xiao..../ Bed ....      Skin Atopy (atopic dermatitis) [] Yes   [x] No  Rhinitis/Sinusitis:   [x] Yes   [] No  Allergic Asthma:   [] Yes   [x] No  Food Allergy:   [] Yes   [x] No  Leg ulcers:   [] Yes   [x] No  Hand eczema:   [] Yes   [x] No   Leading hand:   [] R   [] L       [] Ambidextrous                        Reason for tests (suspected allergy): recurrent dermatitis on scalp and glans penis and maybe oral cavity  Known previous allergies: none    Standardized panels  [x] Standard panel (38 tests)  [x] Preservatives & Antimicrobials (27 tests)  [x] Emulsifiers & Additives (21 tests)   [x] Perfumes/Flavours & Plants (24 tests)  [] Hairdresser panel (12 tests)  [] Rubber Chemicals (22 tests)  [] Plastics (26 tests)  [x] Colorants/Dyes/Food additives (17 tests)  [] Metals (implants/dental) (24 tests)  [] Local anaesthetics/NSAIDs (14 tests)  [x] Antibiotics & Antimycotics (14 tests)    [] Corticosteroids (15 tests)   [] Photopatch test (62 tests)   [] others: ...      [] Patient's own products: ...    DO NOT test if chemical or biological identity is unknown!     always ask from patient the product information and safety sheets (MSDS)     Order for PRICK TESTS    [x] Outpatient  [] Inpatient: Xiao..../ Bed ....      Skin Atopy (atopic dermatitis) [] Yes   [x] No  Contact allergies:   [x] Yes   [] No  Urticaria/Angioedema  [] Yes   [x] No  Rhinitis/Sinusitis:   [] Yes   [x] No recently some recurrent Sinusitis with yellow exudate   [] seasonal [] perennial            Allergic Asthma:   [] Yes   [x] No  Pets :  [x] Yes   [] No  Which? Had dogs for 20 years (last one  2 weeks ago)  Food Allergy:   [x] Yes   [x] No  Drug allergies:    X Yes   [] No Ciprofloxacine rash on chest    Reason for tests (suspected allergy): chronic pharyngitis with recurrent Sinusitis  Known previous allergies: none    Standardized prick panels  [x] Atopic panel (20 tests)  [] Pediatric Panel (12 tests)  [] Milk, Meat, Eggs, Grains (20 tests)   [] Dust, Epithelia, Feathers (10 tests)  [] Fish, Seafood, Shellfish (17 tests)  [] Nuts, Beans (14 tests)  [] Spice, Vegetable, Fruit (17 tests)  [] Others: ...      [] Patient's own products: ...    DO NOT test if chemical or biological identity is unknown!     always ask from patient the product information and safety sheets (MSDS)     Standardized intradermal tests  [x] Penicillium notatum [x] Aspergillus fumigatus [x] House dust mites  [] Bee venom   [] Wasp venom !!Specific protocol with dilutions!!  [] Others: ...    RESULTS & EVALUATION of PATCH TESTS    Patch test readings after     [x] 2 days, [] 3 days [x] 4 days, [] 5 days,    Applied patch tests with results (2021 applied patch tests):    STANDARD Series      No Substance 2 days 4 days remarks   1 Sumit Mix [C] - -    2 Colophony - -    3  2-Mercaptobenzothiazole  - -     4 Methylisothiazolinone - -    5 Carba  Mix - -    6 Thiuram Mix [A] - -    7 Bisphenol A Epoxy Resin - -    8 F-Donf-Xeximaqshio-Formaldehyde Resin - -    9 Mercapto Mix [A] - -    10 Black Rubber Mix- PPD [B] - -    11 Potassium Dichromate  -  -    12 Balsam of [place] (Myroxylon Pereirae Resin) - -    13 Nickel Sulphate Hexahydrate - -    14 Mixed Dialkyl Thiourea - -    15 Paraben Mix [B] - -    16 Methyldibromo Glutaronitrile - -    17 Fragrance Mix - -    18 2-Bromo-2-Nitropropane-1,3-Diol (Bronopol) NA NA    19 Lyral - -    20 Tixocortol-21- Pivalate - -    21 Diazolidiyl Urea (Germall II) - -    22 Methyl Methacrylate - -    23 Cobalt (II) Chloride Hexahydrate - -    24 Fragrance Mix II  - -    25 Compositae Mix - -    26 Benzoyl Peroxide - -    27 Bacitracin - -    28 Formaldehyde - -    29 Methylchloroisothiazolinone / Methylisothiazolinone - -    30 Corticosteroid Mix NA NA    31 Sodium Lauryl Sulfate - -    32 Lanolin Alcohol - -    33 Turpentine - -    34 Cetylstearylalcohol - -    35 Chlorhexidine Dicluconate - -    36 Budenoside - -    37 Imidazolidinyl Urea  - -    38 Ethyl-2 Cyanoacrylate - -    39 Quaternium 15 (Dowicil 200) - -    40 Decyl Glucoside - -      COLORANTS, DYES, FOOD ADDITIVES   No Substance 2 days 4 days remarks   41 1 Aniline - -    42 2 Keyshawn Brown R - -    43 3 Textile Dye Mix [A] - -    44 4 Gurley  - -    45 5 Disperse Blue-3 - -    46 6 Disperse Kern-3 - -    47 7 Disperse Red-11 NA NA    48 8 Disperse Yellow-9 - -    49 9 Erythrosine-B - -    50 10 Naphthol AS - -       Food additives      51 11 Aspartame - -    52 12 Azorubine - -    53 13 Brilliant Black - -    54 14 Cochineal Red - -    55 15 Patent Blue-VF NA NA    56 16 Pectin - -    57 17 Quinoline Yellow - -    58 18 Saccharin - -    59 19 Tartrazine - -    60 20 Sodium Glutamate NA NA      EMULSIFIERS & ADDITIVES   No Substance 2 days 4 days remarks   61 1 Polyethylene Glycol-400 NA NA    62 2 Cocamidopropyl Betaine - -    63 3 Amerchol L101 - -    64 4  Propylene Glycol - -    65 5 Triethanolamine - -    66 6 Sorbitane Sesquiolate - -    67 7 Isopropylmyristate - -    68 8 Polysorbate 80  - -    69 9 Amidoamine   (Stearamidopropyl Dimethylamine) - -    70 10 Oleamidopropyl Dimethylamine - -    71 11 Lauryl Glucoside NA NA    72 12 Coconut Diethanolamide  - -    73 13 2-Hydroxy-4-Methoxy Benzophenone (Oxybenzone) - -    74 14 Benzophenone-4 (Sulisobenzon) - -    75 15 Propolis NA NA    76 16 Dexpanthenol - -    77 17 Carboxymethyl Cellulose Sodium - -    78 18 Abitol - -    79 19 Tert-Butylhydroquinone - -    80 20 Benzyl Salicylate - -      Antioxidant      81 21 Dodecyl Gallate - -    82 22 Butylhydroxyanisole (BHA) - -    83 23 Butylhydroxytoluene (BHT) - -    84 24 Di-Alpha-Tocopherol (Vit E) - -    85 25 Propyl Gallate - -    86 26 Zinc Pyrithione NA NA    87 27 Dimethylaminopropylamin (DMAPA)        PRESERVATIVES & ANTIMICROBIALS     No Substance 2 days 4 days remarks   88 1  1,2-Benzisothiazoline-3-One, Sodium Salt - -    89 2  1,3,5-Tano (2-Hydroxyethyl) - Hexahydrotriazine (Grotan BK) - -    90 3 7-Xecxaowcejzwu-9-Nitro-1, 3-Propanediol - -    91 4  3, 4, 4' - Triclocarban - -    92 5 4 - Chloro - 3 - Cresol - -    93 6 4 - Chloro - 4 - Xylenol (PCMX) - -    94 7 7-Ethylbicyclooxazolidine (Bioban IL4733) - -    95 8 Benzalkonium Chloride NA NA    96 9 Benzyl Alcohol NA NA    97 10 Cetalkonium Chloride - -    98 11 Cetylpyrimidine Chloride  - -    99 12 Chloroacetamide - -    100 13 DMDM Hydantoin NA NA    101 14 Glutaraldehyde - -    102 15 Triclosan - -    103 16 Glyoxal Trimeric Dihydrate - -    104 17 Iodopropynyl Butylcarbamate - -    105 18 Octylisothiazoline - -    106 19 Iodoform NA NA    107 20 (Nitrobutyl) Morpholine/(Ethylnitro-Trimethylene) Dimorpholine (Bioban P 1487) - -    108 21 Phenoxyethanol - -    109 22 Phenyl Salicylate - -    110 23 Povidone Iodine - -    111 24 Sodium Benzoate - -    112 25 Sodium Disulfite - -    113 26 Sorbic Acid -  -    114 27 Thimerosal - -    115 28 Melamine Formeladyde Resin      116 29 Ethylenediamine Dihydrochloride        Parabens      117 30 Butyl-P-Hydroxybenzoate - -    118 31 Ethyl-P-Hydroxybenzoate - -    119 32 Methyl-P-Hydroxybenzoate - -    120 33 Propyl-P-Hydroxybenzoate - -      PERFUMES, FLAVORS & PLANTS      No Substance 2 days 4 days remarks   121 1 Benzyl Cinnamate - -    122 2 Di-Limonene (Dipentene) - -    123 3 Cananga Odorata (Niki Prince) (I) - -    124 4 Lichen Acid Mix - -    125 5 Mentha Piperita Oil (Peppermint Oil) - -    126 6 Sesquiterpenelactone mix - -    127 7 Tea Tree Oil, Oxidized - -    128 8 Wood Tar Mix - -    129 9 Abietic Acid - -    130 10 Lavendula Angustifolia Oil (Lavender Oil) - -    131 11 Camphor  NA NA      Fragrance Mix I      132 12 Oakmoss Absolute - -    133 13 Eugenol - -    134 14 Geraniol - -    135 15 Hydroxycitronellal - -    136 16 Isoeugenol - -    137 17 Cinnamic Aldehyde - -    138 18 Cinnamic Alcohol  - -      Fragrance mix II      139 19 Citronellol - -    140 20 Alpha-Hexylcinnamic Aldehyde    - -    141 21 Citral - -    142 22 Farnesol - -    143 23 Coumarin - -      ANTIBIOTICS & ANTIMYCOTICS    No Substance 2 days 4 days remarks   144 1 Erythromycine - -    145 2 Framycetine Sulphate - -    146 3 Fusidic Acid Sodium Salt - -    147 4 Gentamicin Sulphate - -    148 5 Neomycine Sulphate - -    149 6 Oxytetracycline  - -    150 7 Polymyxin B Sulphate - -    151 8 Tetracycline-HCL - -    152 9 Sulfanilamide - -    153 10 Metronidazole - -    154 11 Oxyquinoline Mix - -    155 12 Nitrofurazone - -    156 13 Nystatin - -    157 14 Clotrimazole - -      OTHER PRODUCTS      No Substance Conc  % solv 2 days 4 days remarks    1          2          3          4          5          6          7          8          9          10           Results of patch tests:                         Interpretation:    - Negative                    A    =  Allergic      (+) Erythema    TI   = Toxic/irritant   + E + Infiltration    RaP = Relevance at Present     ++ E/I + Papulovesicle   Rpr  = Relevance Previously     +++ E/I/P + Blister     nR   = No Relevance      Atopy Screen (Placed 03/01/21 )    No Substance Readings (15 min) Evaluation   POS Histamine 1mg/ml +    NEG NaCl 0.9% -      No Substance Readings (15 min) Evaluation   1 Alternaria alternata (tenuis)  -    2 Cladosporium herbarum -    3 Aspergillus fumigatus -    4 Penicillium notatum -    5 Dermatophagoides pteronyssinus -    6 Dermatophagoides farinae -    7 Dog epithelium (canis spp) -    8 Cat hair (crow catus) -    9 Cockroach   (Blatella americana & germanica) -    10 Grass mix midwest   ([place], Orchard, Redtop, Eber) -    11 Alonso grass (sorghum halepense) -    12 Weed mix   (common Cocklebur, Lamb s quarters, rough redroot Pigweed, Dock/Sorrel) -    13 Mug wort (artemisia vulgare) -    14 Ragweed giant/short (ambrosia spp) -    15 English Plantain (plantago lanceolata) -    16 Tree mix 1 (Pecan, Maple BHR, Oak RVW, american [place], black Liberty) -    17 Red cedar (juniperus virginia) -    18 Tree mix 2   (white Joselito, river/red Birch, black Catron, common Charlotte, american Elm) -    19 Box elder/Maple mix (acer spp) -    20 Bradford shagbark (carya ovata) -       -    Conclusion: no signs for positive prick tests    Intradermal Testing (Placed 03/01/21 )    No Substance Conc.  Readings (15min) Evaluation   + Histamine (prick) 0.1mg / ml +    DF Standard Dust Mite - D. Farinae 1:10 + 10mmP/E   DP Standard Dust Mite - D. Pteronyssinus 1:10 + 10mmP/E   A Aspergillus fumigatus  1:10 neg    P Penicillium notatum 1:10 neg 5mmP     Conclusion:  Slight immediate type reaction to house dust mites. Will see delayed reactions    [] No relevant allergic reaction observed    [] Allergic reaction diagnosed against following allergens:      Interpretation/ remarks:   See later    [] Patient information  given   [] ACDS CAMP information's (# ....) to following compounds: .....   [] General information's to following compounds: ......    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    ==> Treatment prescribed/Plan:  See later      Follow-up: in Derm-Allergy clinic for 1st readings of patch tests after 2 days (virtual) and 2nd readings and final conclusions after 4 days (in person)      Patient counseling: Stop all antihistamines 1 week before tests      Thank you for the opportunity be involved in the care of this patient.     Staff: Reema Perry and Alycia Pizarro RN    I spent a total of 24 minutes face to face with Eric Espinosa during today s office visit. Over 50% of this time was spent discussing all the individual test results, correlating them to the clinical relevance, counseling the patient and/or coordinating care. Please see Assessment and Plan for details.  This excludes any time spent performing prick, intradermal and patch tests      Again, thank you for allowing me to participate in the care of your patient.        Sincerely,        Rob Zendejas MD

## 2021-03-02 DIAGNOSIS — N39.0 RECURRENT UTI: Primary | ICD-10-CM

## 2021-03-02 DIAGNOSIS — N34.2 INFECTIVE URETHRITIS: Primary | ICD-10-CM

## 2021-03-02 LAB
BACTERIA SPEC CULT: ABNORMAL
Lab: ABNORMAL
SPECIMEN SOURCE: ABNORMAL

## 2021-03-02 RX ORDER — CEPHALEXIN 500 MG/1
500 CAPSULE ORAL 2 TIMES DAILY
Qty: 14 CAPSULE | Refills: 0 | Status: SHIPPED | OUTPATIENT
Start: 2021-03-02 | End: 2021-03-09

## 2021-03-03 ENCOUNTER — VIRTUAL VISIT (OUTPATIENT)
Dept: ALLERGY | Facility: CLINIC | Age: 66
End: 2021-03-03
Payer: COMMERCIAL

## 2021-03-03 DIAGNOSIS — N48.1 BALANITIS: ICD-10-CM

## 2021-03-03 DIAGNOSIS — J02.9 ALLERGIC PHARYNGITIS: ICD-10-CM

## 2021-03-03 DIAGNOSIS — L23.89 ALLERGIC CONTACT DERMATITIS DUE TO OTHER AGENTS: Primary | ICD-10-CM

## 2021-03-03 PROCEDURE — 99213 OFFICE O/P EST LOW 20 MIN: CPT | Mod: 95 | Performed by: DERMATOLOGY

## 2021-03-03 NOTE — PROGRESS NOTES
Eric is a 65 year old who is being evaluated via a billable video visit.      How would you like to obtain your AVS? MyChart  If the video visit is dropped, the invitation should be resent by: Text to cell phone: [phone]  Will anyone else be joining your video visit? No      Alycia Crooks RN

## 2021-03-03 NOTE — PROGRESS NOTES
Note for return visit for Dermato-Allergy       Encounter Date: Mar 3, 2021    History of Present Illness:  I have reviewed the teledermatology  information and the nursing intake corresponding to this issue. Eric Espinosa is a 65 year old male who presents as a teledermatology consult for the following information take directly from the prior notes and video call performed by myself:     Patient in Derm Allergy clinic for 1st readings of patch tests after 2 days (virtual) and pictures in Epic media    Additional comments and observations from review of the patient s chart including the following:    See notes    ROS: Patient generally feeling well today   Physical Examination:  General: Well-appearing, appropriately-developed individual.  - Skin: Examination of the skin on test sites within the teledermatology was performed.   See test results below  As above in HPI. No additional skin concerns.  - upper respiratory tract: currently no obvious Rhinitis  - lungs: no signs for active and present Asthma/Wheezing or coughing  - eyes: currently no active conjunctivits  - GI system/digestion: currently no problems, no bloating or Diarrhoea      Allergy Problem List:    Specialty Problems        Allergy Diagnoses    Psoriasis        Dermatitis, seborrheic            Earlier History and Allergy exams:    Patient has been on several ENTs (Dr. Cote)with constant sore throat and slight ethmoid sinusitis, no acid reflux --> started about 10 months ago  In addition patient had Balanitis and dermatitis on scalp and trunk/shoulders. On the shoulders based on pictures from September 2020 look like localized seropapules = maybe ectoparasitosis = Chiggers?    >> patient started about 10 years ago Atorvastatin and did stop it in April.       Patient was patient of Dr. Fournier in November 2021:     1. History of NMSC  -BCC, nose, s/p Mohs surgery 2015   -BCC, L superior shoulder ([facility] 2013)  -BCC, forehead (2012 w/   Pradeep in [place], [place]).   2. Actinic keratoses, forehead s/p cryo   3. Hemangioma L shoudler s/p excision 4/26/2016 (suspected lymph node)  4. Benign Bx  - IDN, central forehead, s/p shave bx 11/1/19  5 Seborrheic dermatitis  - patch on left temporal scalp with predominant scale  - Current tx: Alternate Selsan blue and head & shoulders for shampooing, lidex solution as needed (discussed 10/19/18)  6. Balanitis  - Current Tx: hydrocortisone 2.5% ointment BID PRN      Past Medical History:   Patient Active Problem List   Diagnosis     Psoriasis     Neoplasm of uncertain behavior of skin     AK (actinic keratosis)     BCC (basal cell carcinoma), trunk     Actinic keratosis     History of skin cancer     Diverticulitis     History of basal cell cancer     Dermal nevus     Dermatitis, seborrheic     Past Medical History:   Diagnosis Date     Arthritis      Autoimmune disease (H)      Basal cell carcinoma      Chronic tonsillitis      Hoarseness      Skin cancer        Allergy History:     Allergies   Allergen Reactions     Ciprofloxacin Rash       Social History:  The patient is retired. Patient has the following hobbies or non-occupational exposure      reports that he has never smoked. He has never used smokeless tobacco. He reports current alcohol use. He reports that he does not use drugs.      Family History:  Family History   Problem Relation Age of Onset     Diabetes Father      Heart Disease Father      Hypertension Mother      Cancer No family hx of         No family history of skin cancer     Skin Cancer No family hx of      Glaucoma No family hx of      Macular Degeneration No family hx of        Medications:  Current Outpatient Medications   Medication Sig Dispense Refill     ARTIFICIAL TEAR OP Apply 1 drop to eye as needed       atorvastatin (LIPITOR) 20 MG tablet Take 1 tablet (20 mg) by mouth daily (Patient not taking: Reported on 10/10/2020) 90 tablet 3     cephALEXin (KEFLEX) 500 MG capsule Take 1  capsule (500 mg) by mouth 2 times daily for 7 days 14 capsule 0     fluocinonide (LIDEX) 0.05 % solution Apply topically 2 times daily (Patient not taking: Reported on 10/10/2020) 60 mL 1     hydrocortisone 2.5 % ointment Apply topically 2 times daily To inflammation on penis 30 g 11     ibuprofen (ADVIL,MOTRIN) 200 MG tablet Take 200 mg by mouth every 4 hours as needed       MELATONIN PO Take 5 mg by mouth At Bedtime        tamsulosin (FLOMAX) 0.4 MG capsule Take 1 capsule (0.4 mg) by mouth daily 90 capsule 3     zaleplon (SONATA) 5 MG capsule Take 1 capsule (5 mg) by mouth nightly as needed for sleep 15 capsule 0     zolpidem (AMBIEN) 5 MG tablet Take 1 tablet (5 mg) by mouth nightly as needed for sleep (Patient not taking: Reported on 10/10/2020) 30 tablet 1       Allergy tests:    Past Allergy tests      Current Allergy tests (or planned)      Order for PATCH TESTS    [x] Outpatient  [] Inpatient: [facility]..../ Bed ....      Skin Atopy (atopic dermatitis) [] Yes   [x] No  Rhinitis/Sinusitis:   [x] Yes   [] No  Allergic Asthma:   [] Yes   [x] No  Food Allergy:   [] Yes   [x] No  Leg ulcers:   [] Yes   [x] No  Hand eczema:   [] Yes   [x] No   Leading hand:   [] R   [] L       [] Ambidextrous                        Reason for tests (suspected allergy): recurrent dermatitis on scalp and glans penis and maybe oral cavity  Known previous allergies: none    Standardized panels  [x] Standard panel (38 tests)  [x] Preservatives & Antimicrobials (27 tests)  [x] Emulsifiers & Additives (21 tests)   [x] Perfumes/Flavours & Plants (24 tests)  [] Hairdresser panel (12 tests)  [] Rubber Chemicals (22 tests)  [] Plastics (26 tests)  [x] Colorants/Dyes/Food additives (17 tests)  [] Metals (implants/dental) (24 tests)  [] Local anaesthetics/NSAIDs (14 tests)  [x] Antibiotics & Antimycotics (14 tests)   [] Corticosteroids (15 tests)   [] Photopatch test (62 tests)   [] others: ...      [] Patient's own products: ...    DO NOT test if  chemical or biological identity is unknown!     always ask from patient the product information and safety sheets (MSDS)     Order for PRICK TESTS    [x] Outpatient  [] Inpatient: [facility]..../ Bed ....      Skin Atopy (atopic dermatitis) [] Yes   [x] No  Contact allergies:   [x] Yes   [] No  Urticaria/Angioedema  [] Yes   [x] No  Rhinitis/Sinusitis:   [] Yes   [x] No recently some recurrent Sinusitis with yellow exudate   [] seasonal [] perennial            Allergic Asthma:   [] Yes   [x] No  Pets :  [x] Yes   [] No  Which? Had dogs for 20 years (last one  2 weeks ago)  Food Allergy:   [x] Yes   [x] No  Drug allergies:    X Yes   [] No Ciprofloxacine rash on chest    Reason for tests (suspected allergy): chronic pharyngitis with recurrent Sinusitis  Known previous allergies: none    Standardized prick panels  [x] Atopic panel (20 tests)  [] Pediatric Panel (12 tests)  [] Milk, Meat, Eggs, Grains (20 tests)   [] Dust, Epithelia, Feathers (10 tests)  [] Fish, Seafood, Shellfish (17 tests)  [] Nuts, Beans (14 tests)  [] Spice, Vegetable, Fruit (17 tests)  [] Others: ...      [] Patient's own products: ...    DO NOT test if chemical or biological identity is unknown!     always ask from patient the product information and safety sheets (MSDS)     Standardized intradermal tests  [x] Penicillium notatum [x] Aspergillus fumigatus [x] House dust mites  [] Bee venom   [] Wasp venom !!Specific protocol with dilutions!!  [] Others: ...    RESULTS & EVALUATION of PATCH TESTS    Patch test readings after     [x] 2 days, [] 3 days [x] 4 days, [] 5 days,    Applied patch tests with results (2021 applied patch tests):    STANDARD Series      No Substance 2 days 4 days remarks   1 Sumit Mix [C] - -    2 Colophony - -    3  2-Mercaptobenzothiazole  - -     4 Methylisothiazolinone - -    5 Carba Mix - -    6 Thiuram Mix [A] - -    7 Bisphenol A Epoxy Resin - -    8 P-Rtyq-Ekxzkqbbdzj-Formaldehyde Resin - -    9 Mercapto Mix [A]  - -    10 Black Rubber Mix- PPD [B] - -    11 Potassium Dichromate  -  -    12 Balsam of [place] (Myroxylon Pereirae Resin) - -    13 Nickel Sulphate Hexahydrate - -    14 Mixed Dialkyl Thiourea - -    15 Paraben Mix [B] - -    16 Methyldibromo Glutaronitrile - -    17 Fragrance Mix (+) -    18 2-Bromo-2-Nitropropane-1,3-Diol (Bronopol) NA NA    19 Lyral - -    20 Tixocortol-21- Pivalate - -    21 Diazolidiyl Urea (Germall II) - -    22 Methyl Methacrylate - -    23 Cobalt (II) Chloride Hexahydrate - -    24 Fragrance Mix II  - -    25 Compositae Mix - -    26 Benzoyl Peroxide - -    27 Bacitracin - -    28 Formaldehyde - -    29 Methylchloroisothiazolinone / Methylisothiazolinone - -    30 Corticosteroid Mix NA NA    31 Sodium Lauryl Sulfate - -    32 Lanolin Alcohol - -    33 Turpentine - -    34 Cetylstearylalcohol - -    35 Chlorhexidine Dicluconate - -    36 Budenoside - -    37 Imidazolidinyl Urea  - -    38 Ethyl-2 Cyanoacrylate - -    39 Quaternium 15 (Dowicil 200) - -    40 Decyl Glucoside - -      COLORANTS, DYES, FOOD ADDITIVES   No Substance 2 days 4 days remarks   41 1 Aniline - -    42 2 Keyshawn Brown R - -    43 3 Textile Dye Mix [A] - -    44 4 Leighton  - -    45 5 Disperse Blue-3 - -    46 6 Disperse Wharton-3 - -    47 7 Disperse Red-11 NA NA    48 8 Disperse Yellow-9 - -    49 9 Erythrosine-B - -    50 10 Naphthol AS - -       Food additives      51 11 Aspartame - -    52 12 Azorubine - -    53 13 Brilliant Black - -    54 14 Cochineal Red - -    55 15 Patent Blue-VF NA NA    56 16 Pectin - -    57 17 Quinoline Yellow - -    58 18 Saccharin - -    59 19 Tartrazine - -    60 20 Sodium Glutamate NA NA      EMULSIFIERS & ADDITIVES   No Substance 2 days 4 days remarks   61 1 Polyethylene Glycol-400 NA NA    62 2 Cocamidopropyl Betaine - -    63 3 Amerchol L101 - -    64 4 Propylene Glycol - -    65 5 Triethanolamine - -    66 6 Sorbitane Sesquiolate - -    67 7 Isopropylmyristate - -    68 8 Polysorbate  80  - -    69 9 Amidoamine   (Stearamidopropyl Dimethylamine) - -    70 10 Oleamidopropyl Dimethylamine - -    71 11 Lauryl Glucoside NA NA    72 12 Coconut Diethanolamide  - -    73 13 2-Hydroxy-4-Methoxy Benzophenone (Oxybenzone) - -    74 14 Benzophenone-4 (Sulisobenzon) - -    75 15 Propolis NA NA    76 16 Dexpanthenol - -    77 17 Carboxymethyl Cellulose Sodium - -    78 18 Abitol - -    79 19 Tert-Butylhydroquinone - -    80 20 Benzyl Salicylate - -      Antioxidant      81 21 Dodecyl Gallate - -    82 22 Butylhydroxyanisole (BHA) - -    83 23 Butylhydroxytoluene (BHT) - -    84 24 Di-Alpha-Tocopherol (Vit E) - -    85 25 Propyl Gallate - -    86 26 Zinc Pyrithione NA NA    87 27 Dimethylaminopropylamin (DMAPA)        PRESERVATIVES & ANTIMICROBIALS     No Substance 2 days 4 days remarks   88 1  1,2-Benzisothiazoline-3-One, Sodium Salt - -    89 2  1,3,5-Tano (2-Hydroxyethyl) - Hexahydrotriazine (Grotan BK) - -    90 3 4-Tbgrvxgvppqdl-5-Nitro-1, 3-Propanediol - -    91 4  3, 4, 4' - Triclocarban - -    92 5 4 - Chloro - 3 - Cresol - -    93 6 4 - Chloro - 4 - Xylenol (PCMX) - -    94 7 7-Ethylbicyclooxazolidine (Bioban KT4117) - -    95 8 Benzalkonium Chloride NA NA    96 9 Benzyl Alcohol NA NA    97 10 Cetalkonium Chloride - -    98 11 Cetylpyrimidine Chloride  - -    99 12 Chloroacetamide - -    100 13 DMDM Hydantoin NA NA    101 14 Glutaraldehyde - -    102 15 Triclosan - -    103 16 Glyoxal Trimeric Dihydrate - -    104 17 Iodopropynyl Butylcarbamate - -    105 18 Octylisothiazoline - -    106 19 Iodoform NA NA    107 20 (Nitrobutyl) Morpholine/(Ethylnitro-Trimethylene) Dimorpholine (Bioban P 1487) - -    108 21 Phenoxyethanol - -    109 22 Phenyl Salicylate - -    110 23 Povidone Iodine - -    111 24 Sodium Benzoate - -    112 25 Sodium Disulfite - -    113 26 Sorbic Acid - -    114 27 Thimerosal - -    115 28 Melamine Formeladyde Resin      116 29 Ethylenediamine Dihydrochloride        Parabens      117  30 Butyl-P-Hydroxybenzoate - -    118 31 Ethyl-P-Hydroxybenzoate - -    119 32 Methyl-P-Hydroxybenzoate - -    120 33 Propyl-P-Hydroxybenzoate - -      PERFUMES, FLAVORS & PLANTS      No Substance 2 days 4 days remarks   121 1 Benzyl Cinnamate - -    122 2 Di-Limonene (Dipentene) - -    123 3 Cananga Odorata (Niki Prince) (I) - -    124 4 Lichen Acid Mix - -    125 5 Mentha Piperita Oil (Peppermint Oil) - -    126 6 Sesquiterpenelactone mix - -    127 7 Tea Tree Oil, Oxidized - -    128 8 Wood Tar Mix - -    129 9 Abietic Acid - -    130 10 Lavendula Angustifolia Oil (Lavender Oil) - -    131 11 Camphor  NA NA      Fragrance Mix I      132 12 [place]moss Absolute - -    133 13 Eugenol - -    134 14 Geraniol - -    135 15 Hydroxycitronellal - -    136 16 Isoeugenol - -    137 17 Cinnamic Aldehyde - -    138 18 Cinnamic Alcohol  - -      Fragrance mix II      139 19 Citronellol - -    140 20 Alpha-Hexylcinnamic Aldehyde    - -    141 21 Citral - -    142 22 Farnesol - -    143 23 Coumarin - -      ANTIBIOTICS & ANTIMYCOTICS    No Substance 2 days 4 days remarks   144 1 Erythromycine - -    145 2 Framycetine Sulphate - -    146 3 Fusidic Acid Sodium Salt - -    147 4 Gentamicin Sulphate - -    148 5 Neomycine Sulphate - -    149 6 Oxytetracycline  - -    150 7 Polymyxin B Sulphate - -    151 8 Tetracycline-HCL - -    152 9 Sulfanilamide - -    153 10 Metronidazole - -    154 11 Oxyquinoline Mix - -    155 12 Nitrofurazone - -    156 13 Nystatin - -    157 14 Clotrimazole - -      OTHER PRODUCTS      No Substance Conc  % solv 2 days 4 days remarks    1          2          3          4          5          6          7          8          9          10           Results of patch tests:                         Interpretation:    - Negative                    A    = Allergic      (+) Erythema    TI   = Toxic/irritant   + E + Infiltration    RaP = Relevance at Present     ++ E/I + Papulovesicle   Rpr  = Relevance  Previously     +++ E/I/P + Blister     nR   = No Relevance      Atopy Screen (Placed 03/01/21 )    No Substance Readings (15 min) Evaluation   POS Histamine 1mg/ml +    NEG NaCl 0.9% -      No Substance Readings (15 min) Evaluation   1 Alternaria alternata (tenuis)  -    2 Cladosporium herbarum -    3 Aspergillus fumigatus -    4 Penicillium notatum -    5 Dermatophagoides pteronyssinus -    6 Dermatophagoides farinae -    7 Dog epithelium (canis spp) -    8 Cat hair (crow catus) -    9 Cockroach   (Blatella americana & germanica) -    10 Grass mix midwest   ([place], [place], [place], [place]) -    11 Alonso grass (sorghum halepense) -    12 Weed mix   (common Cocklebur, Lamb s quarters, rough redroot Pigweed, Dock/Sorrel) -    13 Mug wort (artemisia vulgare) -    14 Ragweed giant/short (ambrosia spp) -    15 English Plantain (plantago lanceolata) -    16 Tree mix 1 (Pecan, Maple BHR, Oak RVW, american [place], black [place]) -    17 Red cedar (juniperus virginia) -    18 Tree mix 2   (white [place], river/red Birch, black [place], common Pope, american Elm) -    19 Box elder/Maple mix (acer spp) -    20 [place] shagbark (carya ovata) -       -    Conclusion: no signs for positive prick tests    Intradermal Testing (Placed 03/01/21 )    No Substance Conc.  Readings (15min) Evaluation   + Histamine (prick) 0.1mg / ml +    DF Standard Dust Mite - D. Farinae 1:10 + 10mmP/E   DP Standard Dust Mite - D. Pteronyssinus 1:10 + 10mmP/E   A Aspergillus fumigatus  1:10 neg    P Penicillium notatum 1:10 neg 5mmP     Conclusion:  Slight immediate type reaction to house dust mites. No delayed reaction after 2 days on IDT.     [x] No relevant allergic reaction observed    [] Allergic reaction diagnosed against following allergens:      Interpretation/ remarks:   See later    [] Patient information given   [] ACDS CAMP information's (# ....) to following compounds: .....   [] General information's to following compounds:  ......    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    ==> Treatment prescribed/Plan:  See later      Follow-up: in Derm-Allergy clinic for 2nd readings and final conclusions after 4 days (in person)      Patient counseling: Stop all antihistamines 1 week before tests      Thank you for the opportunity be involved in the care of this patient.     Staff:  Alycia Pizarro RN  ___________________________________________________________________________    Teledermatology information:  - Location of patient: Home  - Patient presented in referral from:  other  - Location of teledermatologist:  [facility] [place] (, [place], [place])  - Reason teledermatology is appropriate:  of National Emergency Regarding Coronavirus disease (COVID 19) Outbreak  - Method of transmission:  Store and Forward and Telephone Doximity and pictures in Epic media.   - Date of images: in Epic media  - Service start time:2:15pm  - Service end time: 2:35pm  - Date of report: 03/03/21    I spent a total of 20 minutes for telemedicine consult with Eric Espinosa during today s video meeting. Over 50% of this time was spent counseling the patient and/or coordinating care. Please see Assessment and Plan for details.

## 2021-03-03 NOTE — LETTER
3/3/2021         RE: Eric Espinosa  [address]   [facility] [address]        Dear Colleague,    Thank you for referring your patient, Eric Espinosa, to the [facility] [place]. Please see a copy of my visit note below.    Note for return visit for Dermato-Allergy       Encounter Date: Mar 3, 2021    History of Present Illness:  I have reviewed the teledermatology  information and the nursing intake corresponding to this issue. Eric Espinosa is a 65 year old male who presents as a teledermatology consult for the following information take directly from the prior notes and video call performed by myself:     Patient in Derm Allergy clinic for 1st readings of patch tests after 2 days (virtual) and pictures in Epic media    Additional comments and observations from review of the patient s chart including the following:    See notes    ROS: Patient generally feeling well today   Physical Examination:  General: Well-appearing, appropriately-developed individual.  - Skin: Examination of the skin on test sites within the teledermatology was performed.   See test results below  As above in HPI. No additional skin concerns.  - upper respiratory tract: currently no obvious Rhinitis  - lungs: no signs for active and present Asthma/Wheezing or coughing  - eyes: currently no active conjunctivits  - GI system/digestion: currently no problems, no bloating or Diarrhoea      Allergy Problem List:    Specialty Problems        Allergy Diagnoses    Psoriasis        Dermatitis, seborrheic            Earlier History and Allergy exams:    Patient has been on several ENTs (Dr. Cote)with constant sore throat and slight ethmoid sinusitis, no acid reflux --> started about 10 months ago  In addition patient had Balanitis and dermatitis on scalp and trunk/shoulders. On the shoulders based on pictures from September 2020 look like localized seropapules = maybe ectoparasitosis = Chiggers?    >> patient  started about 10 years ago Atorvastatin and did stop it in April.       Patient was patient of Dr. Fournier in November 2021:     1. History of NMSC  -BCC, nose, s/p Mohs surgery 2015   -BCC, L superior shoulder ([facility] 2013)  -BCC, forehead (2012 w/ Dr. Fernández in [place], [place]).   2. Actinic keratoses, forehead s/p cryo   3. Hemangioma L shoudler s/p excision 4/26/2016 (suspected lymph node)  4. Benign Bx  - IDN, central forehead, s/p shave bx 11/1/19  5 Seborrheic dermatitis  - patch on left temporal scalp with predominant scale  - Current tx: Alternate Selsan blue and head & shoulders for shampooing, lidex solution as needed (discussed 10/19/18)  6. Balanitis  - Current Tx: hydrocortisone 2.5% ointment BID PRN      Past Medical History:   Patient Active Problem List   Diagnosis     Psoriasis     Neoplasm of uncertain behavior of skin     AK (actinic keratosis)     BCC (basal cell carcinoma), trunk     Actinic keratosis     History of skin cancer     Diverticulitis     History of basal cell cancer     Dermal nevus     Dermatitis, seborrheic     Past Medical History:   Diagnosis Date     Arthritis      Autoimmune disease (H)      Basal cell carcinoma      Chronic tonsillitis      Hoarseness      Skin cancer        Allergy History:     Allergies   Allergen Reactions     Ciprofloxacin Rash       Social History:  The patient is retired. Patient has the following hobbies or non-occupational exposure      reports that he has never smoked. He has never used smokeless tobacco. He reports current alcohol use. He reports that he does not use drugs.      Family History:  Family History   Problem Relation Age of Onset     Diabetes Father      Heart Disease Father      Hypertension Mother      Cancer No family hx of         No family history of skin cancer     Skin Cancer No family hx of      Glaucoma No family hx of      Macular Degeneration No family hx of        Medications:  Current Outpatient Medications   Medication  Sig Dispense Refill     ARTIFICIAL TEAR OP Apply 1 drop to eye as needed       atorvastatin (LIPITOR) 20 MG tablet Take 1 tablet (20 mg) by mouth daily (Patient not taking: Reported on 10/10/2020) 90 tablet 3     cephALEXin (KEFLEX) 500 MG capsule Take 1 capsule (500 mg) by mouth 2 times daily for 7 days 14 capsule 0     fluocinonide (LIDEX) 0.05 % solution Apply topically 2 times daily (Patient not taking: Reported on 10/10/2020) 60 mL 1     hydrocortisone 2.5 % ointment Apply topically 2 times daily To inflammation on penis 30 g 11     ibuprofen (ADVIL,MOTRIN) 200 MG tablet Take 200 mg by mouth every 4 hours as needed       MELATONIN PO Take 5 mg by mouth At Bedtime        tamsulosin (FLOMAX) 0.4 MG capsule Take 1 capsule (0.4 mg) by mouth daily 90 capsule 3     zaleplon (SONATA) 5 MG capsule Take 1 capsule (5 mg) by mouth nightly as needed for sleep 15 capsule 0     zolpidem (AMBIEN) 5 MG tablet Take 1 tablet (5 mg) by mouth nightly as needed for sleep (Patient not taking: Reported on 10/10/2020) 30 tablet 1       Allergy tests:    Past Allergy tests      Current Allergy tests (or planned)      Order for PATCH TESTS    [x] Outpatient  [] Inpatient: Xiao..../ Bed ....      Skin Atopy (atopic dermatitis) [] Yes   [x] No  Rhinitis/Sinusitis:   [x] Yes   [] No  Allergic Asthma:   [] Yes   [x] No  Food Allergy:   [] Yes   [x] No  Leg ulcers:   [] Yes   [x] No  Hand eczema:   [] Yes   [x] No   Leading hand:   [] R   [] L       [] Ambidextrous                        Reason for tests (suspected allergy): recurrent dermatitis on scalp and glans penis and maybe oral cavity  Known previous allergies: none    Standardized panels  [x] Standard panel (38 tests)  [x] Preservatives & Antimicrobials (27 tests)  [x] Emulsifiers & Additives (21 tests)   [x] Perfumes/Flavours & Plants (24 tests)  [] Hairdresser panel (12 tests)  [] Rubber Chemicals (22 tests)  [] Plastics (26 tests)  [x] Colorants/Dyes/Food additives (17 tests)  []  Metals (implants/dental) (24 tests)  [] Local anaesthetics/NSAIDs (14 tests)  [x] Antibiotics & Antimycotics (14 tests)   [] Corticosteroids (15 tests)   [] Photopatch test (62 tests)   [] others: ...      [] Patient's own products: ...    DO NOT test if chemical or biological identity is unknown!     always ask from patient the product information and safety sheets (MSDS)     Order for PRICK TESTS    [x] Outpatient  [] Inpatient: Xiao..../ Bed ....      Skin Atopy (atopic dermatitis) [] Yes   [x] No  Contact allergies:   [x] Yes   [] No  Urticaria/Angioedema  [] Yes   [x] No  Rhinitis/Sinusitis:   [] Yes   [x] No recently some recurrent Sinusitis with yellow exudate   [] seasonal [] perennial            Allergic Asthma:   [] Yes   [x] No  Pets :  [x] Yes   [] No  Which? Had dogs for 20 years (last one  2 weeks ago)  Food Allergy:   [x] Yes   [x] No  Drug allergies:    X Yes   [] No Ciprofloxacine rash on chest    Reason for tests (suspected allergy): chronic pharyngitis with recurrent Sinusitis  Known previous allergies: none    Standardized prick panels  [x] Atopic panel (20 tests)  [] Pediatric Panel (12 tests)  [] Milk, Meat, Eggs, Grains (20 tests)   [] Dust, Epithelia, Feathers (10 tests)  [] Fish, Seafood, Shellfish (17 tests)  [] Nuts, Beans (14 tests)  [] Spice, Vegetable, Fruit (17 tests)  [] Others: ...      [] Patient's own products: ...    DO NOT test if chemical or biological identity is unknown!     always ask from patient the product information and safety sheets (MSDS)     Standardized intradermal tests  [x] Penicillium notatum [x] Aspergillus fumigatus [x] House dust mites  [] Bee venom   [] Wasp venom !!Specific protocol with dilutions!!  [] Others: ...    RESULTS & EVALUATION of PATCH TESTS    Patch test readings after     [x] 2 days, [] 3 days [x] 4 days, [] 5 days,    Applied patch tests with results (2021 applied patch tests):    STANDARD Series      No Substance 2 days 4 days  remarks   1 Sumit Mix [C] - -    2 Colophony - -    3  2-Mercaptobenzothiazole  - -     4 Methylisothiazolinone - -    5 Carba Mix - -    6 Thiuram Mix [A] - -    7 Bisphenol A Epoxy Resin - -    8 P-Psxh-Vntpinrnozy-Formaldehyde Resin - -    9 Mercapto Mix [A] - -    10 Black Rubber Mix- PPD [B] - -    11 Potassium Dichromate  -  -    12 Balsam of [place] (Myroxylon Pereirae Resin) - -    13 Nickel Sulphate Hexahydrate - -    14 Mixed Dialkyl Thiourea - -    15 Paraben Mix [B] - -    16 Methyldibromo Glutaronitrile - -    17 Fragrance Mix (+) -    18 2-Bromo-2-Nitropropane-1,3-Diol (Bronopol) NA NA    19 Lyral - -    20 Tixocortol-21- Pivalate - -    21 Diazolidiyl Urea (Germall II) - -    22 Methyl Methacrylate - -    23 Cobalt (II) Chloride Hexahydrate - -    24 Fragrance Mix II  - -    25 Compositae Mix - -    26 Benzoyl Peroxide - -    27 Bacitracin - -    28 Formaldehyde - -    29 Methylchloroisothiazolinone / Methylisothiazolinone - -    30 Corticosteroid Mix NA NA    31 Sodium Lauryl Sulfate - -    32 Lanolin Alcohol - -    33 Turpentine - -    34 Cetylstearylalcohol - -    35 Chlorhexidine Dicluconate - -    36 Budenoside - -    37 Imidazolidinyl Urea  - -    38 Ethyl-2 Cyanoacrylate - -    39 Quaternium 15 (Dowicil 200) - -    40 Decyl Glucoside - -      COLORANTS, DYES, FOOD ADDITIVES   No Substance 2 days 4 days remarks   41 1 Aniline - -    42 2 Keyshawn Brown R - -    43 3 Textile Dye Mix [A] - -    44 4 [place]  - -    45 5 Disperse Blue-3 - -    46 6 Disperse Sophia-3 - -    47 7 Disperse Red-11 NA NA    48 8 Disperse Yellow-9 - -    49 9 Erythrosine-B - -    50 10 Naphthol AS - -       Food additives      51 11 Aspartame - -    52 12 Azorubine - -    53 13 Brilliant Black - -    54 14 Cochineal Red - -    55 15 Patent Blue-VF NA NA    56 16 Pectin - -    57 17 Quinoline Yellow - -    58 18 Saccharin - -    59 19 Tartrazine - -    60 20 Sodium Glutamate NA NA      EMULSIFIERS & ADDITIVES   No Substance 2  days 4 days remarks   61 1 Polyethylene Glycol-400 NA NA    62 2 Cocamidopropyl Betaine - -    63 3 Amerchol L101 - -    64 4 Propylene Glycol - -    65 5 Triethanolamine - -    66 6 Sorbitane Sesquiolate - -    67 7 Isopropylmyristate - -    68 8 Polysorbate 80  - -    69 9 Amidoamine   (Stearamidopropyl Dimethylamine) - -    70 10 Oleamidopropyl Dimethylamine - -    71 11 Lauryl Glucoside NA NA    72 12 Coconut Diethanolamide  - -    73 13 2-Hydroxy-4-Methoxy Benzophenone (Oxybenzone) - -    74 14 Benzophenone-4 (Sulisobenzon) - -    75 15 Propolis NA NA    76 16 Dexpanthenol - -    77 17 Carboxymethyl Cellulose Sodium - -    78 18 Abitol - -    79 19 Tert-Butylhydroquinone - -    80 20 Benzyl Salicylate - -      Antioxidant      81 21 Dodecyl Gallate - -    82 22 Butylhydroxyanisole (BHA) - -    83 23 Butylhydroxytoluene (BHT) - -    84 24 Di-Alpha-Tocopherol (Vit E) - -    85 25 Propyl Gallate - -    86 26 Zinc Pyrithione NA NA    87 27 Dimethylaminopropylamin (DMAPA)        PRESERVATIVES & ANTIMICROBIALS     No Substance 2 days 4 days remarks   88 1  1,2-Benzisothiazoline-3-One, Sodium Salt - -    89 2  1,3,5-Tano (2-Hydroxyethyl) - Hexahydrotriazine (Grotan BK) - -    90 3 5-Somlpwdowfwth-5-Nitro-1, 3-Propanediol - -    91 4  3, 4, 4' - Triclocarban - -    92 5 4 - Chloro - 3 - Cresol - -    93 6 4 - Chloro - 4 - Xylenol (PCMX) - -    94 7 7-Ethylbicyclooxazolidine (Bioban [id number]) - -    95 8 Benzalkonium Chloride NA NA    96 9 Benzyl Alcohol NA NA    97 10 Cetalkonium Chloride - -    98 11 Cetylpyrimidine Chloride  - -    99 12 Chloroacetamide - -    100 13 DMDM Hydantoin NA NA    101 14 Glutaraldehyde - -    102 15 Triclosan - -    103 16 Glyoxal Trimeric Dihydrate - -    104 17 Iodopropynyl Butylcarbamate - -    105 18 Octylisothiazoline - -    106 19 Iodoform NA NA    107 20 (Nitrobutyl) Morpholine/(Ethylnitro-Trimethylene) Dimorpholine (Bioban P 1487) - -    108 21 Phenoxyethanol - -    109 22 Phenyl  Salicylate - -    110 23 Povidone Iodine - -    111 24 Sodium Benzoate - -    112 25 Sodium Disulfite - -    113 26 Sorbic Acid - -    114 27 Thimerosal - -    115 28 Melamine Formeladyde Resin      116 29 Ethylenediamine Dihydrochloride        Parabens      117 30 Butyl-P-Hydroxybenzoate - -    118 31 Ethyl-P-Hydroxybenzoate - -    119 32 Methyl-P-Hydroxybenzoate - -    120 33 Propyl-P-Hydroxybenzoate - -      PERFUMES, FLAVORS & PLANTS      No Substance 2 days 4 days remarks   121 1 Benzyl Cinnamate - -    122 2 Di-Limonene (Dipentene) - -    123 3 Cananga Odorata (Niki Prince) (I) - -    124 4 Lichen Acid Mix - -    125 5 Mentha Piperita Oil (Peppermint Oil) - -    126 6 Sesquiterpenelactone mix - -    127 7 Tea Tree Oil, Oxidized - -    128 8 Wood Tar Mix - -    129 9 Abietic Acid - -    130 10 Lavendula Angustifolia Oil (Lavender Oil) - -    131 11 Camphor  NA NA      Fragrance Mix I      132 12 Oakmoss Absolute - -    133 13 Eugenol - -    134 14 Geraniol - -    135 15 Hydroxycitronellal - -    136 16 Isoeugenol - -    137 17 Cinnamic Aldehyde - -    138 18 Cinnamic Alcohol  - -      Fragrance mix II      139 19 Citronellol - -    140 20 Alpha-Hexylcinnamic Aldehyde    - -    141 21 Citral - -    142 22 Farnesol - -    143 23 Coumarin - -      ANTIBIOTICS & ANTIMYCOTICS    No Substance 2 days 4 days remarks   144 1 Erythromycine - -    145 2 Framycetine Sulphate - -    146 3 Fusidic Acid Sodium Salt - -    147 4 Gentamicin Sulphate - -    148 5 Neomycine Sulphate - -    149 6 Oxytetracycline  - -    150 7 Polymyxin B Sulphate - -    151 8 Tetracycline-HCL - -    152 9 Sulfanilamide - -    153 10 Metronidazole - -    154 11 Oxyquinoline Mix - -    155 12 Nitrofurazone - -    156 13 Nystatin - -    157 14 Clotrimazole - -      OTHER PRODUCTS      No Substance Conc  % solv 2 days 4 days remarks    1          2          3          4          5          6          7          8          9          10            Results of patch tests:                         Interpretation:    - Negative                    A    = Allergic      (+) Erythema    TI   = Toxic/irritant   + E + Infiltration    RaP = Relevance at Present     ++ E/I + Papulovesicle   Rpr  = Relevance Previously     +++ E/I/P + Blister     nR   = No Relevance      Atopy Screen (Placed 03/01/21 )    No Substance Readings (15 min) Evaluation   POS Histamine 1mg/ml +    NEG NaCl 0.9% -      No Substance Readings (15 min) Evaluation   1 Alternaria alternata (tenuis)  -    2 Cladosporium herbarum -    3 Aspergillus fumigatus -    4 Penicillium notatum -    5 Dermatophagoides pteronyssinus -    6 Dermatophagoides farinae -    7 Dog epithelium (canis spp) -    8 Cat hair (crow catus) -    9 Cockroach   (Blatella americana & germanica) -    10 Grass mix midwest   ([place], Orchard, Red[place], Eber) -    11 Alonso grass (sorghum halepense) -    12 Weed mix   (common Cocklebur, Lamb s quarters, rough redroot Pigweed, Dock/Sorrel) -    13 Mug wort (artemisia vulgare) -    14 Ragweed giant/short (ambrosia spp) -    15 English Plantain (plantago lanceolata) -    16 Tree mix 1 (Pecan, Maple BHR, Oak RVW, american [place], black [place]) -    17 Red cedar (juniperus virginia) -    18 Tree mix 2   (white Joselito, river/red Birch, black Bourbon, common [place], american Elm) -    19 Box elder/Maple mix (acer spp) -    20 Parachute shagbark (carya ovata) -       -    Conclusion: no signs for positive prick tests    Intradermal Testing (Placed 03/01/21 )    No Substance Conc.  Readings (15min) Evaluation   + Histamine (prick) 0.1mg / ml +    DF Standard Dust Mite - D. Farinae 1:10 + 10mmP/E   DP Standard Dust Mite - D. Pteronyssinus 1:10 + 10mmP/E   A Aspergillus fumigatus  1:10 neg    P Penicillium notatum 1:10 neg 5mmP     Conclusion:  Slight immediate type reaction to house dust mites. No delayed reaction after 2 days on IDT.     [x] No relevant allergic reaction observed    []  Allergic reaction diagnosed against following allergens:      Interpretation/ remarks:   See later    [] Patient information given   [] ACDS CAMP information's (# ....) to following compounds: .....   [] General information's to following compounds: ......    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    ==> Treatment prescribed/Plan:  See later      Follow-up: in Derm-Allergy clinic for 2nd readings and final conclusions after 4 days (in person)      Patient counseling: Stop all antihistamines 1 week before tests      Thank you for the opportunity be involved in the care of this patient.     Staff:  Alycia Pizarro RN  ___________________________________________________________________________    Teledermatology information:  - Location of patient: Home  - Patient presented in referral from:  other  - Location of teledermatologist:  [facility] [place] (, [place], [place])  - Reason teledermatology is appropriate:  of National Emergency Regarding Coronavirus disease (COVID 19) Outbreak  - Method of transmission:  Store and Forward and Telephone Doximity and pictures in Epic media.   - Date of images: in Epic media  - Service start time:2:15pm  - Service end time: 2:35pm  - Date of report: 03/03/21    I spent a total of 20 minutes for telemedicine consult with Eric MONTOYA  Jesús during today s video meeting. Over 50% of this time was spent counseling the patient and/or coordinating care. Please see Assessment and Plan for details.     Eric is a 65 year old who is being evaluated via a billable video visit.      How would you like to obtain your AVS? MyChart  If the video visit is dropped, the invitation should be resent by: Text to cell phone: [phone]  Will anyone else be joining your video visit? No      Alycia Crooks RN        Again, thank you for allowing me to participate in the care of your patient.        Sincerely,        Rob Zendejas MD

## 2021-03-05 ENCOUNTER — TELEPHONE (OUTPATIENT)
Dept: UROLOGY | Facility: CLINIC | Age: 66
End: 2021-03-05

## 2021-03-05 ENCOUNTER — VIRTUAL VISIT (OUTPATIENT)
Dept: ALLERGY | Facility: CLINIC | Age: 66
End: 2021-03-05
Payer: COMMERCIAL

## 2021-03-05 DIAGNOSIS — J02.9 ALLERGIC PHARYNGITIS: Primary | ICD-10-CM

## 2021-03-05 DIAGNOSIS — Z88.9 ATOPY: ICD-10-CM

## 2021-03-05 DIAGNOSIS — J45.991 COUGH VARIANT ASTHMA: ICD-10-CM

## 2021-03-05 DIAGNOSIS — L23.89 ALLERGIC CONTACT DERMATITIS DUE TO OTHER AGENTS: ICD-10-CM

## 2021-03-05 DIAGNOSIS — Z91.09 HOUSE DUST MITE ALLERGY: ICD-10-CM

## 2021-03-05 DIAGNOSIS — N48.1 BALANITIS: ICD-10-CM

## 2021-03-05 PROCEDURE — 99214 OFFICE O/P EST MOD 30 MIN: CPT | Mod: 95 | Performed by: DERMATOLOGY

## 2021-03-05 ASSESSMENT — PAIN SCALES - GENERAL: PAINLEVEL: NO PAIN (0)

## 2021-03-05 NOTE — PATIENT INSTRUCTIONS
House Dust Mite Allergy        The house dust mite is an arachnid about 0.3 mm in size and not visible to the naked eye. There are around 150 species of house dust mites in the world. One mite produces up to 40 fecal droppings a day. One teaspoonful of bedroom dust contains an average of nearly 1000 mites and 250,000 minute droppings.    Causes and triggers of house dust mite allergy  The house dust mite requires a warm, moist environment without light in order to live and reproduce. Our beds are ideal. The mite feeds on human and animal skin scales. The allergen is mainly contained in the mite's feces. The feces contain allergy-triggering constituents which are spread in fine dust, are breathed in and can cause an allergic reaction.    Symptoms  When the allergens come into contact with the mucous membranes in the eyes, nose, mouth and throat, sufferers develop symptoms typical of an allergic cold (allergic rhinitis) or an allergic inflammation of the conjunctiva (allergic conjunctivitis): blocked or runny nose, sneezing, red, itchy eyes. If all of these symptoms are present, then the condition is also known as rhinoconjunctivitis. Often, the upper respiratory tract becomes chronically inflamed, primarily because house dust mites are present all year round.  The symptoms of house dust mite allergy typically occur in the morning and are more frequent in the cold months of the year.    Therapy and treatment  As a first step, mattress, pillows and duvet/comforter should be placed in mite-proof or anti-mite covers, sometimes known as encasings. Alternatively you can use pillows or comforter that can be washed at over 130 F monthly. At the same time, house dust should be minimized. If necessary, the symptoms can be treated with medication, for example antihistamines in the form of nasal sprays, eye drops and tablets. Desensitization/specific immunotherapy (SIT) is recommended for house dust allergy if all the measures  "above are not sufficient.    Tips and tricks:    Keep room temperature at 66-70 F and relative air humidity at a maximum of 50%.    Ideally, thoroughly air your home two to three times a day for 5 to 10 minutes each time.    Wash bed linens in at least 130 F every week.    Remove stuffed animals or freeze them every other week.    Keep ceiling fans off in the bedroom as they can stir up dust mite allergens.    Remove dust from furniture with a damp cloth and regularly wet mop floors.    Do not put pot and hydroponic plants in the bedroom and also avoid putting too many in living areas, as they increase room humidity.    When staying overnight in other accommodations, we recommend taking your own bed linen and the above anti-mite mattress covers with you.    Remove upholstered furniture from the bedroom and consider removing the carpets. Ideally, use sealed parquet or laminate sofya, cork tiles or sofya made of wood, novilon or PVC.    Maybe additionally reduce dust mites in mattress, upholstery, or sofya using hot steam .      Modified from \"House Dust Mite Allergy\" by aha! [facility].    Order for PATCH TESTS    [x] Outpatient  [] Inpatient: Xiao..../ Bed ....      Skin Atopy (atopic dermatitis) [] Yes   [x] No  Rhinitis/Sinusitis:   [x] Yes   [] No  Allergic Asthma:   [] Yes   [x] No  Food Allergy:   [] Yes   [x] No  Leg ulcers:   [] Yes   [x] No  Hand eczema:   [] Yes   [x] No   Leading hand:   [] R   [] L       [] Ambidextrous                        Reason for tests (suspected allergy): recurrent dermatitis on scalp and glans penis and maybe oral cavity  Known previous allergies: none    Standardized panels  [x] Standard panel (38 tests)  [x] Preservatives & Antimicrobials (27 tests)  [x] Emulsifiers & Additives (21 tests)   [x] Perfumes/Flavours & Plants (24 tests)  [] Hairdresser panel (12 tests)  [] Rubber Chemicals (22 tests)  [] Plastics (26 tests)  [x] Colorants/Dyes/Food " additives (17 tests)  [] Metals (implants/dental) (24 tests)  [] Local anaesthetics/NSAIDs (14 tests)  [x] Antibiotics & Antimycotics (14 tests)   [] Corticosteroids (15 tests)   [] Photopatch test (62 tests)   [] others: ...      [] Patient's own products: ...    DO NOT test if chemical or biological identity is unknown!     always ask from patient the product information and safety sheets (MSDS)     Order for PRICK TESTS    [x] Outpatient  [] Inpatient: Xiao..../ Bed ....      Skin Atopy (atopic dermatitis) [] Yes   [x] No  Contact allergies:   [x] Yes   [] No  Urticaria/Angioedema  [] Yes   [x] No  Rhinitis/Sinusitis:   [] Yes   [x] No recently some recurrent Sinusitis with yellow exudate   [] seasonal [] perennial            Allergic Asthma:   [] Yes   [x] No  Pets :  [x] Yes   [] No  Which? Had dogs for 20 years (last one  2 weeks ago)  Food Allergy:   [x] Yes   [x] No  Drug allergies:    X Yes   [] No Ciprofloxacine rash on chest    Reason for tests (suspected allergy): chronic pharyngitis with recurrent Sinusitis  Known previous allergies: none    Standardized prick panels  [x] Atopic panel (20 tests)  [] Pediatric Panel (12 tests)  [] Milk, Meat, Eggs, Grains (20 tests)   [] Dust, Epithelia, Feathers (10 tests)  [] Fish, Seafood, Shellfish (17 tests)  [] Nuts, Beans (14 tests)  [] Spice, Vegetable, Fruit (17 tests)  [] Others: ...      [] Patient's own products: ...    DO NOT test if chemical or biological identity is unknown!     always ask from patient the product information and safety sheets (MSDS)     Standardized intradermal tests  [x] Penicillium notatum [x] Aspergillus fumigatus [x] House dust mites  [] Bee venom   [] Wasp venom !!Specific protocol with dilutions!!  [] Others: ...    RESULTS & EVALUATION of PATCH TESTS    Patch test readings after     [x] 2 days, [] 3 days [x] 4 days, [] 5 days,    Applied patch tests with results (2021 applied patch tests):    STANDARD Series      No  Substance 2 days 4 days remarks   1 Sumit Mix [C] - -    2 Colophony - -    3  2-Mercaptobenzothiazole  - -     4 Methylisothiazolinone - -    5 Carba Mix - +/++    6 Thiuram Mix [A] - -    7 Bisphenol A Epoxy Resin - -    8 U-Zqkm-Ulmbylnhnkq-Formaldehyde Resin - -    9 Mercapto Mix [A] - -    10 Black Rubber Mix- PPD [B] - -    11 Potassium Dichromate  -  -    12 Balsam of [place] (Myroxylon Pereirae Resin) - -    13 Nickel Sulphate Hexahydrate - -    14 Mixed Dialkyl Thiourea - -    15 Paraben Mix [B] - -    16 Methyldibromo Glutaronitrile - -    17 Fragrance Mix (+) -    18 2-Bromo-2-Nitropropane-1,3-Diol (Bronopol) NA NA    19 Lyral - -    20 Tixocortol-21- Pivalate - -    21 Diazolidiyl Urea (Germall II) - -    22 Methyl Methacrylate - -    23 Cobalt (II) Chloride Hexahydrate - -    24 Fragrance Mix II  - -    25 Compositae Mix - -    26 Benzoyl Peroxide - -    27 Bacitracin - -    28 Formaldehyde - -    29 Methylchloroisothiazolinone / Methylisothiazolinone - -    30 Corticosteroid Mix NA NA    31 Sodium Lauryl Sulfate - -    32 Lanolin Alcohol - -    33 Turpentine - -    34 Cetylstearylalcohol - -    35 Chlorhexidine Dicluconate - -    36 Budenoside - -    37 Imidazolidinyl Urea  - -    38 Ethyl-2 Cyanoacrylate - -    39 Quaternium 15 (Dowicil 200) - -    40 Decyl Glucoside - -      COLORANTS, DYES, FOOD ADDITIVES   No Substance 2 days 4 days remarks   41 1 Aniline - -    42 2 Keyshawn Brown R - -    43 3 Textile Dye Mix [A] - -    44 4 Sterling  - -    45 5 Disperse Blue-3 - -    46 6 Disperse [place]-3 - -    47 7 Disperse Red-11 NA NA    48 8 Disperse Yellow-9 - -    49 9 Erythrosine-B - -    50 10 Naphthol AS - -       Food additives      51 11 Aspartame - -    52 12 Azorubine - -    53 13 Brilliant Black - -    54 14 Cochineal Red - -    55 15 Patent Blue-VF NA NA    56 16 Pectin - -    57 17 Quinoline Yellow - -    58 18 Saccharin - -    59 19 Tartrazine - -    60 20 Sodium Glutamate NA NA      EMULSIFIERS &  ADDITIVES   No Substance 2 days 4 days remarks   61 1 Polyethylene Glycol-400 NA NA    62 2 Cocamidopropyl Betaine - -    63 3 Amerchol L101 - -    64 4 Propylene Glycol - -    65 5 Triethanolamine - -    66 6 Sorbitane Sesquiolate - -    67 7 Isopropylmyristate - -    68 8 Polysorbate 80  - -    69 9 Amidoamine   (Stearamidopropyl Dimethylamine) - -    70 10 Oleamidopropyl Dimethylamine - -    71 11 Lauryl Glucoside NA NA    72 12 Coconut Diethanolamide  - -    73 13 2-Hydroxy-4-Methoxy Benzophenone (Oxybenzone) - -    74 14 Benzophenone-4 (Sulisobenzon) - -    75 15 Propolis - +/++    76 16 Dexpanthenol - -    77 17 Carboxymethyl Cellulose Sodium - -    78 18 Abitol - -    79 19 Tert-Butylhydroquinone - -    80 20 Benzyl Salicylate - -      Antioxidant      81 21 Dodecyl Gallate - ++    82 22 Butylhydroxyanisole (BHA) - -    83 23 Butylhydroxytoluene (BHT) - -    84 24 Di-Alpha-Tocopherol (Vit E) - -    85 25 Propyl Gallate - -    86 26 Zinc Pyrithione NA NA    87 27 Dimethylaminopropylamin (DMAPA)        PRESERVATIVES & ANTIMICROBIALS     No Substance 2 days 4 days remarks   88 1  1,2-Benzisothiazoline-3-One, Sodium Salt - +    89 2  1,3,5-Tano (2-Hydroxyethyl) - Hexahydrotriazine (Grotan BK) - -    90 3 2-Ekeveegcrgkia-6-Nitro-1, 3-Propanediol - -    91 4  3, 4, 4' - Triclocarban - -    92 5 4 - Chloro - 3 - Cresol - -    93 6 4 - Chloro - 4 - Xylenol (PCMX) - -    94 7 7-Ethylbicyclooxazolidine (Bioban SB0448) - -    95 8 Benzalkonium Chloride NA NA    96 9 Benzyl Alcohol NA NA    97 10 Cetalkonium Chloride - -    98 11 Cetylpyrimidine Chloride  - -    99 12 Chloroacetamide - -    100 13 DMDM Hydantoin NA NA    101 14 Glutaraldehyde - -    102 15 Triclosan - -    103 16 Glyoxal Trimeric Dihydrate - -    104 17 Iodopropynyl Butylcarbamate - -    105 18 Octylisothiazoline - -    106 19 Iodoform NA NA    107 20 (Nitrobutyl) Morpholine/(Ethylnitro-Trimethylene) Dimorpholine ([facility] P 1487) - -    108 21  Phenoxyethanol - -    109 22 Phenyl Salicylate - -    110 23 Povidone Iodine - -    111 24 Sodium Benzoate - -    112 25 Sodium Disulfite - -    113 26 Sorbic Acid - -    114 27 Thimerosal - -    115 28 Melamine Formeladyde Resin      116 29 Ethylenediamine Dihydrochloride        Parabens      117 30 Butyl-P-Hydroxybenzoate - -    118 31 Ethyl-P-Hydroxybenzoate - -    119 32 Methyl-P-Hydroxybenzoate - -    120 33 Propyl-P-Hydroxybenzoate - -      PERFUMES, FLAVORS & PLANTS      No Substance 2 days 4 days remarks   121 1 Benzyl Cinnamate - -    122 2 Di-Limonene (Dipentene) - -    123 3 [place] Odorata (Ylang Ylang) (I) - -    124 4 Lichen Acid Mix - -    125 5 Mentha Piperita Oil (Peppermint Oil) - -    126 6 Sesquiterpenelactone mix - -    127 7 Tea Tree Oil, Oxidized - -    128 8 Wood Tar Mix - -    129 9 Abietic Acid - -    130 10 Lavendula Angustifolia Oil (Lavender Oil) - -    131 11 Camphor  NA NA      Fragrance Mix I      132 12 [place] Absolute - -    133 13 Eugenol - -    134 14 Geraniol - -    135 15 Hydroxycitronellal - -    136 16 Isoeugenol - -    137 17 Cinnamic Aldehyde - -    138 18 Cinnamic Alcohol  - -      Fragrance mix II      139 19 Citronellol - -    140 20 Alpha-Hexylcinnamic Aldehyde    - -    141 21 Citral - -    142 22 Farnesol - -    143 23 Coumarin - -      ANTIBIOTICS & ANTIMYCOTICS    No Substance 2 days 4 days remarks   144 1 Erythromycine - -    145 2 Framycetine Sulphate - -    146 3 Fusidic Acid Sodium Salt - -    147 4 Gentamicin Sulphate - -    148 5 Neomycine Sulphate - -    149 6 Oxytetracycline  - -    150 7 Polymyxin B Sulphate - -    151 8 Tetracycline-HCL - -    152 9 Sulfanilamide - -    153 10 Metronidazole - -    154 11 Oxyquinoline Mix - -    155 12 Nitrofurazone - -    156 13 Nystatin - -    157 14 Clotrimazole - -        Results of patch tests:                         Interpretation:    - Negative                    A    = Allergic      (+) Erythema    TI   =  Toxic/irritant   + E + Infiltration    RaP = Relevance at Present     ++ E/I + Papulovesicle   Rpr  = Relevance Previously     +++ E/I/P + Blister     nR   = No Relevance      Atopy Screen (Placed 03/01/21 )    No Substance Readings (15 min) Evaluation   POS Histamine 1mg/ml +    NEG NaCl 0.9% -      No Substance Readings (15 min) Evaluation   1 Alternaria alternata (tenuis)  -    2 Cladosporium herbarum -    3 Aspergillus fumigatus -    4 Penicillium notatum -    5 Dermatophagoides pteronyssinus -    6 Dermatophagoides farinae -    7 Dog epithelium (canis spp) -    8 Cat hair (crow catus) -    9 Cockroach   (Blatella americana & germanica) -    10 Grass mix midwest   ([place], [place], [place], [place]) -    11 Alonso grass (sorghum halepense) -    12 Weed mix   (common Cocklebur, Lamb s quarters, rough redroot Pigweed, Dock/Sorrel) -    13 Mug wort (artemisia vulgare) -    14 Ragweed giant/short (ambrosia spp) -    15 English Plantain (plantago lanceolata) -    16 Tree mix 1 (Pecan, Maple BHR, Oak RVW, american [place], black Black Eagle) -    17 Red cedar (juniperus virginia) -    18 Tree mix 2   (white [place], river/red Birch, black [place], common [place], american Elm) -    19 Box elder/Maple mix (acer spp) -    20 [place] shagbark (carya ovata) -       -    Conclusion: no signs for positive prick tests    Intradermal Testing (Placed 03/01/21 )    No Substance Conc.  Readings (15min) Evaluation   + Histamine (prick) 0.1mg / ml +    DF Standard Dust Mite - D. Farinae 1:10 + 10mmP/E   DP Standard Dust Mite - D. Pteronyssinus 1:10 + 10mmP/E   A Aspergillus fumigatus  1:10 neg    P Penicillium notatum 1:10 neg 5mmP   Conclusion:  Slight immediate type reaction to house dust mites. After 4 days delayed type reaction with infiltrate and erythema of 6mm diameter    [x] Allergic reaction diagnosed against following allergens:  - Dodecyl Gallate ++  - Carba mix +/++  - Propolis +/++  - 1,2-Benzisothiazoline-3-One, Sodium Salt  +    Interpretation/ remarks:   The Dodecyl Gallate was the most convincing reaction and it could be really relevant inducing contact allergies as anti-oxidant and preservative in many creams and cosmetics.  There was reaction (pretty localised about 3mm, but with vesicles) to Carba mix (rubber additive) and Propolis (organic preservative from bee hives). Slight reaction as well to preservative Benzisothiazolinone, but NOT to the Methylisothiazolinone.  Moreover, delayed type reaction to the house dust mite D. Pteronyssinus, not to D. Farinae and molds. Could be an indication for atopy.    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    ==> Treatment prescribed/Plan:  >> follow exactly the recommendations of the CAMP [place] and use only skin care (and oral) products that do not contain the additives.  >> try to reduce the exposure to house dust mites (info given) and maybe try inhaled steroids. Immunotherapy to dust mites with delayed type hypersensitivity not an option.  >> try Fluticasone inhaler 1-2 times daily  for 1 month       Follow-up: in [facility] clinic virtual in about 5 weeks

## 2021-03-05 NOTE — TELEPHONE ENCOUNTER
----- Message from Renetta Pavon CNP sent at 3/2/2021  5:27 PM CST -----  Regarding: Schedule Cysto  Hello,    Patient needs cysto scheduled with the first available between Dr. Rebolledo, Eduard Gallegos, or Anastacia for recurrent UTIs.    Thanks!    Renetta Pavon, CNP

## 2021-03-05 NOTE — TELEPHONE ENCOUNTER
Left message for pt to call and schedule a cysto with Dr. Rebolledo, El, Eduard, or Anastacia for recurrent UTIs.

## 2021-03-05 NOTE — PROGRESS NOTES
Note for return visit for Dermato-Allergy       Encounter Date: Mar 5, 2021    History of Present Illness:  Eric Espinosa is a 65 year old male who presents in person to the consult following information has been take directly from the prior notes, patients informations, Epic and/or other sources and exam/history performed by myself:     Patient in Derm-Allergy clinic for 2nd readings and final conclusions after 4 days (in person)    Additional comments and observations from review of the patient s chart including the following:    See notes for more details    ROS: Patient generally feeling well today   Physical Examination:  General: Well-appearing, appropriately-developed individual.  - Skin: Focused examination of the skin on test sites within the consultation was performed.   See test results below  As above in HPI. No additional skin concerns.  - upper respiratory tract: currently no obvious Rhinitis  - lungs: no signs for active and present Asthma/Wheezing or coughing  - eyes: currently no active conjunctivits  - GI system/digestion: currently no problems, no bloating or Diarrhoea      Allergy Problem List:    Specialty Problems        Allergy Diagnoses    Psoriasis        Dermatitis, seborrheic            Earlier History and Allergy exams:    Patient has been on several ENTs (Dr. Cote)with constant sore throat and slight ethmoid sinusitis, no acid reflux --> started about 10 months ago  In addition patient had Balanitis and dermatitis on scalp and trunk/shoulders. On the shoulders based on pictures from September 2020 look like localized seropapules = maybe ectoparasitosis = Chiggers?    >> patient started about 10 years ago Atorvastatin and did stop it in April.       Patient was patient of Dr. Fournier in November 2021:     1. History of NMSC  -BCC, nose, s/p Mohs surgery 2015   -BCC, L superior shoulder ([facility] 2013)  -BCC, forehead (2012 w/ Dr. Fernández in [place], [place]).   2. Actinic keratoses,  forehead s/p cryo   3. Hemangioma L shoudler s/p excision 4/26/2016 (suspected lymph node)  4. Benign Bx  - IDN, central forehead, s/p shave bx 11/1/19  5 Seborrheic dermatitis  - patch on left temporal scalp with predominant scale  - Current tx: Alternate Selsan blue and head & shoulders for shampooing, lidex solution as needed (discussed 10/19/18)  6. Balanitis  - Current Tx: hydrocortisone 2.5% ointment BID PRN      Past Medical History:   Patient Active Problem List   Diagnosis     Psoriasis     Neoplasm of uncertain behavior of skin     AK (actinic keratosis)     BCC (basal cell carcinoma), trunk     Actinic keratosis     History of skin cancer     Diverticulitis     History of basal cell cancer     Dermal nevus     Dermatitis, seborrheic     Past Medical History:   Diagnosis Date     Arthritis      Autoimmune disease (H)      Basal cell carcinoma      Chronic tonsillitis      Hoarseness      Skin cancer        Allergy History:     Allergies   Allergen Reactions     Ciprofloxacin Rash       Social History:  The patient is retired. Patient has the following hobbies or non-occupational exposure      reports that he has never smoked. He has never used smokeless tobacco. He reports current alcohol use. He reports that he does not use drugs.      Family History:  Family History   Problem Relation Age of Onset     Diabetes Father      Heart Disease Father      Hypertension Mother      Cancer No family hx of         No family history of skin cancer     Skin Cancer No family hx of      Glaucoma No family hx of      Macular Degeneration No family hx of        Medications:  Current Outpatient Medications   Medication Sig Dispense Refill     ARTIFICIAL TEAR OP Apply 1 drop to eye as needed       atorvastatin (LIPITOR) 20 MG tablet Take 1 tablet (20 mg) by mouth daily (Patient not taking: Reported on 10/10/2020) 90 tablet 3     cephALEXin (KEFLEX) 500 MG capsule Take 1 capsule (500 mg) by mouth 2 times daily for 7 days 14  capsule 0     fluocinonide (LIDEX) 0.05 % solution Apply topically 2 times daily (Patient not taking: Reported on 10/10/2020) 60 mL 1     hydrocortisone 2.5 % ointment Apply topically 2 times daily To inflammation on penis 30 g 11     ibuprofen (ADVIL,MOTRIN) 200 MG tablet Take 200 mg by mouth every 4 hours as needed       MELATONIN PO Take 5 mg by mouth At Bedtime        tamsulosin (FLOMAX) 0.4 MG capsule Take 1 capsule (0.4 mg) by mouth daily 90 capsule 3     zaleplon (SONATA) 5 MG capsule Take 1 capsule (5 mg) by mouth nightly as needed for sleep 15 capsule 0     zolpidem (AMBIEN) 5 MG tablet Take 1 tablet (5 mg) by mouth nightly as needed for sleep (Patient not taking: Reported on 10/10/2020) 30 tablet 1       Allergy tests:    Past Allergy tests      Current Allergy tests (or planned)      Order for PATCH TESTS    [x] Outpatient  [] Inpatient: [facility]..../ Bed ....      Skin Atopy (atopic dermatitis) [] Yes   [x] No  Rhinitis/Sinusitis:   [x] Yes   [] No  Allergic Asthma:   [] Yes   [x] No  Food Allergy:   [] Yes   [x] No  Leg ulcers:   [] Yes   [x] No  Hand eczema:   [] Yes   [x] No   Leading hand:   [] R   [] L       [] Ambidextrous                        Reason for tests (suspected allergy): recurrent dermatitis on scalp and glans penis and maybe oral cavity  Known previous allergies: none    Standardized panels  [x] Standard panel (38 tests)  [x] Preservatives & Antimicrobials (27 tests)  [x] Emulsifiers & Additives (21 tests)   [x] Perfumes/Flavours & Plants (24 tests)  [] Hairdresser panel (12 tests)  [] Rubber Chemicals (22 tests)  [] Plastics (26 tests)  [x] Colorants/Dyes/Food additives (17 tests)  [] Metals (implants/dental) (24 tests)  [] Local anaesthetics/NSAIDs (14 tests)  [x] Antibiotics & Antimycotics (14 tests)   [] Corticosteroids (15 tests)   [] Photopatch test (62 tests)   [] others: ...      [] Patient's own products: ...    DO NOT test if chemical or biological identity is unknown!     always  ask from patient the product information and safety sheets (MSDS)     Order for PRICK TESTS    [x] Outpatient  [] Inpatient: [facility]..../ Bed ....      Skin Atopy (atopic dermatitis) [] Yes   [x] No  Contact allergies:   [x] Yes   [] No  Urticaria/Angioedema  [] Yes   [x] No  Rhinitis/Sinusitis:   [] Yes   [x] No recently some recurrent Sinusitis with yellow exudate   [] seasonal [] perennial            Allergic Asthma:   [] Yes   [x] No  Pets :  [x] Yes   [] No  Which? Had dogs for 20 years (last one  2 weeks ago)  Food Allergy:   [x] Yes   [x] No  Drug allergies:    X Yes   [] No Ciprofloxacine rash on chest    Reason for tests (suspected allergy): chronic pharyngitis with recurrent Sinusitis  Known previous allergies: none    Standardized prick panels  [x] Atopic panel (20 tests)  [] Pediatric Panel (12 tests)  [] Milk, Meat, Eggs, Grains (20 tests)   [] Dust, Epithelia, Feathers (10 tests)  [] Fish, Seafood, Shellfish (17 tests)  [] Nuts, Beans (14 tests)  [] Spice, Vegetable, Fruit (17 tests)  [] Others: ...      [] Patient's own products: ...    DO NOT test if chemical or biological identity is unknown!     always ask from patient the product information and safety sheets (MSDS)     Standardized intradermal tests  [x] Penicillium notatum [x] Aspergillus fumigatus [x] House dust mites  [] Bee venom   [] Wasp venom !!Specific protocol with dilutions!!  [] Others: ...    RESULTS & EVALUATION of PATCH TESTS    Patch test readings after     [x] 2 days, [] 3 days [x] 4 days, [] 5 days,    Applied patch tests with results (2021 applied patch tests):    STANDARD Series      No Substance 2 days 4 days remarks   1 Sumit Mix [C] - -    2 Colophony - -    3  2-Mercaptobenzothiazole  - -     4 Methylisothiazolinone - -    5 Carba Mix - +/++    6 Thiuram Mix [A] - -    7 Bisphenol A Epoxy Resin - -    8 F-Otta-Hrijrsvsdpu-Formaldehyde Resin - -    9 Mercapto Mix [A] - -    10 Black Rubber Mix- PPD [B] - -    11  Potassium Dichromate  -  -    12 Balsam of [place] (Myroxylon Pereirae Resin) - -    13 Nickel Sulphate Hexahydrate - -    14 Mixed Dialkyl Thiourea - -    15 Paraben Mix [B] - -    16 Methyldibromo Glutaronitrile - -    17 Fragrance Mix (+) -    18 2-Bromo-2-Nitropropane-1,3-Diol (Bronopol) NA NA    19 Lyral - -    20 Tixocortol-21- Pivalate - -    21 Diazolidiyl Urea (Germall II) - -    22 Methyl Methacrylate - -    23 Cobalt (II) Chloride Hexahydrate - -    24 Fragrance Mix II  - -    25 Compositae Mix - -    26 Benzoyl Peroxide - -    27 Bacitracin - -    28 Formaldehyde - -    29 Methylchloroisothiazolinone / Methylisothiazolinone - -    30 Corticosteroid Mix NA NA    31 Sodium Lauryl Sulfate - -    32 Lanolin Alcohol - -    33 Turpentine - -    34 Cetylstearylalcohol - -    35 Chlorhexidine Dicluconate - -    36 Budenoside - -    37 Imidazolidinyl Urea  - -    38 Ethyl-2 Cyanoacrylate - -    39 Quaternium 15 (Dowicil 200) - -    40 Decyl Glucoside - -      COLORANTS, DYES, FOOD ADDITIVES   No Substance 2 days 4 days remarks   41 1 Aniline - -    42 2 Keyshawn Brown R - -    43 3 Textile Dye Mix [A] - -    44 4 [place]  - -    45 5 Disperse Blue-3 - -    46 6 Disperse [place]-3 - -    47 7 Disperse Red-11 NA NA    48 8 Disperse Yellow-9 - -    49 9 Erythrosine-B - -    50 10 Naphthol AS - -       Food additives      51 11 Aspartame - -    52 12 Azorubine - -    53 13 Brilliant Black - -    54 14 Cochineal Red - -    55 15 Patent Blue-VF NA NA    56 16 Pectin - -    57 17 Quinoline Yellow - -    58 18 Saccharin - -    59 19 Tartrazine - -    60 20 Sodium Glutamate NA NA      EMULSIFIERS & ADDITIVES   No Substance 2 days 4 days remarks   61 1 Polyethylene Glycol-400 NA NA    62 2 Cocamidopropyl Betaine - -    63 3 Amerchol L101 - -    64 4 Propylene Glycol - -    65 5 Triethanolamine - -    66 6 Sorbitane Sesquiolate - -    67 7 Isopropylmyristate - -    68 8 Polysorbate 80  - -    69 9 Amidoamine   (Stearamidopropyl  Dimethylamine) - -    70 10 Oleamidopropyl Dimethylamine - -    71 11 Lauryl Glucoside NA NA    72 12 Coconut Diethanolamide  - -    73 13 2-Hydroxy-4-Methoxy Benzophenone (Oxybenzone) - -    74 14 Benzophenone-4 (Sulisobenzon) - -    75 15 Propolis - +/++    76 16 Dexpanthenol - -    77 17 Carboxymethyl Cellulose Sodium - -    78 18 Abitol - -    79 19 Tert-Butylhydroquinone - -    80 20 Benzyl Salicylate - -      Antioxidant      81 21 Dodecyl Gallate - ++    82 22 Butylhydroxyanisole (BHA) - -    83 23 Butylhydroxytoluene (BHT) - -    84 24 Di-Alpha-Tocopherol (Vit E) - -    85 25 Propyl Gallate - -    86 26 Zinc Pyrithione NA NA    87 27 Dimethylaminopropylamin (DMAPA)        PRESERVATIVES & ANTIMICROBIALS     No Substance 2 days 4 days remarks   88 1  1,2-Benzisothiazoline-3-One, Sodium Salt - +    89 2  1,3,5-Tano (2-Hydroxyethyl) - Hexahydrotriazine (Grotan BK) - -    90 3 9-Lrivoikndkyca-7-Nitro-1, 3-Propanediol - -    91 4  3, 4, 4' - Triclocarban - -    92 5 4 - Chloro - 3 - Cresol - -    93 6 4 - Chloro - 4 - Xylenol (PCMX) - -    94 7 7-Ethylbicyclooxazolidine (Bioban JF1151) - -    95 8 Benzalkonium Chloride NA NA    96 9 Benzyl Alcohol NA NA    97 10 Cetalkonium Chloride - -    98 11 Cetylpyrimidine Chloride  - -    99 12 Chloroacetamide - -    100 13 DMDM Hydantoin NA NA    101 14 Glutaraldehyde - -    102 15 Triclosan - -    103 16 Glyoxal Trimeric Dihydrate - -    104 17 Iodopropynyl Butylcarbamate - -    105 18 Octylisothiazoline - -    106 19 Iodoform NA NA    107 20 (Nitrobutyl) Morpholine/(Ethylnitro-Trimethylene) Dimorpholine (Bioban P 1487) - -    108 21 Phenoxyethanol - -    109 22 Phenyl Salicylate - -    110 23 Povidone Iodine - -    111 24 Sodium Benzoate - -    112 25 Sodium Disulfite - -    113 26 Sorbic Acid - -    114 27 Thimerosal - -    115 28 Melamine Formeladyde Resin      116 29 Ethylenediamine Dihydrochloride        Parabens      117 30 Butyl-P-Hydroxybenzoate - -    118 31  Ethyl-P-Hydroxybenzoate - -    119 32 Methyl-P-Hydroxybenzoate - -    120 33 Propyl-P-Hydroxybenzoate - -      PERFUMES, FLAVORS & PLANTS      No Substance 2 days 4 days remarks   121 1 Benzyl Cinnamate - -    122 2 Di-Limonene (Dipentene) - -    123 3 [place]anga Odorata (Niki Prince) (I) - -    124 4 Lichen Acid Mix - -    125 5 Mentha Piperita Oil (Peppermint Oil) - -    126 6 Sesquiterpenelactone mix - -    127 7 Tea Tree Oil, Oxidized - -    128 8 Wood Tar Mix - -    129 9 Abietic Acid - -    130 10 Lavendula Angustifolia Oil (Lavender Oil) - -    131 11 Camphor  NA NA      Fragrance Mix I      132 12 [place]moss Absolute - -    133 13 Eugenol - -    134 14 Geraniol - -    135 15 Hydroxycitronellal - -    136 16 Isoeugenol - -    137 17 Cinnamic Aldehyde - -    138 18 Cinnamic Alcohol  - -      Fragrance mix II      139 19 Citronellol - -    140 20 Alpha-Hexylcinnamic Aldehyde    - -    141 21 Citral - -    142 22 Farnesol - -    143 23 Coumarin - -      ANTIBIOTICS & ANTIMYCOTICS    No Substance 2 days 4 days remarks   144 1 Erythromycine - -    145 2 Framycetine Sulphate - -    146 3 Fusidic Acid Sodium Salt - -    147 4 Gentamicin Sulphate - -    148 5 Neomycine Sulphate - -    149 6 Oxytetracycline  - -    150 7 Polymyxin B Sulphate - -    151 8 Tetracycline-HCL - -    152 9 Sulfanilamide - -    153 10 Metronidazole - -    154 11 Oxyquinoline Mix - -    155 12 Nitrofurazone - -    156 13 Nystatin - -    157 14 Clotrimazole - -        Results of patch tests:                         Interpretation:    - Negative                    A    = Allergic      (+) Erythema    TI   = Toxic/irritant   + E + Infiltration    RaP = Relevance at Present     ++ E/I + Papulovesicle   Rpr  = Relevance Previously     +++ E/I/P + Blister     nR   = No Relevance      Atopy Screen (Placed 03/01/21 )    No Substance Readings (15 min) Evaluation   POS Histamine 1mg/ml +    NEG NaCl 0.9% -      No Substance Readings (15 min) Evaluation   1  Alternaria alternata (tenuis)  -    2 Cladosporium herbarum -    3 Aspergillus fumigatus -    4 Penicillium notatum -    5 Dermatophagoides pteronyssinus -    6 Dermatophagoides farinae -    7 Dog epithelium (canis spp) -    8 Cat hair (crow catus) -    9 Cockroach   (Blatella americana & germanica) -    10 Grass mix midwest   ([place], Orchard, [place], Eber) -    11 Alonso grass (sorghum halepense) -    12 Weed mix   (common Cocklebur, Lamb s quarters, rough redroot Pigweed, Dock/Sorrel) -    13 Mug wort (artemisia vulgare) -    14 Ragweed giant/short (ambrosia spp) -    15 English Plantain (plantago lanceolata) -    16 Tree mix 1 (Pecan, Maple BHR, Oak RVW, american [place], black [place]) -    17 Red cedar (juniperus virginia) -    18 Tree mix 2   (white Joselito, river/red Birch, black Scre[place], common [place], american Elm) -    19 Box elder/Maple mix (acer spp) -    20 [place] shagbark (carya ovata) -       -    Conclusion: no signs for positive prick tests    Intradermal Testing (Placed 03/01/21 )    No Substance Conc.  Readings (15min) Evaluation   + Histamine (prick) 0.1mg / ml +    DF Standard Dust Mite - D. Farinae 1:10 + 10mmP/E   DP Standard Dust Mite - D. Pteronyssinus 1:10 + 10mmP/E   A Aspergillus fumigatus  1:10 neg    P Penicillium notatum 1:10 neg 5mmP   Conclusion:  Slight immediate type reaction to house dust mites. After 4 days delayed type reaction with infiltrate and erythema of 6mm diameter    [x] Allergic reaction diagnosed against following allergens:  - Dodecyl Gallate ++  - Carba mix +/++  - Propolis +/++  - 1,2-Benzisothiazoline-3-One, Sodium Salt +    Interpretation/ remarks:   The Dodecyl Gallate was the most convincing reaction and it could be really relevant inducing contact allergies as anti-oxidant and preservative in many creams and cosmetics.  There was reaction (pretty localised about 3mm, but with vesicles) to Carba mix (rubber additive) and Propolis (organic preservative from  bee hives). Slight reaction as well to preservative Benzisothiazolinone, but NOT to the Methylisothiazolinone.  Moreover, delayed type reaction to the house dust mite D. Pteronyssinus, not to D. Farinae and molds. Could be an indication for atopy.    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    ==> Treatment prescribed/Plan:  >> follow exactly the recommendations of the [place] and use only skin care (and oral) products that do not contain the additives.  >> try to reduce the exposure to house dust mites (info given) and maybe try inhaled steroids. Immunotherapy to dust mites with delayed type hypersensitivity not an option.  >> try Fluticasone inhaler 1-2 times daily for 1 month       Follow-up: in Derm-Allergy clinic virtual in about 5 weeks      Thank you for the opportunity be involved in the care of this patient.     Staff:  Reema Perry RN    I spent a total of 35 minutes face to face with Eric Espinosa during today s office visit. Over 50% of this time was spent reading the patch tests,  discussing all the test results, correlating them to the clinical relevance, counseling the patient and/or coordinating care. Moreover time was spent to install and explain the [facility] [place]. Please see Assessment and Plan for details.

## 2021-03-05 NOTE — NURSING NOTE
Chief Complaint   Patient presents with     Allergies     Eric is here for follow up patch testing.     Nancy Samuel LPN

## 2021-03-05 NOTE — LETTER
3/5/2021         RE: Eric Espinosa  [address]   [facility] [address]        Dear Colleague,    Thank you for referring your patient, Eric Espinosa, to the [facility] [place]. Please see a copy of my visit note below.    Note for return visit for Dermato-Allergy       Encounter Date: Mar 5, 2021    History of Present Illness:  Eric Espinosa is a 65 year old male who presents in person to the consult following information has been take directly from the prior notes, patients informations, Epic and/or other sources and exam/history performed by myself:     Patient in Derm-Allergy clinic for 2nd readings and final conclusions after 4 days (in person)    Additional comments and observations from review of the patient s chart including the following:    See notes for more details    ROS: Patient generally feeling well today   Physical Examination:  General: Well-appearing, appropriately-developed individual.  - Skin: Focused examination of the skin on test sites within the consultation was performed.   See test results below  As above in HPI. No additional skin concerns.  - upper respiratory tract: currently no obvious Rhinitis  - lungs: no signs for active and present Asthma/Wheezing or coughing  - eyes: currently no active conjunctivits  - GI system/digestion: currently no problems, no bloating or Diarrhoea      Allergy Problem List:    Specialty Problems        Allergy Diagnoses    Psoriasis        Dermatitis, seborrheic            Earlier History and Allergy exams:    Patient has been on several ENTs (Dr. Cote)with constant sore throat and slight ethmoid sinusitis, no acid reflux --> started about 10 months ago  In addition patient had Balanitis and dermatitis on scalp and trunk/shoulders. On the shoulders based on pictures from September 2020 look like localized seropapules = maybe ectoparasitosis = Chiggers?    >> patient started about 10 years ago Atorvastatin and did  stop it in April.       Patient was patient of Dr. Fournier in November 2021:     1. History of NMSC  -BCC, nose, s/p Mohs surgery 2015   -BCC, L superior shoulder ([facility] 2013)  -BCC, forehead (2012 w/ Dr. Fernández in [place], [place]).   2. Actinic keratoses, forehead s/p cryo   3. Hemangioma L shoudler s/p excision 4/26/2016 (suspected lymph node)  4. Benign Bx  - IDN, central forehead, s/p shave bx 11/1/19  5 Seborrheic dermatitis  - patch on left temporal scalp with predominant scale  - Current tx: Alternate Selsan blue and head & shoulders for shampooing, lidex solution as needed (discussed 10/19/18)  6. Balanitis  - Current Tx: hydrocortisone 2.5% ointment BID PRN      Past Medical History:   Patient Active Problem List   Diagnosis     Psoriasis     Neoplasm of uncertain behavior of skin     AK (actinic keratosis)     BCC (basal cell carcinoma), trunk     Actinic keratosis     History of skin cancer     Diverticulitis     History of basal cell cancer     Dermal nevus     Dermatitis, seborrheic     Past Medical History:   Diagnosis Date     Arthritis      Autoimmune disease (H)      Basal cell carcinoma      Chronic tonsillitis      Hoarseness      Skin cancer        Allergy History:     Allergies   Allergen Reactions     Ciprofloxacin Rash       Social History:  The patient is retired. Patient has the following hobbies or non-occupational exposure      reports that he has never smoked. He has never used smokeless tobacco. He reports current alcohol use. He reports that he does not use drugs.      Family History:  Family History   Problem Relation Age of Onset     Diabetes Father      Heart Disease Father      Hypertension Mother      Cancer No family hx of         No family history of skin cancer     Skin Cancer No family hx of      Glaucoma No family hx of      Macular Degeneration No family hx of        Medications:  Current Outpatient Medications   Medication Sig Dispense Refill     ARTIFICIAL TEAR OP Apply  1 drop to eye as needed       atorvastatin (LIPITOR) 20 MG tablet Take 1 tablet (20 mg) by mouth daily (Patient not taking: Reported on 10/10/2020) 90 tablet 3     cephALEXin (KEFLEX) 500 MG capsule Take 1 capsule (500 mg) by mouth 2 times daily for 7 days 14 capsule 0     fluocinonide (LIDEX) 0.05 % solution Apply topically 2 times daily (Patient not taking: Reported on 10/10/2020) 60 mL 1     hydrocortisone 2.5 % ointment Apply topically 2 times daily To inflammation on penis 30 g 11     ibuprofen (ADVIL,MOTRIN) 200 MG tablet Take 200 mg by mouth every 4 hours as needed       MELATONIN PO Take 5 mg by mouth At Bedtime        tamsulosin (FLOMAX) 0.4 MG capsule Take 1 capsule (0.4 mg) by mouth daily 90 capsule 3     zaleplon (SONATA) 5 MG capsule Take 1 capsule (5 mg) by mouth nightly as needed for sleep 15 capsule 0     zolpidem (AMBIEN) 5 MG tablet Take 1 tablet (5 mg) by mouth nightly as needed for sleep (Patient not taking: Reported on 10/10/2020) 30 tablet 1       Allergy tests:    Past Allergy tests      Current Allergy tests (or planned)      Order for PATCH TESTS    [x] Outpatient  [] Inpatient: [facility]..../ Bed ....      Skin Atopy (atopic dermatitis) [] Yes   [x] No  Rhinitis/Sinusitis:   [x] Yes   [] No  Allergic Asthma:   [] Yes   [x] No  Food Allergy:   [] Yes   [x] No  Leg ulcers:   [] Yes   [x] No  Hand eczema:   [] Yes   [x] No   Leading hand:   [] R   [] L       [] Ambidextrous                        Reason for tests (suspected allergy): recurrent dermatitis on scalp and glans penis and maybe oral cavity  Known previous allergies: none    Standardized panels  [x] Standard panel (38 tests)  [x] Preservatives & Antimicrobials (27 tests)  [x] Emulsifiers & Additives (21 tests)   [x] Perfumes/Flavours & Plants (24 tests)  [] Hairdresser panel (12 tests)  [] Rubber Chemicals (22 tests)  [] Plastics (26 tests)  [x] Colorants/Dyes/Food additives (17 tests)  [] Metals (implants/dental) (24 tests)  [] Local  anaesthetics/NSAIDs (14 tests)  [x] Antibiotics & Antimycotics (14 tests)   [] Corticosteroids (15 tests)   [] Photopatch test (62 tests)   [] others: ...      [] Patient's own products: ...    DO NOT test if chemical or biological identity is unknown!     always ask from patient the product information and safety sheets (MSDS)     Order for PRICK TESTS    [x] Outpatient  [] Inpatient: [facility]..../ Bed ....      Skin Atopy (atopic dermatitis) [] Yes   [x] No  Contact allergies:   [x] Yes   [] No  Urticaria/Angioedema  [] Yes   [x] No  Rhinitis/Sinusitis:   [] Yes   [x] No recently some recurrent Sinusitis with yellow exudate   [] seasonal [] perennial            Allergic Asthma:   [] Yes   [x] No  Pets :  [x] Yes   [] No  Which? Had dogs for 20 years (last one  2 weeks ago)  Food Allergy:   [x] Yes   [x] No  Drug allergies:    X Yes   [] No Ciprofloxacine rash on chest    Reason for tests (suspected allergy): chronic pharyngitis with recurrent Sinusitis  Known previous allergies: none    Standardized prick panels  [x] Atopic panel (20 tests)  [] Pediatric Panel (12 tests)  [] Milk, Meat, Eggs, Grains (20 tests)   [] Dust, Epithelia, Feathers (10 tests)  [] Fish, Seafood, Shellfish (17 tests)  [] Nuts, Beans (14 tests)  [] Spice, Vegetable, Fruit (17 tests)  [] Others: ...      [] Patient's own products: ...    DO NOT test if chemical or biological identity is unknown!     always ask from patient the product information and safety sheets (MSDS)     Standardized intradermal tests  [x] Penicillium notatum [x] Aspergillus fumigatus [x] House dust mites  [] Bee venom   [] Wasp venom !!Specific protocol with dilutions!!  [] Others: ...    RESULTS & EVALUATION of PATCH TESTS    Patch test readings after     [x] 2 days, [] 3 days [x] 4 days, [] 5 days,    Applied patch tests with results (2021 applied patch tests):    STANDARD Series      No Substance 2 days 4 days remarks   1 Sumit Mix [C] - -    2 Colophony - -     3  2-Mercaptobenzothiazole  - -     4 Methylisothiazolinone - -    5 Carba Mix - +/++    6 Thiuram Mix [A] - -    7 Bisphenol A Epoxy Resin - -    8 W-Vzqn-Wkbknnjehdj-Formaldehyde Resin - -    9 Mercapto Mix [A] - -    10 Black Rubber Mix- PPD [B] - -    11 Potassium Dichromate  -  -    12 Balsam of [place] (Myroxylon Pereirae Resin) - -    13 Nickel Sulphate Hexahydrate - -    14 Mixed Dialkyl Thiourea - -    15 Paraben Mix [B] - -    16 Methyldibromo Glutaronitrile - -    17 Fragrance Mix (+) -    18 2-Bromo-2-Nitropropane-1,3-Diol (Bronopol) NA NA    19 Lyral - -    20 Tixocortol-21- Pivalate - -    21 Diazolidiyl Urea (Germall II) - -    22 Methyl Methacrylate - -    23 Cobalt (II) Chloride Hexahydrate - -    24 Fragrance Mix II  - -    25 Compositae Mix - -    26 Benzoyl Peroxide - -    27 Bacitracin - -    28 Formaldehyde - -    29 Methylchloroisothiazolinone / Methylisothiazolinone - -    30 Corticosteroid Mix NA NA    31 Sodium Lauryl Sulfate - -    32 Lanolin Alcohol - -    33 Turpentine - -    34 Cetylstearylalcohol - -    35 Chlorhexidine Dicluconate - -    36 Budenoside - -    37 Imidazolidinyl Urea  - -    38 Ethyl-2 Cyanoacrylate - -    39 Quaternium 15 (Dowicil 200) - -    40 Decyl Glucoside - -      COLORANTS, DYES, FOOD ADDITIVES   No Substance 2 days 4 days remarks   41 1 Aniline - -    42 2 Keyshawn Brown R - -    43 3 Textile Dye Mix [A] - -    44 4 [place]  - -    45 5 Disperse Blue-3 - -    46 6 Disperse [place]-3 - -    47 7 Disperse Red-11 NA NA    48 8 Disperse Yellow-9 - -    49 9 Erythrosine-B - -    50 10 Naphthol AS - -       Food additives      51 11 Aspartame - -    52 12 Azorubine - -    53 13 Brilliant Black - -    54 14 Cochineal Red - -    55 15 Patent Blue-VF NA NA    56 16 Pectin - -    57 17 Quinoline Yellow - -    58 18 Saccharin - -    59 19 Tartrazine - -    60 20 Sodium Glutamate NA NA      EMULSIFIERS & ADDITIVES   No Substance 2 days 4 days remarks   61 1 Polyethylene  Glycol-400 NA NA    62 2 Cocamidopropyl Betaine - -    63 3 Amerchol L101 - -    64 4 Propylene Glycol - -    65 5 Triethanolamine - -    66 6 Sorbitane Sesquiolate - -    67 7 Isopropylmyristate - -    68 8 Polysorbate 80  - -    69 9 Amidoamine   (Stearamidopropyl Dimethylamine) - -    70 10 Oleamidopropyl Dimethylamine - -    71 11 Lauryl Glucoside NA NA    72 12 Coconut Diethanolamide  - -    73 13 2-Hydroxy-4-Methoxy Benzophenone (Oxybenzone) - -    74 14 Benzophenone-4 (Sulisobenzon) - -    75 15 Propolis - +/++    76 16 Dexpanthenol - -    77 17 Carboxymethyl Cellulose Sodium - -    78 18 Abitol - -    79 19 Tert-Butylhydroquinone - -    80 20 Benzyl Salicylate - -      Antioxidant      81 21 Dodecyl Gallate - ++    82 22 Butylhydroxyanisole (BHA) - -    83 23 Butylhydroxytoluene (BHT) - -    84 24 Di-Alpha-Tocopherol (Vit E) - -    85 25 Propyl Gallate - -    86 26 Zinc Pyrithione NA NA    87 27 Dimethylaminopropylamin (DMAPA)        PRESERVATIVES & ANTIMICROBIALS     No Substance 2 days 4 days remarks   88 1  1,2-Benzisothiazoline-3-One, Sodium Salt - +    89 2  1,3,5-Tano (2-Hydroxyethyl) - Hexahydrotriazine (Grotan BK) - -    90 3 8-Lfoatcithriab-2-Nitro-1, 3-Propanediol - -    91 4  3, 4, 4' - Triclocarban - -    92 5 4 - Chloro - 3 - Cresol - -    93 6 4 - Chloro - 4 - Xylenol (PCMX) - -    94 7 7-Ethylbicyclooxazolidine (Bioban OQ1235) - -    95 8 Benzalkonium Chloride NA NA    96 9 Benzyl Alcohol NA NA    97 10 Cetalkonium Chloride - -    98 11 Cetylpyrimidine Chloride  - -    99 12 Chloroacetamide - -    100 13 DMDM Hydantoin NA NA    101 14 Glutaraldehyde - -    102 15 Triclosan - -    103 16 Glyoxal Trimeric Dihydrate - -    104 17 Iodopropynyl Butylcarbamate - -    105 18 Octylisothiazoline - -    106 19 Iodoform NA NA    107 20 (Nitrobutyl) Morpholine/(Ethylnitro-Trimethylene) Dimorpholine (Bioban P 1487) - -    108 21 Phenoxyethanol - -    109 22 Phenyl Salicylate - -    110 23 Povidone Iodine -  -    111 24 Sodium Benzoate - -    112 25 Sodium Disulfite - -    113 26 Sorbic Acid - -    114 27 Thimerosal - -    115 28 Melamine Formeladyde Resin      116 29 Ethylenediamine Dihydrochloride        Parabens      117 30 Butyl-P-Hydroxybenzoate - -    118 31 Ethyl-P-Hydroxybenzoate - -    119 32 Methyl-P-Hydroxybenzoate - -    120 33 Propyl-P-Hydroxybenzoate - -      PERFUMES, FLAVORS & PLANTS      No Substance 2 days 4 days remarks   121 1 Benzyl Cinnamate - -    122 2 Di-Limonene (Dipentene) - -    123 3 Cananga Odorata (Niki Prince) (I) - -    124 4 Lichen Acid Mix - -    125 5 Mentha Piperita Oil (Peppermint Oil) - -    126 6 Sesquiterpenelactone mix - -    127 7 Tea Tree Oil, Oxidized - -    128 8 Wood Tar Mix - -    129 9 Abietic Acid - -    130 10 Lavendula Angustifolia Oil (Lavender Oil) - -    131 11 Camphor  NA NA      Fragrance Mix I      132 12 [place]moss Absolute - -    133 13 Eugenol - -    134 14 Geraniol - -    135 15 Hydroxycitronellal - -    136 16 Isoeugenol - -    137 17 Cinnamic Aldehyde - -    138 18 Cinnamic Alcohol  - -      Fragrance mix II      139 19 Citronellol - -    140 20 Alpha-Hexylcinnamic Aldehyde    - -    141 21 Citral - -    142 22 Farnesol - -    143 23 Coumarin - -      ANTIBIOTICS & ANTIMYCOTICS    No Substance 2 days 4 days remarks   144 1 Erythromycine - -    145 2 Framycetine Sulphate - -    146 3 Fusidic Acid Sodium Salt - -    147 4 Gentamicin Sulphate - -    148 5 Neomycine Sulphate - -    149 6 Oxytetracycline  - -    150 7 Polymyxin B Sulphate - -    151 8 Tetracycline-HCL - -    152 9 Sulfanilamide - -    153 10 Metronidazole - -    154 11 Oxyquinoline Mix - -    155 12 Nitrofurazone - -    156 13 Nystatin - -    157 14 Clotrimazole - -        Results of patch tests:                         Interpretation:    - Negative                    A    = Allergic      (+) Erythema    TI   = Toxic/irritant   + E + Infiltration    RaP = Relevance at Present     ++ E/I +  Papulovesicle   Rpr  = Relevance Previously     +++ E/I/P + Blister     nR   = No Relevance      Atopy Screen (Placed 03/01/21 )    No Substance Readings (15 min) Evaluation   POS Histamine 1mg/ml +    NEG NaCl 0.9% -      No Substance Readings (15 min) Evaluation   1 Alternaria alternata (tenuis)  -    2 Cladosporium herbarum -    3 Aspergillus fumigatus -    4 Penicillium notatum -    5 Dermatophagoides pteronyssinus -    6 Dermatophagoides farinae -    7 Dog epithelium (canis spp) -    8 Cat hair (crow catus) -    9 Cockroach   (Blatella americana & germanica) -    10 Grass mix midwest   ([place], Orchard, Red[place], Eber) -    11 Alonso grass (sorghum halepense) -    12 Weed mix   (common Cocklebur, Lamb s quarters, rough redroot Pigweed, Dock/Sorrel) -    13 Mug wort (artemisia vulgare) -    14 Ragweed giant/short (ambrosia spp) -    15 English Plantain (plantago lanceolata) -    16 Tree mix 1 (Pecan, Maple BHR, Oak RVW, american [place], black [place]) -    17 Red cedar (juniperus virginia) -    18 Tree mix 2   (white Josel[place], river/red Birch, black [place], common [place], american Elm) -    19 Box elder/Maple mix (acer spp) -    20 [place] shagbark (carya ovata) -       -    Conclusion: no signs for positive prick tests    Intradermal Testing (Placed 03/01/21 )    No Substance Conc.  Readings (15min) Evaluation   + Histamine (prick) 0.1mg / ml +    DF Standard Dust Mite - D. Farinae 1:10 + 10mmP/E   DP Standard Dust Mite - D. Pteronyssinus 1:10 + 10mmP/E   A Aspergillus fumigatus  1:10 neg    P Penicillium notatum 1:10 neg 5mmP   Conclusion:  Slight immediate type reaction to house dust mites. After 4 days delayed type reaction with infiltrate and erythema of 6mm diameter    [x] Allergic reaction diagnosed against following allergens:  - Dodecyl Gallate ++  - Carba mix +/++  - Propolis +/++  - 1,2-Benzisothiazoline-3-One, Sodium Salt +    Interpretation/ remarks:   The Dodecyl Gallate was the most convincing  reaction and it could be really relevant inducing contact allergies as anti-oxidant and preservative in many creams and cosmetics.  There was reaction (pretty localised about 3mm, but with vesicles) to Carba mix (rubber additive) and Propolis (organic preservative from bee hives). Slight reaction as well to preservative Benzisothiazolinone, but NOT to the Methylisothiazolinone.  Moreover, delayed type reaction to the house dust mite D. Pteronyssinus, not to D. Farinae and molds. Could be an indication for atopy.    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    ==> Treatment prescribed/Plan:  >> follow exactly the recommendations of the CAMP [place] and use only skin care (and oral) products that do not contain the additives.  >> try to reduce the exposure to house dust mites (info given) and maybe try inhaled steroids. Immunotherapy to dust mites with delayed type hypersensitivity not an option.  >> try Fluticasone inhaler 1-2 times daily for 1 month       Follow-up: in Derm-Allergy clinic virtual in about 5  weeks      Thank you for the opportunity be involved in the care of this patient.     Staff:  Reema Perry RN    I spent a total of 35 minutes face to face with Eric Espinosa during today s office visit. Over 50% of this time was spent reading the patch tests, discussing all the test results, correlating them to the clinical relevance, counseling the patient and/or coordinating care. Moreover time was spent to install and explain the CAMP Keyon. Please see Assessment and Plan for details.        Again, thank you for allowing me to participate in the care of your patient.        Sincerely,        Rob Zendejas MD

## 2021-03-09 ENCOUNTER — ANCILLARY PROCEDURE (OUTPATIENT)
Dept: CT IMAGING | Facility: CLINIC | Age: 66
End: 2021-03-09
Attending: NURSE PRACTITIONER
Payer: COMMERCIAL

## 2021-03-09 DIAGNOSIS — N39.0 RECURRENT UTI: ICD-10-CM

## 2021-03-09 LAB
CREAT BLD-MCNC: 1.1 MG/DL (ref 0.66–1.25)
GFR SERPL CREATININE-BSD FRML MDRD: 68 ML/MIN/{1.73_M2}

## 2021-03-09 PROCEDURE — 74178 CT ABD&PLV WO CNTR FLWD CNTR: CPT | Performed by: RADIOLOGY

## 2021-03-09 PROCEDURE — 82565 ASSAY OF CREATININE: CPT | Performed by: PATHOLOGY

## 2021-03-09 PROCEDURE — 36415 COLL VENOUS BLD VENIPUNCTURE: CPT | Performed by: PATHOLOGY

## 2021-03-09 RX ORDER — IOPAMIDOL 755 MG/ML
105 INJECTION, SOLUTION INTRAVASCULAR ONCE
Status: COMPLETED | OUTPATIENT
Start: 2021-03-09 | End: 2021-03-09

## 2021-03-09 RX ADMIN — IOPAMIDOL 105 ML: 755 INJECTION, SOLUTION INTRAVASCULAR at 14:12

## 2021-03-17 ENCOUNTER — TELEPHONE (OUTPATIENT)
Dept: UROLOGY | Facility: CLINIC | Age: 66
End: 2021-03-17

## 2021-03-17 DIAGNOSIS — N39.0 RECURRENT UTI: Primary | ICD-10-CM

## 2021-03-17 NOTE — TELEPHONE ENCOUNTER
Patient called and is having uti symptoms again burning frequency urgency has home kit and leuko were large  Ordered uauc and told him to go to any [facility] lab Nasrin David LPN Staff Nurse

## 2021-03-31 ENCOUNTER — MYC MEDICAL ADVICE (OUTPATIENT)
Dept: INTERNAL MEDICINE | Facility: CLINIC | Age: 66
End: 2021-03-31

## 2021-03-31 ENCOUNTER — TELEPHONE (OUTPATIENT)
Dept: UROLOGY | Facility: CLINIC | Age: 66
End: 2021-03-31

## 2021-03-31 DIAGNOSIS — N39.0 RECURRENT UTI: ICD-10-CM

## 2021-03-31 LAB
ALBUMIN UR-MCNC: 100 MG/DL
APPEARANCE UR: ABNORMAL
BILIRUB UR QL STRIP: NEGATIVE
COLOR UR AUTO: ABNORMAL
GLUCOSE UR STRIP-MCNC: NEGATIVE MG/DL
HGB UR QL STRIP: ABNORMAL
KETONES UR STRIP-MCNC: NEGATIVE MG/DL
LEUKOCYTE ESTERASE UR QL STRIP: ABNORMAL
MUCOUS THREADS #/AREA URNS LPF: PRESENT /LPF
NITRATE UR QL: NEGATIVE
PH UR STRIP: 7 PH (ref 5–7)
RBC #/AREA URNS AUTO: >182 /HPF (ref 0–2)
SOURCE: ABNORMAL
SP GR UR STRIP: 1.02 (ref 1–1.03)
UROBILINOGEN UR STRIP-MCNC: 0 MG/DL (ref 0–2)
WBC #/AREA URNS AUTO: >182 /HPF (ref 0–5)

## 2021-03-31 PROCEDURE — 81001 URINALYSIS AUTO W/SCOPE: CPT | Performed by: PATHOLOGY

## 2021-03-31 PROCEDURE — 87086 URINE CULTURE/COLONY COUNT: CPT | Mod: 90 | Performed by: PATHOLOGY

## 2021-03-31 PROCEDURE — 99000 SPECIMEN HANDLING OFFICE-LAB: CPT | Performed by: PATHOLOGY

## 2021-03-31 NOTE — TELEPHONE ENCOUNTER
[facility] Call Center    Phone Message    May a detailed message be left on voicemail: yes     Reason for Call: Other: Per call from Rocío is unhappy with PT's care and is requesting cytology orders. Per Rocío PT is in a meeting until 3pm today and is requesting that Allison calls him back today after 3pm instead of the nurses to discuss orders. Please reach out to the PT directly at [phone].      Action Taken: Message routed to:  [facility] ([facility] Urology    Travel Screening: Not Applicable

## 2021-03-31 NOTE — TELEPHONE ENCOUNTER
Patient my charted that he really feels he is infected again  Orders have been placed 10 days ago for uauc  He sees flees of blood in his urine  He wants to collect his urine in an unsterilized cup all day and bring it in I told him the lab will not accept old urine . Nasrin David, ESTRELLITAN Staff Nurse

## 2021-04-01 ENCOUNTER — TELEPHONE (OUTPATIENT)
Dept: UROLOGY | Facility: CLINIC | Age: 66
End: 2021-04-01

## 2021-04-01 DIAGNOSIS — R31.0 GROSS HEMATURIA: Primary | ICD-10-CM

## 2021-04-01 DIAGNOSIS — R31.0 GROSS HEMATURIA: ICD-10-CM

## 2021-04-01 PROCEDURE — 88112 CYTOPATH CELL ENHANCE TECH: CPT | Mod: 26 | Performed by: PATHOLOGY

## 2021-04-01 NOTE — TELEPHONE ENCOUNTER
----- Message from Renetta Pavon, CNP sent at 4/1/2021 11:08 AM CDT -----  Regarding: Antibiotics  Jorge A Izquierdo,    Can you send in a prescription for this patient for Bactrim DS BID x 10 days, and contact him to let him know it was sent?     Thanks!!    Renetta

## 2021-04-02 LAB
BACTERIA SPEC CULT: NORMAL
COPATH REPORT: NORMAL
Lab: NORMAL
SPECIMEN SOURCE: NORMAL

## 2021-04-02 NOTE — TELEPHONE ENCOUNTER
(1) I recommend Eric keep his cystoscopy appt. On 4/19/2021    (2) I would like to see him in person on that same day to discuss his concerns    ARRON Garland

## 2021-04-02 NOTE — PROGRESS NOTES
Note for return visit for Dermato-Allergy       Encounter Date: Apr 6, 2021    History of Present Illness:  Eric Espinosa is a 65 year old male who presents in person to the consult following information has been take directly from the prior notes, patients informations, Epic and/or other sources and exam/history performed by myself:     Follow-up in Derm-Allergy clinic virtual in about 5 weeks    Patient did HDM reduction in bed room and mattress = finished about 2 weeks ago  Patient also used the Arnutiy Ellipta for about 1 month    ==> no major improvement of the symptoms with HDM reduction      Additional comments and observations from review of the patient s chart including the following:    See notes for more details    ROS: Patient generally feeling well today   Physical Examination:  General: Well-appearing, appropriately-developed individual.  - Skin: Focused examination of the oral cavity within the consultation was performed.   Some erythema on pharynx mucosa and around tonsillar pockets. But no signs for tonsillitis  As above in HPI. No additional skin concerns.  - upper respiratory tract: currently no obvious Rhinitis  - lungs: no signs for active and present Asthma/Wheezing or coughing  - eyes: currently no active conjunctivits  - GI system/digestion: currently no problems, no bloating or Diarrhoea    Earlier History and Allergy exams:  Patient has been on several ENTs (Dr. Cote)with constant sore throat and slight ethmoid sinusitis, no acid reflux --> started about 10 months ago  In addition patient had Balanitis and dermatitis on scalp and trunk/shoulders. On the shoulders based on pictures from September 2020 look like localized seropapules = maybe ectoparasitosis = Chiggers?    >> patient started about 10 years ago Atorvastatin and did stop it in April.       Patient was patient of Dr. Fournier in November 2021:     1. History of NMSC  -BCC, nose, s/p Mohs surgery 2015   -BCC, L superior  shoulder ([facility] 2013)  -BCC, forehead (2012 w/ Dr. Fernández in [place], [place]).   2. Actinic keratoses, forehead s/p cryo   3. Hemangioma L shoudler s/p excision 4/26/2016 (suspected lymph node)  4. Benign Bx  - IDN, central forehead, s/p shave bx 11/1/19  5 Seborrheic dermatitis  - patch on left temporal scalp with predominant scale  - Current tx: Alternate Selsan blue and head & shoulders for shampooing, lidex solution as needed (discussed 10/19/18)  6. Balanitis  - Current Tx: hydrocortisone 2.5% ointment BID PRN      Past Medical History:   Patient Active Problem List   Diagnosis     Psoriasis     Neoplasm of uncertain behavior of skin     AK (actinic keratosis)     BCC (basal cell carcinoma), trunk     Actinic keratosis     History of skin cancer     Diverticulitis     History of basal cell cancer     Dermal nevus     Dermatitis, seborrheic     Past Medical History:   Diagnosis Date     Arthritis      Autoimmune disease (H)      Basal cell carcinoma      Chronic tonsillitis      Hoarseness      Skin cancer        Allergy History:     Allergies   Allergen Reactions     Ciprofloxacin Rash       Social History:  The patient is retired. Patient has the following hobbies or non-occupational exposure      reports that he has never smoked. He has never used smokeless tobacco. He reports current alcohol use. He reports that he does not use drugs.      Family History:  Family History   Problem Relation Age of Onset     Diabetes Father      Heart Disease Father      Hypertension Mother      Cancer No family hx of         No family history of skin cancer     Skin Cancer No family hx of      Glaucoma No family hx of      Macular Degeneration No family hx of        Medications:  Current Outpatient Medications   Medication Sig Dispense Refill     ARTIFICIAL TEAR OP Apply 1 drop to eye as needed       atorvastatin (LIPITOR) 20 MG tablet Take 1 tablet (20 mg) by mouth daily (Patient not taking: Reported on 10/10/2020) 90  tablet 3     fluocinonide (LIDEX) 0.05 % solution Apply topically 2 times daily 60 mL 1     fluticasone (ARNUITY ELLIPTA) 100 MCG/ACT inhaler Inhale 1 puff into the lungs 2 times daily 1 each 3     hydrocortisone 2.5 % ointment Apply topically 2 times daily To inflammation on penis 30 g 11     ibuprofen (ADVIL,MOTRIN) 200 MG tablet Take 200 mg by mouth every 4 hours as needed       MELATONIN PO Take 5 mg by mouth At Bedtime        sulfamethoxazole-trimethoprim (BACTRIM DS) 800-160 MG tablet Take 1 tablet by mouth 2 times daily 20 tablet 0     tamsulosin (FLOMAX) 0.4 MG capsule Take 1 capsule (0.4 mg) by mouth daily 90 capsule 3     zaleplon (SONATA) 5 MG capsule Take 1 capsule (5 mg) by mouth nightly as needed for sleep 15 capsule 0     zolpidem (AMBIEN) 5 MG tablet Take 1 tablet (5 mg) by mouth nightly as needed for sleep 30 tablet 1       Allergy tests:    Past Allergy tests      Current Allergy tests (or planned)      Order for PATCH TESTS    [x] Outpatient  [] Inpatient: [facility]..../ Bed ....      Skin Atopy (atopic dermatitis) [] Yes   [x] No  Rhinitis/Sinusitis:   [x] Yes   [] No  Allergic Asthma:   [] Yes   [x] No  Food Allergy:   [] Yes   [x] No  Leg ulcers:   [] Yes   [x] No  Hand eczema:   [] Yes   [x] No   Leading hand:   [] R   [] L       [] Ambidextrous                        Reason for tests (suspected allergy): recurrent dermatitis on scalp and glans penis and maybe oral cavity  Known previous allergies: none    Standardized panels  [x] Standard panel (38 tests)  [x] Preservatives & Antimicrobials (27 tests)  [x] Emulsifiers & Additives (21 tests)   [x] Perfumes/Flavours & Plants (24 tests)  [] Hairdresser panel (12 tests)  [] Rubber Chemicals (22 tests)  [] Plastics (26 tests)  [x] Colorants/Dyes/Food additives (17 tests)  [] Metals (implants/dental) (24 tests)  [] Local anaesthetics/NSAIDs (14 tests)  [x] Antibiotics & Antimycotics (14 tests)   [] Corticosteroids (15 tests)   [] Photopatch test (62  tests)   [] others: ...      [] Patient's own products: ...    DO NOT test if chemical or biological identity is unknown!     always ask from patient the product information and safety sheets (MSDS)     Order for PRICK TESTS    [x] Outpatient  [] Inpatient: [facility]..../ Bed ....      Skin Atopy (atopic dermatitis) [] Yes   [x] No  Contact allergies:   [x] Yes   [] No  Urticaria/Angioedema  [] Yes   [x] No  Rhinitis/Sinusitis:   [] Yes   [x] No recently some recurrent Sinusitis with yellow exudate   [] seasonal [] perennial            Allergic Asthma:   [] Yes   [x] No  Pets :  [x] Yes   [] No  Which? Had dogs for 20 years (last one  2 weeks ago)  Food Allergy:   [x] Yes   [x] No  Drug allergies:    X Yes   [] No Ciprofloxacine rash on chest    Reason for tests (suspected allergy): chronic pharyngitis with recurrent Sinusitis  Known previous allergies: none    Standardized prick panels  [x] Atopic panel (20 tests)  [] Pediatric Panel (12 tests)  [] Milk, Meat, Eggs, Grains (20 tests)   [] Dust, Epithelia, Feathers (10 tests)  [] Fish, Seafood, Shellfish (17 tests)  [] Nuts, Beans (14 tests)  [] Spice, Vegetable, Fruit (17 tests)  [] Others: ...      [] Patient's own products: ...    DO NOT test if chemical or biological identity is unknown!     always ask from patient the product information and safety sheets (MSDS)     Standardized intradermal tests  [x] Penicillium notatum [x] Aspergillus fumigatus [x] House dust mites  [] Bee venom   [] Wasp venom !!Specific protocol with dilutions!!  [] Others: ...    RESULTS & EVALUATION of PATCH TESTS    Patch test readings after     [x] 2 days, [] 3 days [x] 4 days, [] 5 days,    Applied patch tests with results (2021 applied patch tests):    STANDARD Series      No Substance 2 days 4 days remarks   1 Sumit Mix [C] - -    2 Colophony - -    3  2-Mercaptobenzothiazole  - -     4 Methylisothiazolinone - -    5 Carba Mix - +/++    6 Thiuram Mix [A] - -    7 Bisphenol A  Epoxy Resin - -    8 C-Tpsf-Ivnvvyzhtgp-Formaldehyde Resin - -    9 Mercapto Mix [A] - -    10 Black Rubber Mix- PPD [B] - -    11 Potassium Dichromate  -  -    12 Balsam of [place] (Myroxylon Pereirae Resin) - -    13 Nickel Sulphate Hexahydrate - -    14 Mixed Dialkyl Thiourea - -    15 Paraben Mix [B] - -    16 Methyldibromo Glutaronitrile - -    17 Fragrance Mix (+) -    18 2-Bromo-2-Nitropropane-1,3-Diol (Bronopol) NA NA    19 Lyral - -    20 Tixocortol-21- Pivalate - -    21 Diazolidiyl Urea (Germall II) - -    22 Methyl Methacrylate - -    23 Cobalt (II) Chloride Hexahydrate - -    24 Fragrance Mix II  - -    25 Compositae Mix - -    26 Benzoyl Peroxide - -    27 Bacitracin - -    28 Formaldehyde - -    29 Methylchloroisothiazolinone / Methylisothiazolinone - -    30 Corticosteroid Mix NA NA    31 Sodium Lauryl Sulfate - -    32 Lanolin Alcohol - -    33 Turpentine - -    34 Cetylstearylalcohol - -    35 Chlorhexidine Dicluconate - -    36 Budenoside - -    37 Imidazolidinyl Urea  - -    38 Ethyl-2 Cyanoacrylate - -    39 Quaternium 15 (Dowicil 200) - -    40 Decyl Glucoside - -      COLORANTS, DYES, FOOD ADDITIVES   No Substance 2 days 4 days remarks   41 1 Aniline - -    42 2 Keyshawn Brown R - -    43 3 Textile Dye Mix [A] - -    44 4 Patterson  - -    45 5 Disperse Blue-3 - -    46 6 Disperse [place]-3 - -    47 7 Disperse Red-11 NA NA    48 8 Disperse Yellow-9 - -    49 9 Erythrosine-B - -    50 10 Naphthol AS - -       Food additives      51 11 Aspartame - -    52 12 Azorubine - -    53 13 Brilliant Black - -    54 14 Cochineal Red - -    55 15 Patent Blue-VF NA NA    56 16 Pectin - -    57 17 Quinoline Yellow - -    58 18 Saccharin - -    59 19 Tartrazine - -    60 20 Sodium Glutamate NA NA      EMULSIFIERS & ADDITIVES   No Substance 2 days 4 days remarks   61 1 Polyethylene Glycol-400 NA NA    62 2 Cocamidopropyl Betaine - -    63 3 Amerchol L101 - -    64 4 Propylene Glycol - -    65 5 Triethanolamine - -     66 6 Sorbitane Sesquiolate - -    67 7 Isopropylmyristate - -    68 8 Polysorbate 80  - -    69 9 Amidoamine   (Stearamidopropyl Dimethylamine) - -    70 10 Oleamidopropyl Dimethylamine - -    71 11 Lauryl Glucoside NA NA    72 12 Coconut Diethanolamide  - -    73 13 2-Hydroxy-4-Methoxy Benzophenone (Oxybenzone) - -    74 14 Benzophenone-4 (Sulisobenzon) - -    75 15 Propolis - +/++    76 16 Dexpanthenol - -    77 17 Carboxymethyl Cellulose Sodium - -    78 18 Abitol - -    79 19 Tert-Butylhydroquinone - -    80 20 Benzyl Salicylate - -      Antioxidant      81 21 Dodecyl Gallate - ++    82 22 Butylhydroxyanisole (BHA) - -    83 23 Butylhydroxytoluene (BHT) - -    84 24 Di-Alpha-Tocopherol (Vit E) - -    85 25 Propyl Gallate - -    86 26 Zinc Pyrithione NA NA    87 27 Dimethylaminopropylamin (DMAPA)        PRESERVATIVES & ANTIMICROBIALS     No Substance 2 days 4 days remarks   88 1  1,2-Benzisothiazoline-3-One, Sodium Salt - +    89 2  1,3,5-Tano (2-Hydroxyethyl) - Hexahydrotriazine (Grotan BK) - -    90 3 5-Geddmyfidrfbf-9-Nitro-1, 3-Propanediol - -    91 4  3, 4, 4' - Triclocarban - -    92 5 4 - Chloro - 3 - Cresol - -    93 6 4 - Chloro - 4 - Xylenol (PCMX) - -    94 7 7-Ethylbicyclooxazolidine (Bioban UR2679) - -    95 8 Benzalkonium Chloride NA NA    96 9 Benzyl Alcohol NA NA    97 10 Cetalkonium Chloride - -    98 11 Cetylpyrimidine Chloride  - -    99 12 Chloroacetamide - -    100 13 DMDM Hydantoin NA NA    101 14 Glutaraldehyde - -    102 15 Triclosan - -    103 16 Glyoxal Trimeric Dihydrate - -    104 17 Iodopropynyl Butylcarbamate - -    105 18 Octylisothiazoline - -    106 19 Iodoform NA NA    107 20 (Nitrobutyl) Morpholine/(Ethylnitro-Trimethylene) Dimorpholine (Bioban P 1487) - -    108 21 Phenoxyethanol - -    109 22 Phenyl Salicylate - -    110 23 Povidone Iodine - -    111 24 Sodium Benzoate - -    112 25 Sodium Disulfite - -    113 26 Sorbic Acid - -    114 27 Thimerosal - -    115 28 Melamine  Formeladyde Resin      116 29 Ethylenediamine Dihydrochloride        Parabens      117 30 Butyl-P-Hydroxybenzoate - -    118 31 Ethyl-P-Hydroxybenzoate - -    119 32 Methyl-P-Hydroxybenzoate - -    120 33 Propyl-P-Hydroxybenzoate - -      PERFUMES, FLAVORS & PLANTS      No Substance 2 days 4 days remarks   121 1 Benzyl Cinnamate - -    122 2 Di-Limonene (Dipentene) - -    123 3 [place] Odorata (Niki Prince) (I) - -    124 4 Lichen Acid Mix - -    125 5 Mentha Piperita Oil (Peppermint Oil) - -    126 6 Sesquiterpenelactone mix - -    127 7 Tea Tree Oil, Oxidized - -    128 8 Wood Tar Mix - -    129 9 Abietic Acid - -    130 10 Lavendula Angustifolia Oil (Lavender Oil) - -    131 11 Camphor  NA NA      Fragrance Mix I      132 12 [place] Absolute - -    133 13 Eugenol - -    134 14 Geraniol - -    135 15 Hydroxycitronellal - -    136 16 Isoeugenol - -    137 17 Cinnamic Aldehyde - -    138 18 Cinnamic Alcohol  - -      Fragrance mix II      139 19 Citronellol - -    140 20 Alpha-Hexylcinnamic Aldehyde    - -    141 21 Citral - -    142 22 Farnesol - -    143 23 Coumarin - -      ANTIBIOTICS & ANTIMYCOTICS    No Substance 2 days 4 days remarks   144 1 Erythromycine - -    145 2 Framycetine Sulphate - -    146 3 Fusidic Acid Sodium Salt - -    147 4 Gentamicin Sulphate - -    148 5 Neomycine Sulphate - -    149 6 Oxytetracycline  - -    150 7 Polymyxin B Sulphate - -    151 8 Tetracycline-HCL - -    152 9 Sulfanilamide - -    153 10 Metronidazole - -    154 11 Oxyquinoline Mix - -    155 12 Nitrofurazone - -    156 13 Nystatin - -    157 14 Clotrimazole - -      Results of patch tests:                         Interpretation:    - Negative                    A    = Allergic      (+) Erythema    TI   = Toxic/irritant   + E + Infiltration    RaP = Relevance at Present     ++ E/I + Papulovesicle   Rpr  = Relevance Previously     +++ E/I/P + Blister     nR   = No Relevance      Atopy Screen (Placed 03/01/21 )    No Substance  Readings (15 min) Evaluation   POS Histamine 1mg/ml +    NEG NaCl 0.9% -      No Substance Readings (15 min) Evaluation   1 Alternaria alternata (tenuis)  -    2 Cladosporium herbarum -    3 Aspergillus fumigatus -    4 Penicillium notatum -    5 Dermatophagoides pteronyssinus -    6 Dermatophagoides farinae -    7 Dog epithelium (canis spp) -    8 Cat hair (crow catus) -    9 Cockroach   (Blatella americana & germanica) -    10 Grass mix midwest   ([place], Or[place], [place], [place]) -    11 Alonso grass (sorghum halepense) -    12 Weed mix   (common Cocklebur, Lamb s quarters, rough redroot Pigweed, Dock/Sorrel) -    13 Mug wort (artemisia vulgare) -    14 Ragweed giant/short (ambrosia spp) -    15 English Plantain (plantago lanceolata) -    16 Tree mix 1 (Pecan, Maple BHR, Oak RVW, american [place], black [place]) -    17 Red cedar (juniperus virginia) -    18 Tree mix 2   (white [place], river/red Birch, black [place], common [place], american Elm) -    19 Box elder/Maple mix (acer spp) -    20 [place] shagbark (carya ovata) -       -    Conclusion: no signs for positive prick tests    Intradermal Testing (Placed 03/01/21 )    No Substance Conc.  Readings (15min) Evaluation   + Histamine (prick) 0.1mg / ml +    DF Standard Dust Mite - D. Farinae 1:10 + 10mmP/E   DP Standard Dust Mite - D. Pteronyssinus 1:10 + 10mmP/E   A Aspergillus fumigatus  1:10 neg    P Penicillium notatum 1:10 neg 5mmP   Conclusion:  Slight immediate type reaction to house dust mites. After 4 days delayed type reaction with infiltrate and erythema of 6mm diameter    [x] Allergic reaction diagnosed against following allergens:  - Dodecyl Gallate ++  - Carba mix +/++  - Propolis +/++  - 1,2-Benzisothiazoline-3-One, Sodium Salt +    Interpretation/ remarks:   The Dodecyl Gallate was the most convincing reaction and it could be really relevant inducing contact allergies as anti-oxidant and preservative in many creams and cosmetics.  There was  reaction (pretty localised about 3mm, but with vesicles) to Carba mix (rubber additive) and Propolis (organic preservative from bee hives). Slight reaction as well to preservative Benzisothiazolinone, but NOT to the Methylisothiazolinone.  Moreover, delayed type reaction to the house dust mite D. Pteronyssinus, not to D. Farinae and molds. Could be an indication for atopy.    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    ==> Treatment prescribed/Plan:  >> follow exactly the recommendations of the CAMP [place] and use only skin care (and oral) products that do not contain the additives.  >> try to reduce the exposure to house dust mites (info given) and maybe try inhaled steroids. Immunotherapy to dust mites with delayed type hypersensitivity not an option.    ==> use of inhaled steroids for nose and for lung did not improve the pharyngitis feeling.   >> proposed to get back to ENT (Dr. Sandoval) for in clinic visit and maybe discuss gargle with topical steroids to reduce inflammation (maybe inhaled steroids or  nasal spray not very efficient)     Follow-up: in Derm-Allergy clinic if necessary    Thank you for the opportunity be involved in the care of this patient.     Staff: Reema Perry RN    I spent a total of 20 minutes face to face with Eric Espinosa during today s office visit. Over 50% of this time was spent discussing all the individual test results, correlating them to the clinical relevance, counseling the patient and/or coordinating care. Please see Assessment and Plan for details.

## 2021-04-06 ENCOUNTER — OFFICE VISIT (OUTPATIENT)
Dept: ALLERGY | Facility: CLINIC | Age: 66
End: 2021-04-06
Payer: COMMERCIAL

## 2021-04-06 DIAGNOSIS — J02.9 ALLERGIC PHARYNGITIS: Primary | ICD-10-CM

## 2021-04-06 DIAGNOSIS — Z91.09 HOUSE DUST MITE ALLERGY: ICD-10-CM

## 2021-04-06 DIAGNOSIS — L23.89 ALLERGIC CONTACT DERMATITIS DUE TO OTHER AGENTS: ICD-10-CM

## 2021-04-06 PROCEDURE — 99213 OFFICE O/P EST LOW 20 MIN: CPT | Performed by: DERMATOLOGY

## 2021-04-06 ASSESSMENT — PAIN SCALES - GENERAL: PAINLEVEL: NO PAIN (0)

## 2021-04-06 NOTE — NURSING NOTE
Dermatology Rooming Note    Eric Espinosa's goals for this visit include:   Chief Complaint   Patient presents with     Allergy Consult     Allergic pharyngitis - Eric states he has been stable and there has been no changes     Trena Kaur CMA

## 2021-04-06 NOTE — LETTER
4/6/2021         RE: Eric Espinosa  [address]   [facility] [address]        Dear Colleague,    Thank you for referring your patient, Eric Espinosa, to the [facility] [place]. Please see a copy of my visit note below.    Note for return visit for Dermato-Allergy       Encounter Date: Apr 6, 2021    History of Present Illness:  Eric Espinosa is a 65 year old male who presents in person to the consult following information has been take directly from the prior notes, patients informations, Epic and/or other sources and exam/history performed by myself:     Follow-up in Derm-Allergy clinic virtual in about 5 weeks    Patient did HDM reduction in bed room and mattress = finished about 2 weeks ago  Patient also used the Arnutiy Ellipta for about 1 month    ==> no major improvement of the symptoms with HDM reduction      Additional comments and observations from review of the patient s chart including the following:    See notes for more details    ROS: Patient generally feeling well today   Physical Examination:  General: Well-appearing, appropriately-developed individual.  - Skin: Focused examination of the oral cavity within the consultation was performed.   Some erythema on pharynx mucosa and around tonsillar pockets. But no signs for tonsillitis  As above in HPI. No additional skin concerns.  - upper respiratory tract: currently no obvious Rhinitis  - lungs: no signs for active and present Asthma/Wheezing or coughing  - eyes: currently no active conjunctivits  - GI system/digestion: currently no problems, no bloating or Diarrhoea    Earlier History and Allergy exams:  Patient has been on several ENTs (Dr. Cote)with constant sore throat and slight ethmoid sinusitis, no acid reflux --> started about 10 months ago  In addition patient had Balanitis and dermatitis on scalp and trunk/shoulders. On the shoulders based on pictures from September 2020 look like localized  seropapules = maybe ectoparasitosis = Chiggers?    >> patient started about 10 years ago Atorvastatin and did stop it in April.       Patient was patient of Dr. Fournier in November 2021:     1. History of NMSC  -BCC, nose, s/p Mohs surgery 2015   -BCC, L superior shoulder ([facility] 2013)  -BCC, forehead (2012 w/ Dr. Fernández in [place], [place]).   2. Actinic keratoses, forehead s/p cryo   3. Hemangioma L shoudler s/p excision 4/26/2016 (suspected lymph node)  4. Benign Bx  - IDN, central forehead, s/p shave bx 11/1/19  5 Seborrheic dermatitis  - patch on left temporal scalp with predominant scale  - Current tx: Alternate Selsan blue and head & shoulders for shampooing, lidex solution as needed (discussed 10/19/18)  6. Balanitis  - Current Tx: hydrocortisone 2.5% ointment BID PRN      Past Medical History:   Patient Active Problem List   Diagnosis     Psoriasis     Neoplasm of uncertain behavior of skin     AK (actinic keratosis)     BCC (basal cell carcinoma), trunk     Actinic keratosis     History of skin cancer     Diverticulitis     History of basal cell cancer     Dermal nevus     Dermatitis, seborrheic     Past Medical History:   Diagnosis Date     Arthritis      Autoimmune disease (H)      Basal cell carcinoma      Chronic tonsillitis      Hoarseness      Skin cancer        Allergy History:     Allergies   Allergen Reactions     Ciprofloxacin Rash       Social History:  The patient is retired. Patient has the following hobbies or non-occupational exposure      reports that he has never smoked. He has never used smokeless tobacco. He reports current alcohol use. He reports that he does not use drugs.      Family History:  Family History   Problem Relation Age of Onset     Diabetes Father      Heart Disease Father      Hypertension Mother      Cancer No family hx of         No family history of skin cancer     Skin Cancer No family hx of      Glaucoma No family hx of      Macular Degeneration No family hx of         Medications:  Current Outpatient Medications   Medication Sig Dispense Refill     ARTIFICIAL TEAR OP Apply 1 drop to eye as needed       atorvastatin (LIPITOR) 20 MG tablet Take 1 tablet (20 mg) by mouth daily (Patient not taking: Reported on 10/10/2020) 90 tablet 3     fluocinonide (LIDEX) 0.05 % solution Apply topically 2 times daily 60 mL 1     fluticasone (ARNUITY ELLIPTA) 100 MCG/ACT inhaler Inhale 1 puff into the lungs 2 times daily 1 each 3     hydrocortisone 2.5 % ointment Apply topically 2 times daily To inflammation on penis 30 g 11     ibuprofen (ADVIL,MOTRIN) 200 MG tablet Take 200 mg by mouth every 4 hours as needed       MELATONIN PO Take 5 mg by mouth At Bedtime        sulfamethoxazole-trimethoprim (BACTRIM DS) 800-160 MG tablet Take 1 tablet by mouth 2 times daily 20 tablet 0     tamsulosin (FLOMAX) 0.4 MG capsule Take 1 capsule (0.4 mg) by mouth daily 90 capsule 3     zaleplon (SONATA) 5 MG capsule Take 1 capsule (5 mg) by mouth nightly as needed for sleep 15 capsule 0     zolpidem (AMBIEN) 5 MG tablet Take 1 tablet (5 mg) by mouth nightly as needed for sleep 30 tablet 1       Allergy tests:    Past Allergy tests      Current Allergy tests (or planned)      Order for PATCH TESTS    [x] Outpatient  [] Inpatient: [facility]..../ Bed ....      Skin Atopy (atopic dermatitis) [] Yes   [x] No  Rhinitis/Sinusitis:   [x] Yes   [] No  Allergic Asthma:   [] Yes   [x] No  Food Allergy:   [] Yes   [x] No  Leg ulcers:   [] Yes   [x] No  Hand eczema:   [] Yes   [x] No   Leading hand:   [] R   [] L       [] Ambidextrous                        Reason for tests (suspected allergy): recurrent dermatitis on scalp and glans penis and maybe oral cavity  Known previous allergies: none    Standardized panels  [x] Standard panel (38 tests)  [x] Preservatives & Antimicrobials (27 tests)  [x] Emulsifiers & Additives (21 tests)   [x] Perfumes/Flavours & Plants (24 tests)  [] Hairdresser panel (12 tests)  [] Rubber Chemicals  (22 tests)  [] Plastics (26 tests)  [x] Colorants/Dyes/Food additives (17 tests)  [] Metals (implants/dental) (24 tests)  [] Local anaesthetics/NSAIDs (14 tests)  [x] Antibiotics & Antimycotics (14 tests)   [] Corticosteroids (15 tests)   [] Photopatch test (62 tests)   [] others: ...      [] Patient's own products: ...    DO NOT test if chemical or biological identity is unknown!     always ask from patient the product information and safety sheets (MSDS)     Order for PRICK TESTS    [x] Outpatient  [] Inpatient: Xiao..../ Bed ....      Skin Atopy (atopic dermatitis) [] Yes   [x] No  Contact allergies:   [x] Yes   [] No  Urticaria/Angioedema  [] Yes   [x] No  Rhinitis/Sinusitis:   [] Yes   [x] No recently some recurrent Sinusitis with yellow exudate   [] seasonal [] perennial            Allergic Asthma:   [] Yes   [x] No  Pets :  [x] Yes   [] No  Which? Had dogs for 20 years (last one  2 weeks ago)  Food Allergy:   [x] Yes   [x] No  Drug allergies:    X Yes   [] No Ciprofloxacine rash on chest    Reason for tests (suspected allergy): chronic pharyngitis with recurrent Sinusitis  Known previous allergies: none    Standardized prick panels  [x] Atopic panel (20 tests)  [] Pediatric Panel (12 tests)  [] Milk, Meat, Eggs, Grains (20 tests)   [] Dust, Epithelia, Feathers (10 tests)  [] Fish, Seafood, Shellfish (17 tests)  [] Nuts, Beans (14 tests)  [] Spice, Vegetable, Fruit (17 tests)  [] Others: ...      [] Patient's own products: ...    DO NOT test if chemical or biological identity is unknown!     always ask from patient the product information and safety sheets (MSDS)     Standardized intradermal tests  [x] Penicillium notatum [x] Aspergillus fumigatus [x] House dust mites  [] Bee venom   [] Wasp venom !!Specific protocol with dilutions!!  [] Others: ...    RESULTS & EVALUATION of PATCH TESTS    Patch test readings after     [x] 2 days, [] 3 days [x] 4 days, [] 5 days,    Applied patch tests with results  (03/01/2021 applied patch tests):    STANDARD Series      No Substance 2 days 4 days remarks   1 Sumit Mix [C] - -    2 Colophony - -    3  2-Mercaptobenzothiazole  - -     4 Methylisothiazolinone - -    5 Carba Mix - +/++    6 Thiuram Mix [A] - -    7 Bisphenol A Epoxy Resin - -    8 H-Bhmt-Wqiiaaveflb-Formaldehyde Resin - -    9 Mercapto Mix [A] - -    10 Black Rubber Mix- PPD [B] - -    11 Potassium Dichromate  -  -    12 Balsam of [place] (Myroxylon Pereirae Resin) - -    13 Nickel Sulphate Hexahydrate - -    14 Mixed Dialkyl Thiourea - -    15 Paraben Mix [B] - -    16 Methyldibromo Glutaronitrile - -    17 Fragrance Mix (+) -    18 2-Bromo-2-Nitropropane-1,3-Diol (Bronopol) NA NA    19 Lyral - -    20 Tixocortol-21- Pivalate - -    21 Diazolidiyl Urea (Germall II) - -    22 Methyl Methacrylate - -    23 Cobalt (II) Chloride Hexahydrate - -    24 Fragrance Mix II  - -    25 Compositae Mix - -    26 Benzoyl Peroxide - -    27 Bacitracin - -    28 Formaldehyde - -    29 Methylchloroisothiazolinone / Methylisothiazolinone - -    30 Corticosteroid Mix NA NA    31 Sodium Lauryl Sulfate - -    32 Lanolin Alcohol - -    33 Turpentine - -    34 Cetylstearylalcohol - -    35 Chlorhexidine Dicluconate - -    36 Budenoside - -    37 Imidazolidinyl Urea  - -    38 Ethyl-2 Cyanoacrylate - -    39 Quaternium 15 (Dowicil 200) - -    40 Decyl Glucoside - -      COLORANTS, DYES, FOOD ADDITIVES   No Substance 2 days 4 days remarks   41 1 Aniline - -    42 2 Keyshawn Brown R - -    43 3 Textile Dye Mix [A] - -    44 4 Farwell  - -    45 5 Disperse Blue-3 - -    46 6 Disperse Midland-3 - -    47 7 Disperse Red-11 NA NA    48 8 Disperse Yellow-9 - -    49 9 Erythrosine-B - -    50 10 Naphthol AS - -       Food additives      51 11 Aspartame - -    52 12 Azorubine - -    53 13 Brilliant Black - -    54 14 Cochineal Red - -    55 15 Patent Blue-VF NA NA    56 16 Pectin - -    57 17 Quinoline Yellow - -    58 18 Saccharin - -    59 19  Tartrazine - -    60 20 Sodium Glutamate NA NA      EMULSIFIERS & ADDITIVES   No Substance 2 days 4 days remarks   61 1 Polyethylene Glycol-400 NA NA    62 2 Cocamidopropyl Betaine - -    63 3 Amerchol L101 - -    64 4 Propylene Glycol - -    65 5 Triethanolamine - -    66 6 Sorbitane Sesquiolate - -    67 7 Isopropylmyristate - -    68 8 Polysorbate 80  - -    69 9 Amidoamine   (Stearamidopropyl Dimethylamine) - -    70 10 Oleamidopropyl Dimethylamine - -    71 11 Lauryl Glucoside NA NA    72 12 Coconut Diethanolamide  - -    73 13 2-Hydroxy-4-Methoxy Benzophenone (Oxybenzone) - -    74 14 Benzophenone-4 (Sulisobenzon) - -    75 15 Propolis - +/++    76 16 Dexpanthenol - -    77 17 Carboxymethyl Cellulose Sodium - -    78 18 Abitol - -    79 19 Tert-Butylhydroquinone - -    80 20 Benzyl Salicylate - -      Antioxidant      81 21 Dodecyl Gallate - ++    82 22 Butylhydroxyanisole (BHA) - -    83 23 Butylhydroxytoluene (BHT) - -    84 24 Di-Alpha-Tocopherol (Vit E) - -    85 25 Propyl Gallate - -    86 26 Zinc Pyrithione NA NA    87 27 Dimethylaminopropylamin (DMAPA)        PRESERVATIVES & ANTIMICROBIALS     No Substance 2 days 4 days remarks   88 1  1,2-Benzisothiazoline-3-One, Sodium Salt - +    89 2  1,3,5-Tano (2-Hydroxyethyl) - Hexahydrotriazine (Grotan BK) - -    90 3 9-Wqaxdnxilfiql-1-Nitro-1, 3-Propanediol - -    91 4  3, 4, 4' - Triclocarban - -    92 5 4 - Chloro - 3 - Cresol - -    93 6 4 - Chloro - 4 - Xylenol (PCMX) - -    94 7 7-Ethylbicyclooxazolidine (Bioban [id number]) - -    95 8 Benzalkonium Chloride NA NA    96 9 Benzyl Alcohol NA NA    97 10 Cetalkonium Chloride - -    98 11 Cetylpyrimidine Chloride  - -    99 12 Chloroacetamide - -    100 13 DMDM Hydantoin NA NA    101 14 Glutaraldehyde - -    102 15 Triclosan - -    103 16 Glyoxal Trimeric Dihydrate - -    104 17 Iodopropynyl Butylcarbamate - -    105 18 Octylisothiazoline - -    106 19 Iodoform NA NA    107 20 (Nitrobutyl)  Morpholine/(Ethylnitro-Trimethylene) Dimorpholine (Bioban P 1487) - -    108 21 Phenoxyethanol - -    109 22 Phenyl Salicylate - -    110 23 Povidone Iodine - -    111 24 Sodium Benzoate - -    112 25 Sodium Disulfite - -    113 26 Sorbic Acid - -    114 27 Thimerosal - -    115 28 Melamine Formeladyde Resin      116 29 Ethylenediamine Dihydrochloride        Parabens      117 30 Butyl-P-Hydroxybenzoate - -    118 31 Ethyl-P-Hydroxybenzoate - -    119 32 Methyl-P-Hydroxybenzoate - -    120 33 Propyl-P-Hydroxybenzoate - -      PERFUMES, FLAVORS & PLANTS      No Substance 2 days 4 days remarks   121 1 Benzyl Cinnamate - -    122 2 Di-Limonene (Dipentene) - -    123 3 Cananga Odorata (Niki Prince) (I) - -    124 4 Lichen Acid Mix - -    125 5 Mentha Piperita Oil (Peppermint Oil) - -    126 6 Sesquiterpenelactone mix - -    127 7 Tea Tree Oil, Oxidized - -    128 8 Wood Tar Mix - -    129 9 Abietic Acid - -    130 10 Lavendula Angustifolia Oil (Lavender Oil) - -    131 11 Camphor  NA NA      Fragrance Mix I      132 12 Oakmoss Absolute - -    133 13 Eugenol - -    134 14 Geraniol - -    135 15 Hydroxycitronellal - -    136 16 Isoeugenol - -    137 17 Cinnamic Aldehyde - -    138 18 Cinnamic Alcohol  - -      Fragrance mix II      139 19 Citronellol - -    140 20 Alpha-Hexylcinnamic Aldehyde    - -    141 21 Citral - -    142 22 Farnesol - -    143 23 Coumarin - -      ANTIBIOTICS & ANTIMYCOTICS    No Substance 2 days 4 days remarks   144 1 Erythromycine - -    145 2 Framycetine Sulphate - -    146 3 Fusidic Acid Sodium Salt - -    147 4 Gentamicin Sulphate - -    148 5 Neomycine Sulphate - -    149 6 Oxytetracycline  - -    150 7 Polymyxin B Sulphate - -    151 8 Tetracycline-HCL - -    152 9 Sulfanilamide - -    153 10 Metronidazole - -    154 11 Oxyquinoline Mix - -    155 12 Nitrofurazone - -    156 13 Nystatin - -    157 14 Clotrimazole - -      Results of patch tests:                          Interpretation:    - Negative                    A    = Allergic      (+) Erythema    TI   = Toxic/irritant   + E + Infiltration    RaP = Relevance at Present     ++ E/I + Papulovesicle   Rpr  = Relevance Previously     +++ E/I/P + Blister     nR   = No Relevance      Atopy Screen (Placed 03/01/21 )    No Substance Readings (15 min) Evaluation   POS Histamine 1mg/ml +    NEG NaCl 0.9% -      No Substance Readings (15 min) Evaluation   1 Alternaria alternata (tenuis)  -    2 Cladosporium herbarum -    3 Aspergillus fumigatus -    4 Penicillium notatum -    5 Dermatophagoides pteronyssinus -    6 Dermatophagoides farinae -    7 Dog epithelium (canis spp) -    8 Cat hair (crow catus) -    9 Cockroach   (Blatella americana & germanica) -    10 Grass mix midwest   ([place], Orchard, Red[place], Eber) -    11 Alonso grass (sorghum halepense) -    12 Weed mix   (common Cocklebur, Lamb s quarters, rough redroot Pigweed, Dock/Sorrel) -    13 Mug wort (artemisia vulgare) -    14 Ragweed giant/short (ambrosia spp) -    15 English Plantain (plantago lanceolata) -    16 Tree mix 1 (Pecan, Maple BHR, Oak RVW, american [place], black [place]) -    17 Red cedar (juniperus virginia) -    18 Tree mix 2   (white [place], river/red Birch, black [place], common [place], american Elm) -    19 Box elder/Maple mix (acer spp) -    20 [place] shagbark (carya ovata) -       -    Conclusion: no signs for positive prick tests    Intradermal Testing (Placed 03/01/21 )    No Substance Conc.  Readings (15min) Evaluation   + Histamine (prick) 0.1mg / ml +    DF Standard Dust Mite - D. Farinae 1:10 + 10mmP/E   DP Standard Dust Mite - D. Pteronyssinus 1:10 + 10mmP/E   A Aspergillus fumigatus  1:10 neg    P Penicillium notatum 1:10 neg 5mmP   Conclusion:  Slight immediate type reaction to house dust mites. After 4 days delayed type reaction with infiltrate and erythema of 6mm diameter    [x] Allergic reaction diagnosed against following allergens:  -  Dodecyl Gallate ++  - Carba mix +/++  - Propolis +/++  - 1,2-Benzisothiazoline-3-One, Sodium Salt +    Interpretation/ remarks:   The Dodecyl Gallate was the most convincing reaction and it could be really relevant inducing contact allergies as anti-oxidant and preservative in many creams and cosmetics.  There was reaction (pretty localised about 3mm, but with vesicles) to Carba mix (rubber additive) and Propolis (organic preservative from bee hives). Slight reaction as well to preservative Benzisothiazolinone, but NOT to the Methylisothiazolinone.  Moreover, delayed type reaction to the house dust mite D. Pteronyssinus, not to D. Farinae and molds. Could be an indication for atopy.    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    ==> Treatment prescribed/Plan:  >> follow exactly the recommendations of the [facility] [place] and use only skin care (and oral) products that do not contain the additives.  >> try to reduce the exposure to house dust mites (info given) and maybe try inhaled steroids. Immunotherapy to dust  mites with delayed type hypersensitivity not an option.    ==> use of inhaled steroids for nose and for lung did not improve the pharyngitis feeling.   >> proposed to get back to ENT (Dr. Sandoval) for in clinic visit and maybe discuss gargle with topical steroids to reduce inflammation (maybe inhaled steroids or nasal spray not very efficient)     Follow-up: in Derm-Allergy clinic if necessary    Thank you for the opportunity be involved in the care of this patient.     Staff: Reema Perry RN    I spent a total of 20 minutes face to face with Eric Espinosa during today s office visit. Over 50% of this time was spent discussing all the individual test results, correlating them to the clinical relevance, counseling the patient and/or coordinating care. Please see Assessment and Plan for details.       Again, thank you for allowing me to participate in the care of your patient.        Sincerely,        Rob Zendejas MD

## 2021-04-12 ENCOUNTER — PRE VISIT (OUTPATIENT)
Dept: UROLOGY | Facility: CLINIC | Age: 66
End: 2021-04-12

## 2021-04-12 NOTE — TELEPHONE ENCOUNTER
Reason for visit: cystoscopy    Dx/Hx/Sx: Padmini    Records/imaging/labs/orders: avail    Pt called: NA    At Rooming: standard

## 2021-04-17 ENCOUNTER — IMMUNIZATION (OUTPATIENT)
Dept: NURSING | Facility: CLINIC | Age: 66
End: 2021-04-17
Payer: COMMERCIAL

## 2021-04-17 PROCEDURE — 0011A PR COVID VAC MODERNA 100 MCG/0.5 ML IM: CPT

## 2021-04-17 PROCEDURE — 91301 PR COVID VAC MODERNA 100 MCG/0.5 ML IM: CPT

## 2021-04-19 ENCOUNTER — OFFICE VISIT (OUTPATIENT)
Dept: UROLOGY | Facility: CLINIC | Age: 66
End: 2021-04-19
Payer: COMMERCIAL

## 2021-04-19 VITALS
DIASTOLIC BLOOD PRESSURE: 79 MMHG | HEIGHT: 70 IN | SYSTOLIC BLOOD PRESSURE: 123 MMHG | WEIGHT: 172 LBS | HEART RATE: 74 BPM | BODY MASS INDEX: 24.62 KG/M2

## 2021-04-19 DIAGNOSIS — N40.1 BENIGN PROSTATIC HYPERPLASIA WITH URINARY HESITANCY: ICD-10-CM

## 2021-04-19 DIAGNOSIS — R39.11 BENIGN PROSTATIC HYPERPLASIA WITH URINARY HESITANCY: ICD-10-CM

## 2021-04-19 DIAGNOSIS — R31.0 GROSS HEMATURIA: Primary | ICD-10-CM

## 2021-04-19 DIAGNOSIS — N39.0 RECURRENT UTI: ICD-10-CM

## 2021-04-19 DIAGNOSIS — N34.2 URETHRITIS: ICD-10-CM

## 2021-04-19 PROCEDURE — 52000 CYSTOURETHROSCOPY: CPT | Performed by: STUDENT IN AN ORGANIZED HEALTH CARE EDUCATION/TRAINING PROGRAM

## 2021-04-19 RX ORDER — LIDOCAINE HYDROCHLORIDE 20 MG/ML
JELLY TOPICAL ONCE
Status: COMPLETED | OUTPATIENT
Start: 2021-04-19 | End: 2021-04-19

## 2021-04-19 RX ADMIN — LIDOCAINE HYDROCHLORIDE: 20 JELLY TOPICAL at 12:11

## 2021-04-19 ASSESSMENT — PAIN SCALES - GENERAL: PAINLEVEL: NO PAIN (0)

## 2021-04-19 ASSESSMENT — MIFFLIN-ST. JEOR: SCORE: 1581.44

## 2021-04-19 NOTE — LETTER
4/19/2021       RE: Eric Espinosa  [address]   [facility] [address]     Dear Colleague,    Thank you for referring your patient, Eric Espinosa, to the [facility]OLOGY [facility] [place] at [facility]. Please see a copy of my visit note below.    Cystoscopy Note    PRE-PROCEDURE DIAGNOSIS: Recurrent UTIs, hematuria     POST-PROCEDURE DIAGNOSIS: same    PROCEDURE: Cystoscopy    HISTORY: Mr. Resendiz is a 65 year old male with a history of LUTS and recurrent UTIs. He is referred from [facility] for cystoscopy to evaluate for anatomical cause of UTIs and hematuria.     He has had 6 UTIs since last summer and recently had gross hematuria associated with some bacteruria. Reports that he feels like he empties completely, has urethral pain occasionally at the distal urethra. He also reports a several year history of intermittent balanitis.  He is uncircumcised.  He has been using hydrocortisone 2.5% to the area.  He reports that it is better than its worst.     UCx: 3/31  Specimen Description Midstream Urine    Special Requests Specimen received in preservative    Culture Micro 10,000 to 50,000 colonies/mL   mixed urogenital cassia      Hematuria workup?   Cytology: 4/1  Urine, voided:   Negative for High-Grade Urothelial Carcinoma   Crystals present.   Acute inflammation present.   Specimen Adequacy: Satisfactory for evaluation.     CTU: 3/9  IMPRESSION:   1.  No urinary system calculi. No suspicious renal masses or urothelial lesions in the upper tracts. Partially distended urinary bladder without focal masses, wall thickening or diverticuli.     DESCRIPTION OF PROCEDURE:  After informed consent was obtained, the patient was brought to the procedure room where they were placed in the modified lithotomy position with all pressure points well padded. The patient was prepped and draped in a sterile fashion. There was no evidence of balanitis.Glans skin and  foreskin was thinned. Mild irritation at urethral meatus. Meatus patent. A flexible cystoscope was introduced through a well-lubricated urethra. There were no urethral strictures. The bladder was entered. The urine was clear. Systematic cystoscopy was performed. There were no tumors, stones, or other abnormalities in the bladder. Retroflexion showed minimal median lobe. The sphincter was identified and the scope withdrawn. Patient tolerated the procedure well.     ASSESSMENT AND PLAN:  Hx of LUTS and UTI associated with hematuria and balanitis. The cystoscopy today was normal.     #hx of balanitis. No evidence on exam today  - Stop hydrocortisol cream  - If recurrence or worsening, can consider circumcision, possible biopsy   - Eval at next appt in 1 month    #Recurrent UTIs/Hematuria. No clear indication for UTIs or hematuria.  The hematuria work-up. Rule out prostatitis if recurrent symptoms given prostatic calcifications on   - Follow up with Renetta PHELPS for flow, PVR  - Repeat hematuria workup yearly    Jil Galaviz MD  Reconstructive Urology

## 2021-04-19 NOTE — PROGRESS NOTES
Cystoscopy Note    PRE-PROCEDURE DIAGNOSIS: Recurrent UTIs, hematuria     POST-PROCEDURE DIAGNOSIS: same    PROCEDURE: Cystoscopy    HISTORY: Mr. Resendiz is a 65 year old male with a history of LUTS and recurrent UTIs. He is referred from [facility] for cystoscopy to evaluate for anatomical cause of UTIs and hematuria.     He has had 6 UTIs since last summer and recently had gross hematuria associated with some bacteruria. Reports that he feels like he empties completely, has urethral pain occasionally at the distal urethra. He also reports a several year history of intermittent balanitis.  He is uncircumcised.  He has been using hydrocortisone 2.5% to the area.  He reports that it is better than its worst.     UCx: 3/31  Specimen Description Midstream Urine    Special Requests Specimen received in preservative    Culture Micro 10,000 to 50,000 colonies/mL   mixed urogenital cassia      Hematuria workup?   Cytology: 4/1  Urine, voided:   Negative for High-Grade Urothelial Carcinoma   Crystals present.   Acute inflammation present.   Specimen Adequacy: Satisfactory for evaluation.     CTU: 3/9  IMPRESSION:   1.  No urinary system calculi. No suspicious renal masses or urothelial lesions in the upper tracts. Partially distended urinary bladder without focal masses, wall thickening or diverticuli.     DESCRIPTION OF PROCEDURE:  After informed consent was obtained, the patient was brought to the procedure room where they were placed in the modified lithotomy position with all pressure points well padded. The patient was prepped and draped in a sterile fashion. There was no evidence of balanitis.Glans skin and foreskin was thinned. Mild irritation at urethral meatus. Meatus patent. A flexible cystoscope was introduced through a well-lubricated urethra. There were no urethral strictures. The bladder was entered. The urine was clear. Systematic cystoscopy was performed. There were no tumors, stones, or other abnormalities  in the bladder. Retroflexion showed minimal median lobe. The sphincter was identified and the scope withdrawn. Patient tolerated the procedure well.     ASSESSMENT AND PLAN:  Hx of LUTS and UTI associated with hematuria and balanitis. The cystoscopy today was normal.     #hx of balanitis. No evidence on exam today  - Stop hydrocortisol cream  - If recurrence or worsening, can consider circumcision, possible biopsy   - Eval at next appt in 1 month    #Recurrent UTIs/Hematuria. No clear indication for UTIs or hematuria.  The hematuria work-up. Rule out prostatitis if recurrent symptoms given prostatic calcifications on   - Follow up with Renetta PHELPS for flow, PVR  - Repeat hematuria workup yearly    Jil Galaviz MD  [facility]

## 2021-04-19 NOTE — PATIENT INSTRUCTIONS

## 2021-04-19 NOTE — NURSING NOTE
"Chief Complaint   Patient presents with     Cystoscopy     recurrent UTI's and two occurrences of gross hematuria in the last month       Blood pressure 123/79, pulse 74, height 1.778 m (5' 10\"), weight 78 kg (172 lb). Body mass index is 24.68 kg/m .    Patient Active Problem List   Diagnosis     Psoriasis     Neoplasm of uncertain behavior of skin     AK (actinic keratosis)     BCC (basal cell carcinoma), trunk     Actinic keratosis     History of skin cancer     Diverticulitis     History of basal cell cancer     Dermal nevus     Dermatitis, seborrheic       Allergies   Allergen Reactions     Ciprofloxacin Rash       Current Outpatient Medications   Medication Sig Dispense Refill     ARTIFICIAL TEAR OP Apply 1 drop to eye as needed       fluocinonide (LIDEX) 0.05 % solution Apply topically 2 times daily 60 mL 1     hydrocortisone 2.5 % ointment Apply topically 2 times daily To inflammation on penis 30 g 11     ibuprofen (ADVIL,MOTRIN) 200 MG tablet Take 200 mg by mouth every 4 hours as needed       MELATONIN PO Take 5 mg by mouth At Bedtime        tamsulosin (FLOMAX) 0.4 MG capsule Take 1 capsule (0.4 mg) by mouth daily 90 capsule 3     zaleplon (SONATA) 5 MG capsule Take 1 capsule (5 mg) by mouth nightly as needed for sleep 15 capsule 0     zolpidem (AMBIEN) 5 MG tablet Take 1 tablet (5 mg) by mouth nightly as needed for sleep 30 tablet 1     atorvastatin (LIPITOR) 20 MG tablet Take 1 tablet (20 mg) by mouth daily (Patient not taking: Reported on 10/10/2020) 90 tablet 3     sulfamethoxazole-trimethoprim (BACTRIM DS) 800-160 MG tablet Take 1 tablet by mouth 2 times daily 20 tablet 0       Social History     Tobacco Use     Smoking status: Never Smoker     Smokeless tobacco: Never Used   Substance Use Topics     Alcohol use: Yes     Drug use: No       Invasive Procedure Safety Checklist:    Procedure: Cystoscopy    Action: Complete sections and checkboxes as appropriate.    Pre-procedure:  1. Patient ID Verified " with 2 identifiers (Shaun[place] and  or MRN) : YES    2. Procedure and site verified with patient/designee (when able) : YES    3. Accurate consent documentation in medical record : YES    4. H&P (or appropriate assessment) documented in medical record : N/A  H&P must be up to 30 days prior to procedure an updated within 24 hours of                 Procedure as applicable.     5. Relevant diagnostic and radiology test results appropriately labeled and displayed as applicable : YES    6. Blood products, implants, devices, and/or special equipment available for the procedure as applicable : YES    7. Procedure site(s) marked with provider initials [Exclusions: none] : NO    8. Marking not required. Reason : Yes  Procedure does not require site marking    Time Out:     Time-Out performed immediately prior to starting procedure, including verbal and active participation of all team members addressing: YES    1. Correct patient identity.  2. Confirmed that the correct side and site are marked.  3. An accurate procedure to be done.  4. Agreement on the procedure to be done.  5. Correct patient position.  6. Relevant images and results are properly labeled and appropriately displayed.  7. The need to administer antibiotics or fluids for irrigation purposes during the procedure as applicable.  8. Safety precautions based on patient history or medication use.    During Procedure: Verification of correct person, site, and procedure occurs any time the responsibility for care of the patient is transferred to another member of the care team.    The following medication was given:     MEDICATION:  Lidocaine without epinephrine 2% jelly  ROUTE: urethral   SITE: urethral   DOSE: 10 mL  LOT #: [id number]  : [employer]  EXPIRATION DATE:   NDC#: [address]-8   Was there drug waste? No    Prior to med admin, verified patient identity using patient's name and date of birth.  Due to med  administration, patient instructed to remain in clinic for 15 minutes  afterwards, and to report any adverse reaction to me immediately.    Drug Amount Wasted:  None.  Vial/Syringe: KEYON Quintero, EMT  4/19/2021  12:10 PM

## 2021-05-15 ENCOUNTER — IMMUNIZATION (OUTPATIENT)
Dept: NURSING | Facility: CLINIC | Age: 66
End: 2021-05-15
Payer: COMMERCIAL

## 2021-05-15 PROCEDURE — 91301 PR COVID VAC MODERNA 100 MCG/0.5 ML IM: CPT

## 2021-05-15 PROCEDURE — 0012A PR COVID VAC MODERNA 100 MCG/0.5 ML IM: CPT

## 2021-05-17 ENCOUNTER — PRE VISIT (OUTPATIENT)
Dept: UROLOGY | Facility: CLINIC | Age: 66
End: 2021-05-17

## 2021-05-17 NOTE — PROGRESS NOTES
Chief Complaint:   LUTS, recurrent UTI         History of Present Illness:    Eric Espinosa is a 65 year old male who presents for follow up regarding mixed LUTS (urinary frequency, nocturia, hesitancy, urgency, UUI, intermittency, weak stream, and sensation of incomplete emptying), as well as recurrent E.coli UTIs despite appropriate antibiotic treatment courses. Has also had longstanding balanitis, for which he had used for antifungal creams with no improvement, as well as hydrocortisone cream with some improvement.     Due to his recurrent infections, a CT urogram was obtained on 3/9/21 and was largely normal with no urologic abnormality. Urine cytology was obtained a few weeks later due to reported passage of tissue pieces, and was positive for crystals and inflammation but negative for any suspicious cells to indicate urothelial carcinoma. Lastly, he underwent a cystoscopy with Dr. Galaviz on 4/19/21, which was negative. No evidence of balanitis on exam at that time. Was advised to stop the hydrocortisone that he had been applying to the area. Noted that we could consider circumcision with possible biopsy if this continues. Could also consider treatment for prostatitis, given the intraprostatic calcifications on his CT.     He continues to have low-level symptoms currently. Symptoms were severe on 5/5, at which time he had attempted to have UA/UC ordered, but this was unfortunately not placed until the following day.     Has been taking Flomax, but it does not seem to be making much of a difference.          Past Medical History:     Past Medical History:   Diagnosis Date     Arthritis      Autoimmune disease (H)      Basal cell carcinoma      Chronic tonsillitis      Hoarseness      Skin cancer             Past Surgical History:     Past Surgical History:   Procedure Laterality Date     ENT SURGERY  1993    fix deviated septum, scrape turbinates     MOHS MICROGRAPHIC PROCEDURE               Medications     Current Outpatient Medications   Medication     sulfamethoxazole-trimethoprim (BACTRIM DS) 800-160 MG tablet     ARTIFICIAL TEAR OP     fluocinonide (LIDEX) 0.05 % solution     hydrocortisone 2.5 % ointment     ibuprofen (ADVIL,MOTRIN) 200 MG tablet     MELATONIN PO     zaleplon (SONATA) 5 MG capsule     zolpidem (AMBIEN) 5 MG tablet     No current facility-administered medications for this visit.      Facility-Administered Medications Ordered in Other Visits   Medication     May continue current IV fluid if patient has IV fluids infusing until discharge.     ondansetron (ZOFRAN-ODT) ODT tab 4 mg    Or     ondansetron (ZOFRAN) injection 4 mg     sodium chloride (PF) 0.9% PF flush 3 mL            Family History:     Family History   Problem Relation Age of Onset     Diabetes Father      Heart Disease Father      Hypertension Mother      Cancer No family hx of         No family history of skin cancer     Skin Cancer No family hx of      Glaucoma No family hx of      Macular Degeneration No family hx of             Social History:     Social History     Socioeconomic History     Marital status:      Spouse name: Not on file     Number of children: Not on file     Years of education: Not on file     Highest education level: Not on file   Occupational History     Not on file   Social Needs     Financial resource strain: Not on file     Food insecurity     Worry: Not on file     Inability: Not on file     Transportation needs     Medical: Not on file     Non-medical: Not on file   Tobacco Use     Smoking status: Never Smoker     Smokeless tobacco: Never Used   Substance and Sexual Activity     Alcohol use: Yes     Drug use: No     Sexual activity: Yes     Partners: Female     Birth control/protection: Condom   Lifestyle     Physical activity     Days per week: Not on file     Minutes per session: Not on file     Stress: Not on file   Relationships     Social connections     Talks on phone: Not on  "file     Gets together: Not on file     Attends [employer] service: Not on file     Active member of club or organization: Not on file     Attends meetings of clubs or organizations: Not on file     Relationship status: Not on file     Intimate partner violence     Fear of current or ex partner: Not on file     Emotionally abused: Not on file     Physically abused: Not on file     Forced sexual activity: Not on file   Other Topics Concern     Parent/sibling w/ CABG, MI or angioplasty before 65F 55M? Not Asked   Social History Narrative     Works at [employer] in [employer]            Allergies:   Ciprofloxacin         Review of Systems:  From intake questionnaire   Negative 14 system review except as noted on HPI, nurse's note.         Physical Exam:   Patient is a 65 year old  male   Vitals: Blood pressure 121/83, pulse 75, height 1.778 m (5' 10\"), weight 78 kg (172 lb).  General Appearance Adult: Alert, no acute distress, oriented  Lungs: no respiratory distress, or pursed lip breathing  Heart: No obvious jugular venous distension present  Abdomen: soft, nontender, no organomegaly or masses, Body mass index is 24.68 kg/m .     Uroflow and Post-Void Residual by Bladder Scan     Residual Volume by Ultrasound: 14 mL      Labs and Pathology:    I personally reviewed all applicable laboratory data and went over findings with patient  Significant for:    CBC RESULTS:  Recent Labs   Lab Test 04/17/20  1239 05/30/19  0910 05/20/16  2123 05/09/16  0953   WBC 6.7 4.7 5.4 4.8   HGB 15.5 14.9 14.3 14.2    217 211 275        BMP RESULTS:  Recent Labs   Lab Test 03/09/21  1408 02/04/21  1635 04/17/20  1239 05/30/19  0910 05/20/16  2123   NA  --  141 139 140 139   POTASSIUM  --  4.2 4.0 4.5 3.8   CHLORIDE  --  106 105 106 104   CO2  --  30 31 29 29   ANIONGAP  --  4 3 4 6   GLC  --  100* 103* 98 82   BUN  --  18 19 14 16   CR  --  0.74 0.78 0.75 0.84   GFRESTIMATED 68 >90 >90 >90 >90  Non  GFR Calc   "   GFRESTBLACK 82 >90 >90 >90 >90  African American GFR Calc     NANCY  --  8.9 9.3 9.1 8.8       UA RESULTS:   Recent Labs   Lab Test 03/31/21  0943 03/01/21  1520 10/23/20  0758   SG 1.019 1.014 1.018   URINEPH 7.0 8.0* 7.0   NITRITE Negative Negative Negative   RBCU >182* 2 2   WBCU >182* 8* 1       PSA RESULTS  PSA   Date Value Ref Range Status   05/30/2019 0.63 0 - 4 ug/L Final     Comment:     Assay Method:  Chemiluminescence using Siemens Vista analyzer   05/09/2016 0.70 0 - 4 ug/L Final   04/09/2014 0.50 0 - 4 ug/L Final     Comment:     PSA results are about 7% lower than our prior method due to a methodology   change   on August 30, 2011.         Imaging:    I personally reviewed all applicable imaging and went over findings with patient.  Significant for:    Results for orders placed or performed in visit on 03/09/21   CT Urogram    Narrative    EXAM: CT ABDOMEN PELVIS W/O and W CONTRAST  LOCATION: [facility]  DATE/TIME: 3/9/2021 1:54 PM    INDICATION: Recurrent UTI   COMPARISON: Abdominal ultrasound 11/09/2020 and CT of the abdomen and pelvis 05/20/2016  TECHNIQUE: CT scan of the abdomen and pelvis was performed before and after injection of IV contrast. Multiplanar reformats were obtained. Dose reduction techniques were used.  CONTRAST: Isovue 370 105ml     FINDINGS:   LOWER CHEST: Normal.    HEPATOBILIARY: Normal.    PANCREAS: Normal.    SPLEEN: Normal.    ADRENAL GLANDS: Normal.    KIDNEYS/BLADDER: No renal, ureteral or urinary bladder calculi. No suspicious renal masses. Symmetric nephrograms. No hydronephrosis or perinephric stranding. Mild nonspecific stranding around the inferior aspects of the kidneys bilaterally is stable. No   abnormal urothelial enhancement or filling defects in the renal collecting system or ureters. Partially distended urinary bladder without focal wall thickening, masses or diverticuli.    BOWEL: No obstruction or inflammatory change.    LYMPH NODES: No  lymphadenopathy.    VASCULATURE: No abdominal aortic aneurysm.    PELVIC ORGANS: Mild intraprostatic calcifications. No significant prostatomegaly. No pelvic masses.    MUSCULOSKELETAL: Normal.      Impression    IMPRESSION:   1.  No urinary system calculi. No suspicious renal masses or urothelial lesions in the upper tracts. Partially distended urinary bladder without focal masses, wall thickening or diverticuli.              Assessment and Plan:     Assessment: 65 year old male with mixed LUTS and recurrent E.coli UTIs, now with ongoing symptoms. His last urine culture was negative despite UA suggesting evidence of infection. Given these results and absence of other findings on imagine or cystoscopy, I suspect he may be dealing with chronic prostatitis. Discussed this today, and he too mentions this as a differential diagnosis that he has considered. Throughout the past several months, he has taken short treatment courses (7-10 days) of antibiotics, but has not taken a prolonged course to cover prostatitis.     Plan:  -OK to stop Flomax.  -Will send UC today to evaluate for presence of bacteria and sensitivities. I will be in touch with him regarding results.  -If UC positive and sensitive to Bactrim, or if UC negative, patient will plan to fill and begin taking a 6-week course of Bactrim DS twice daily for treatment of prostatitis.       Renetta Pavon, CNP  Department of Urology

## 2021-05-18 ENCOUNTER — OFFICE VISIT (OUTPATIENT)
Dept: UROLOGY | Facility: CLINIC | Age: 66
End: 2021-05-18
Payer: COMMERCIAL

## 2021-05-18 VITALS
BODY MASS INDEX: 24.62 KG/M2 | DIASTOLIC BLOOD PRESSURE: 83 MMHG | SYSTOLIC BLOOD PRESSURE: 121 MMHG | HEIGHT: 70 IN | WEIGHT: 172 LBS | HEART RATE: 75 BPM

## 2021-05-18 DIAGNOSIS — N41.1 CHRONIC PROSTATITIS: ICD-10-CM

## 2021-05-18 DIAGNOSIS — N39.0 RECURRENT UTI: Primary | ICD-10-CM

## 2021-05-18 PROCEDURE — 87186 SC STD MICRODIL/AGAR DIL: CPT | Mod: 90 | Performed by: PATHOLOGY

## 2021-05-18 PROCEDURE — 99213 OFFICE O/P EST LOW 20 MIN: CPT | Performed by: NURSE PRACTITIONER

## 2021-05-18 PROCEDURE — 87086 URINE CULTURE/COLONY COUNT: CPT | Mod: 90 | Performed by: PATHOLOGY

## 2021-05-18 PROCEDURE — 87088 URINE BACTERIA CULTURE: CPT | Mod: 90 | Performed by: PATHOLOGY

## 2021-05-18 PROCEDURE — 51798 US URINE CAPACITY MEASURE: CPT | Performed by: NURSE PRACTITIONER

## 2021-05-18 PROCEDURE — 99000 SPECIMEN HANDLING OFFICE-LAB: CPT | Performed by: PATHOLOGY

## 2021-05-18 RX ORDER — SULFAMETHOXAZOLE/TRIMETHOPRIM 800-160 MG
1 TABLET ORAL 2 TIMES DAILY
Qty: 84 TABLET | Refills: 0 | Status: SHIPPED | OUTPATIENT
Start: 2021-05-18 | End: 2021-06-29

## 2021-05-18 ASSESSMENT — MIFFLIN-ST. JEOR: SCORE: 1581.44

## 2021-05-18 ASSESSMENT — PAIN SCALES - GENERAL: PAINLEVEL: NO PAIN (0)

## 2021-05-18 NOTE — PATIENT INSTRUCTIONS
UROLOGY CLINIC VISIT PATIENT INSTRUCTIONS    -Will send your urine for culture today.  -I will also send a prescription for Bactrim DS to be taken twice daily for 6 weeks to your pharmacy.   -I will be in touch with you via [facility] regarding results of your urine culture and sensitivity to Bactrim. If your culture comes back negative, will have you fill and start this.     If you have any issues, questions or concerns in the meantime, do not hesitate to contact us at [phone] or via [employer].     It was a pleasure meeting with you today.  Thank you for allowing me and my team the privilege of caring for you today.  YOU are the reason we are here, and I truly hope we provided you with the excellent service you deserve.  Please let us know if there is anything else we can do for you so that we can be sure you are leaving completely satisfied with your care experience.    Renetta Pavon, CNP

## 2021-05-18 NOTE — NURSING NOTE
"Chief Complaint   Patient presents with     Follow Up     BPH       Blood pressure 121/83, pulse 75, height 1.778 m (5' 10\"), weight 78 kg (172 lb). Body mass index is 24.68 kg/m .    Patient Active Problem List   Diagnosis     Psoriasis     Neoplasm of uncertain behavior of skin     AK (actinic keratosis)     BCC (basal cell carcinoma), trunk     Actinic keratosis     History of skin cancer     Diverticulitis     History of basal cell cancer     Dermal nevus     Dermatitis, seborrheic       Allergies   Allergen Reactions     Ciprofloxacin Rash       Current Outpatient Medications   Medication Sig Dispense Refill     ARTIFICIAL TEAR OP Apply 1 drop to eye as needed       fluocinonide (LIDEX) 0.05 % solution Apply topically 2 times daily 60 mL 1     hydrocortisone 2.5 % ointment Apply topically 2 times daily To inflammation on penis 30 g 11     ibuprofen (ADVIL,MOTRIN) 200 MG tablet Take 200 mg by mouth every 4 hours as needed       MELATONIN PO Take 5 mg by mouth At Bedtime        tamsulosin (FLOMAX) 0.4 MG capsule Take 1 capsule (0.4 mg) by mouth daily 90 capsule 3     zaleplon (SONATA) 5 MG capsule Take 1 capsule (5 mg) by mouth nightly as needed for sleep 15 capsule 0     zolpidem (AMBIEN) 5 MG tablet Take 1 tablet (5 mg) by mouth nightly as needed for sleep 30 tablet 1       Social History     Tobacco Use     Smoking status: Never Smoker     Smokeless tobacco: Never Used   Substance Use Topics     Alcohol use: Yes     Drug use: No       Neris Campbell  5/18/2021  2:37 PM  "

## 2021-05-18 NOTE — LETTER
5/18/2021       RE: Eric Espinosa  [address]   [facility] [address]     Dear Colleague,    Thank you for referring your patient, Eric Espinosa, to the [facility] UROLOGY CLINIC [place] at [facility]. Please see a copy of my visit note below.            Chief Complaint:   LUTS, recurrent UTI         History of Present Illness:    Eric Espinosa is a 65 year old male who presents for follow up regarding mixed LUTS (urinary frequency, nocturia, hesitancy, urgency, UUI, intermittency, weak stream, and sensation of incomplete emptying), as well as recurrent E.coli UTIs despite appropriate antibiotic treatment courses. Has also had longstanding balanitis, for which he had used for antifungal creams with no improvement, as well as hydrocortisone cream with some improvement.     Due to his recurrent infections, a CT urogram was obtained on 3/9/21 and was largely normal with no urologic abnormality. Urine cytology was obtained a few weeks later due to reported passage of tissue pieces, and was positive for crystals and inflammation but negative for any suspicious cells to indicate urothelial carcinoma. Lastly, he underwent a cystoscopy with Dr. Galaviz on 4/19/21, which was negative. No evidence of balanitis on exam at that time. Was advised to stop the hydrocortisone that he had been applying to the area. Noted that we could consider circumcision with possible biopsy if this continues. Could also consider treatment for prostatitis, given the intraprostatic calcifications on his CT.     He continues to have low-level symptoms currently. Symptoms were severe on 5/5, at which time he had attempted to have UA/UC ordered, but this was unfortunately not placed until the following day.     Has been taking Flomax, but it does not seem to be making much of a difference.          Past Medical History:     Past Medical History:   Diagnosis Date     Arthritis       Autoimmune disease (H)      Basal cell carcinoma      Chronic tonsillitis      Hoarseness      Skin cancer             Past Surgical History:     Past Surgical History:   Procedure Laterality Date     ENT SURGERY  1993    fix deviated septum, scrape turbinates     MOHS MICROGRAPHIC PROCEDURE              Medications     Current Outpatient Medications   Medication     sulfamethoxazole-trimethoprim (BACTRIM DS) 800-160 MG tablet     ARTIFICIAL TEAR OP     fluocinonide (LIDEX) 0.05 % solution     hydrocortisone 2.5 % ointment     ibuprofen (ADVIL,MOTRIN) 200 MG tablet     MELATONIN PO     zaleplon (SONATA) 5 MG capsule     zolpidem (AMBIEN) 5 MG tablet     No current facility-administered medications for this visit.      Facility-Administered Medications Ordered in Other Visits   Medication     May continue current IV fluid if patient has IV fluids infusing until discharge.     ondansetron (ZOFRAN-ODT) ODT tab 4 mg    Or     ondansetron (ZOFRAN) injection 4 mg     sodium chloride (PF) 0.9% PF flush 3 mL            Family History:     Family History   Problem Relation Age of Onset     Diabetes Father      Heart Disease Father      Hypertension Mother      Cancer No family hx of         No family history of skin cancer     Skin Cancer No family hx of      Glaucoma No family hx of      Macular Degeneration No family hx of             Social History:     Social History     Socioeconomic History     Marital status:      Spouse name: Not on file     Number of children: Not on file     Years of education: Not on file     Highest education level: Not on file   Occupational History     Not on file   Social Needs     Financial resource strain: Not on file     Food insecurity     Worry: Not on file     Inability: Not on file     Transportation needs     Medical: Not on file     Non-medical: Not on file   Tobacco Use     Smoking status: Never Smoker     Smokeless tobacco: Never Used   Substance and Sexual Activity      "Alcohol use: Yes     Drug use: No     Sexual activity: Yes     Partners: Female     Birth control/protection: Condom   Lifestyle     Physical activity     Days per week: Not on file     Minutes per session: Not on file     Stress: Not on file   Relationships     Social connections     Talks on phone: Not on file     Gets together: Not on file     Attends Anabaptism service: Not on file     Active member of club or organization: Not on file     Attends meetings of clubs or organizations: Not on file     Relationship status: Not on file     Intimate partner violence     Fear of current or ex partner: Not on file     Emotionally abused: Not on file     Physically abused: Not on file     Forced sexual activity: Not on file   Other Topics Concern     Parent/sibling w/ CABG, MI or angioplasty before 65F 55M? 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         Physical Exam:   Patient is a 65 year old  male   Vitals: Blood pressure 121/83, pulse 75, height 1.778 m (5' 10\"), weight 78 kg (172 lb).  General Appearance Adult: Alert, no acute distress, oriented  Lungs: no respiratory distress, or pursed lip breathing  Heart: No obvious jugular venous distension present  Abdomen: soft, nontender, no organomegaly or masses, Body mass index is 24.68 kg/m .     Uroflow and Post-Void Residual by Bladder Scan     Residual Volume by Ultrasound: 14 mL      Labs and Pathology:    I personally reviewed all applicable laboratory data and went over findings with patient  Significant for:    CBC RESULTS:  Recent Labs   Lab Test 04/17/20  1239 05/30/19  0910 05/20/16  2123 05/09/16  0953   WBC 6.7 4.7 5.4 4.8   HGB 15.5 14.9 14.3 14.2    217 211 275        BMP RESULTS:  Recent Labs   Lab Test 03/09/21  1408 02/04/21  1635 04/17/20  1239 " 05/30/19  0910 05/20/16  2123   NA  --  141 139 140 139   POTASSIUM  --  4.2 4.0 4.5 3.8   CHLORIDE  --  106 105 106 104   CO2  --  30 31 29 29   ANIONGAP  --  4 3 4 6   GLC  --  100* 103* 98 82   BUN  --  18 19 14 16   CR  --  0.74 0.78 0.75 0.84   GFRESTIMATED 68 >90 >90 >90 >90  Non African American GFR Calc     GFRESTBLACK 82 >90 >90 >90 >90  African American GFR Calc     NANCY  --  8.9 9.3 9.1 8.8       UA RESULTS:   Recent Labs   Lab Test 03/31/21  0943 03/01/21  1520 10/23/20  0758   SG 1.019 1.014 1.018   URINEPH 7.0 8.0* 7.0   NITRITE Negative Negative Negative   RBCU >182* 2 2   WBCU >182* 8* 1       PSA RESULTS  PSA   Date Value Ref Range Status   05/30/2019 0.63 0 - 4 ug/L Final     Comment:     Assay Method:  Chemiluminescence using Siemens Vista analyzer   05/09/2016 0.70 0 - 4 ug/L Final   04/09/2014 0.50 0 - 4 ug/L Final     Comment:     PSA results are about 7% lower than our prior method due to a methodology   change   on August 30, 2011.         Imaging:    I personally reviewed all applicable imaging and went over findings with patient.  Significant for:    Results for orders placed or performed in visit on 03/09/21   CT Urogram    Narrative    EXAM: CT ABDOMEN PELVIS W/O and W CONTRAST  LOCATION: [facility]  DATE/TIME: 3/9/2021 1:54 PM    INDICATION: Recurrent UTI   COMPARISON: Abdominal ultrasound 11/09/2020 and CT of the abdomen and pelvis 05/20/2016  TECHNIQUE: CT scan of the abdomen and pelvis was performed before and after injection of IV contrast. Multiplanar reformats were obtained. Dose reduction techniques were used.  CONTRAST: Isovue 370 105ml     FINDINGS:   LOWER CHEST: Normal.    HEPATOBILIARY: Normal.    PANCREAS: Normal.    SPLEEN: Normal.    ADRENAL GLANDS: Normal.    KIDNEYS/BLADDER: No renal, ureteral or urinary bladder calculi. No suspicious renal masses. Symmetric nephrograms. No hydronephrosis or perinephric stranding. Mild nonspecific stranding around the  inferior aspects of the kidneys bilaterally is stable. No   abnormal urothelial enhancement or filling defects in the renal collecting system or ureters. Partially distended urinary bladder without focal wall thickening, masses or diverticuli.    BOWEL: No obstruction or inflammatory change.    LYMPH NODES: No lymphadenopathy.    VASCULATURE: No abdominal aortic aneurysm.    PELVIC ORGANS: Mild intraprostatic calcifications. No significant prostatomegaly. No pelvic masses.    MUSCULOSKELETAL: Normal.      Impression    IMPRESSION:   1.  No urinary system calculi. No suspicious renal masses or urothelial lesions in the upper tracts. Partially distended urinary bladder without focal masses, wall thickening or diverticuli.              Assessment and Plan:     Assessment: 65 year old male with mixed LUTS and recurrent E.coli UTIs, now with ongoing symptoms. His last urine culture was negative despite UA suggesting evidence of infection. Given these results and absence of other findings on imagine or cystoscopy, I suspect he may be dealing with chronic prostatitis. Discussed this today, and he too mentions this as a differential diagnosis that he has considered. Throughout the past several months, he has taken short treatment courses (7-10 days) of antibiotics, but has not taken a prolonged course to cover prostatitis.     Plan:  -OK to stop Flomax.  -Will send UC today to evaluate for presence of bacteria and sensitivities. I will be in touch with him regarding results.  -If UC positive and sensitive to Bactrim, or if UC negative, patient will plan to fill and begin taking a 6-week course of Bactrim DS twice daily for treatment of prostatitis.       Renetta Pavon, CNP  Department of Urology

## 2021-05-20 LAB
BACTERIA SPEC CULT: ABNORMAL
Lab: ABNORMAL
SPECIMEN SOURCE: ABNORMAL

## 2021-05-25 ENCOUNTER — OFFICE VISIT (OUTPATIENT)
Dept: OTOLARYNGOLOGY | Facility: CLINIC | Age: 66
End: 2021-05-25
Payer: COMMERCIAL

## 2021-05-25 VITALS — HEIGHT: 70 IN | WEIGHT: 172 LBS | BODY MASS INDEX: 24.62 KG/M2

## 2021-05-25 DIAGNOSIS — N39.0 RECURRENT UTI: ICD-10-CM

## 2021-05-25 DIAGNOSIS — R04.0 EPISTAXIS: Primary | ICD-10-CM

## 2021-05-25 DIAGNOSIS — R04.0 EPISTAXIS: ICD-10-CM

## 2021-05-25 LAB
APTT PPP: 35 SEC (ref 22–37)
INR PPP: 1.11 (ref 0.86–1.14)

## 2021-05-25 PROCEDURE — 85730 THROMBOPLASTIN TIME PARTIAL: CPT | Performed by: PATHOLOGY

## 2021-05-25 PROCEDURE — 36416 COLLJ CAPILLARY BLOOD SPEC: CPT | Performed by: PATHOLOGY

## 2021-05-25 PROCEDURE — 85610 PROTHROMBIN TIME: CPT | Performed by: PATHOLOGY

## 2021-05-25 PROCEDURE — 99213 OFFICE O/P EST LOW 20 MIN: CPT | Performed by: OTOLARYNGOLOGY

## 2021-05-25 ASSESSMENT — PAIN SCALES - GENERAL: PAINLEVEL: NO PAIN (0)

## 2021-05-25 ASSESSMENT — MIFFLIN-ST. JEOR: SCORE: 1581.44

## 2021-05-25 NOTE — Clinical Note
5/25/2021       RE: Eric Espinosa  [address]   [facility] [address]     Dear Colleague,    Thank you for referring your patient, Eric Espinosa, to the [facility] EAR NOSE AND THROAT CLINIC [place] at [facility]. Please see a copy of my visit note below.    No notes on file    Again, thank you for allowing me to participate in the care of your patient.      Sincerely,    Raghavendra Sandoval MD

## 2021-05-25 NOTE — LETTER
5/25/2021       RE: Eric Espinosa  [address]   [facility] [address]     Dear Colleague,    Thank you for referring your patient, Eric Espinosa, to the [facility] EAR NOSE AND THROAT CLINIC [place] at [facility]. Please see a copy of my visit note below.    HISTORY OF PRESENT ILLNESS: Eric Espinosa is a 65 year old male with a history ofHISTORY OF PRESENT ILLNESS:  Eric Espinosa is a gentleman who is 65 y still bother by throat discmfort. No new complaints.   Last 2 Scores for Patient-Answered VHI Questionnaire  No flowsheet data found.    Last 2 Scores for Patient-Answered CSI Questionnaire  No flowsheet data found.      Last 2 Scores for Patient-Answered EAT Questionnaire  No flowsheet data found.        PAST MEDICAL HISTORY:   Past Medical History:   Diagnosis Date     Arthritis      Autoimmune disease (H)      Basal cell carcinoma      Chronic tonsillitis      Hoarseness      Skin cancer        PAST SURGICAL HISTORY:   Past Surgical History:   Procedure Laterality Date     ENT SURGERY  1993    fix deviated septum, scrape turbinates     MOHS MICROGRAPHIC PROCEDURE         FAMILY HISTORY:   Family History   Problem Relation Age of Onset     Diabetes Father      Heart Disease Father      Hypertension Mother      Cancer No family hx of         No family history of skin cancer     Skin Cancer No family hx of      Glaucoma No family hx of      Macular Degeneration No family hx of        SOCIAL HISTORY:   Social History     Tobacco Use     Smoking status: Never Smoker     Smokeless tobacco: Never Used   Substance Use Topics     Alcohol use: Yes       REVIEW OF SYSTEMS: Ten point review of systems was performed and is negative except for:   [facility] ENT ROS 5/11/2021   Constitutional Problems with sleep   Neurology -   Ears, Nose, Throat Sore throat   Cardiopulmonary -   Gastrointestinal/Genitourinary Pain with urination   Musculoskeletal Back pain    Allergy/Immunology -   Endocrine Frequent urination   Other -        ALLERGIES: Ciprofloxacin    MEDICATIONS:   Current Outpatient Medications   Medication Sig Dispense Refill     ARTIFICIAL TEAR OP Apply 1 drop to eye as needed       fluocinonide (LIDEX) 0.05 % solution Apply topically 2 times daily 60 mL 1     ibuprofen (ADVIL,MOTRIN) 200 MG tablet Take 200 mg by mouth every 4 hours as needed       MELATONIN PO Take 5 mg by mouth At Bedtime        sulfamethoxazole-trimethoprim (BACTRIM DS) 800-160 MG tablet Take 1 tablet by mouth 2 times daily 84 tablet 0     zaleplon (SONATA) 5 MG capsule Take 1 capsule (5 mg) by mouth nightly as needed for sleep 15 capsule 0     zolpidem (AMBIEN) 5 MG tablet Take 1 tablet (5 mg) by mouth nightly as needed for sleep 30 tablet 1     hydrocortisone 2.5 % ointment Apply topically 2 times daily To inflammation on penis 30 g 11         PHYSICAL EXAMINATION:  He  is awake, alert and in no apparent distress.    His tympanic membranes are clear and intact bilaterally. External auditory canals are clear.  Nasal exam shows a mild septal deviation without obstruction.  Examination of the oral cavity shows no suspicious lesions.  There is symmetric movement of the tongue and soft palate.    The oropharynx is clear.  His neck is supple without significant adenopathy.  Pulse is regular.  Upper airway is clear.  Cranial nerves II-XII are grossly intact.                  ASSESSMENT:  Patient with some generalized pharyngeal erythema.  There are no other significant masses or lesions are present on his tonsils or on indirect laryngoscopy or anything else of this nature.      PLAN:   Conservative treatmen.t adher to preatutionss at the visit and follow up 6 weeks.     Raghavendra Sandoval MD        Again, thank you for allowing me to participate in the care of your patient.      Sincerely,    Raghavendra Sandoval MD

## 2021-05-25 NOTE — PATIENT INSTRUCTIONS
1. You were seen in the ENT Clinic today by Dr. Sandoval.  If you have any questions or concerns after your appointment, please call   -  ENT Clinic: [phone]          Irene Davenport LPN  [facility] Otolaryngology  [phone]

## 2021-05-25 NOTE — TELEPHONE ENCOUNTER
FUTURE VISIT INFORMATION      FUTURE VISIT INFORMATION:    Date: 6/22/21    Time: 2 PM    Location: [facility]-ENT  REFERRAL INFORMATION:    Referring provider:  Dr. Raghavendra Sandoval    Referring providers clinic:  [facility] - ENT    Reason for visit/diagnosis: Sleep Eval    RECORDS REQUESTED FROM:       Clinic name Comments Records Status Imaging Status   [facility] 5/25/21, 11/3/20 - ENT OV with Dr. Sandoval  4/6/21 - ALLERGY OV with Dr. Zendejas  6/4/20 - ENT OV with Dr. Cote  4/16/20 - PCC OV with Dr. Garland [facility] - Imaging 10/22/20 - CT Sinus W/WO [facility] PACs

## 2021-06-19 NOTE — PROGRESS NOTES
HISTORY OF PRESENT ILLNESS: Eric Espinosa is a 65 year old male with a history ofHISTORY OF PRESENT ILLNESS:  Eric sEpinosa is a gentleman who is 65 y still bother by throat discmfort. No new complaints.   Last 2 Scores for Patient-Answered VHI Questionnaire  No flowsheet data found.    Last 2 Scores for Patient-Answered CSI Questionnaire  No flowsheet data found.      Last 2 Scores for Patient-Answered EAT Questionnaire  No flowsheet data found.        PAST MEDICAL HISTORY:   Past Medical History:   Diagnosis Date     Arthritis      Autoimmune disease (H)      Basal cell carcinoma      Chronic tonsillitis      Hoarseness      Skin cancer        PAST SURGICAL HISTORY:   Past Surgical History:   Procedure Laterality Date     ENT SURGERY  1993    fix deviated septum, scrape turbinates     MOHS MICROGRAPHIC PROCEDURE         FAMILY HISTORY:   Family History   Problem Relation Age of Onset     Diabetes Father      Heart Disease Father      Hypertension Mother      Cancer No family hx of         No family history of skin cancer     Skin Cancer No family hx of      Glaucoma No family hx of      Macular Degeneration No family hx of        SOCIAL HISTORY:   Social History     Tobacco Use     Smoking status: Never Smoker     Smokeless tobacco: Never Used   Substance Use Topics     Alcohol use: Yes       REVIEW OF SYSTEMS: Ten point review of systems was performed and is negative except for:   [facility] ENT ROS 5/11/2021   Constitutional Problems with sleep   Neurology -   Ears, Nose, Throat Sore throat   Cardiopulmonary -   Gastrointestinal/Genitourinary Pain with urination   Musculoskeletal Back pain   Allergy/Immunology -   Endocrine Frequent urination   Other -        ALLERGIES: Ciprofloxacin    MEDICATIONS:   Current Outpatient Medications   Medication Sig Dispense Refill     ARTIFICIAL TEAR OP Apply 1 drop to eye as needed       fluocinonide (LIDEX) 0.05 % solution Apply topically 2 times daily 60 mL 1     ibuprofen  (ADVIL,MOTRIN) 200 MG tablet Take 200 mg by mouth every 4 hours as needed       MELATONIN PO Take 5 mg by mouth At Bedtime        sulfamethoxazole-trimethoprim (BACTRIM DS) 800-160 MG tablet Take 1 tablet by mouth 2 times daily 84 tablet 0     zaleplon (SONATA) 5 MG capsule Take 1 capsule (5 mg) by mouth nightly as needed for sleep 15 capsule 0     zolpidem (AMBIEN) 5 MG tablet Take 1 tablet (5 mg) by mouth nightly as needed for sleep 30 tablet 1     hydrocortisone 2.5 % ointment Apply topically 2 times daily To inflammation on penis 30 g 11         PHYSICAL EXAMINATION:  He  is awake, alert and in no apparent distress.    His tympanic membranes are clear and intact bilaterally. External auditory canals are clear.  Nasal exam shows a mild septal deviation without obstruction.  Examination of the oral cavity shows no suspicious lesions.  There is symmetric movement of the tongue and soft palate.    The oropharynx is clear.  His neck is supple without significant adenopathy.  Pulse is regular.  Upper airway is clear.  Cranial nerves II-XII are grossly intact.                  ASSESSMENT:  Patient with some generalized pharyngeal erythema.  There are no other significant masses or lesions are present on his tonsils or on indirect laryngoscopy or anything else of this nature.      PLAN:   Conservative treatmen.t adher to preatutionss at the visit and follow up 6 weeks.     Raghavendra Sandoval MD

## 2021-06-22 ENCOUNTER — OFFICE VISIT (OUTPATIENT)
Dept: OTOLARYNGOLOGY | Facility: CLINIC | Age: 66
End: 2021-06-22
Payer: COMMERCIAL

## 2021-06-22 ENCOUNTER — PRE VISIT (OUTPATIENT)
Dept: OTOLARYNGOLOGY | Facility: CLINIC | Age: 66
End: 2021-06-22

## 2021-06-22 VITALS — OXYGEN SATURATION: 99 % | WEIGHT: 174 LBS | HEIGHT: 70 IN | HEART RATE: 76 BPM | BODY MASS INDEX: 24.91 KG/M2

## 2021-06-22 DIAGNOSIS — F51.01 PRIMARY INSOMNIA: ICD-10-CM

## 2021-06-22 DIAGNOSIS — R06.83 SNORING: Primary | ICD-10-CM

## 2021-06-22 PROCEDURE — 99207 PR NO CHARGE LOS: CPT | Performed by: OTOLARYNGOLOGY

## 2021-06-22 PROCEDURE — 99213 OFFICE O/P EST LOW 20 MIN: CPT | Performed by: OTOLARYNGOLOGY

## 2021-06-22 ASSESSMENT — MIFFLIN-ST. JEOR: SCORE: 1590.51

## 2021-06-22 NOTE — LETTER
6/22/2021       RE: Eric Espinosa  [address]   [facility] [address]     Dear Colleague,    Thank you for referring your patient, Eric Espinosa, to the [facility] EAR NOSE AND THROAT CLINIC [place] at [facility]. Please see a copy of my visit note below.    CC: sore throat, snoring, insomnia    HPI: Patient reports that he has had a year-long history of low-grade sore throat.  He has had an extensive work-up for this including treatment for thrush, pH probe testing, multiple exams.  As of this far no specific etiology for the sore throat has been elucidated.  Patient has been told that he snores by family members.  He is wondering if snoring could be the cause of his sore throat.  He also deals with insomnia for which he has a regimen that works for the most part.  He also wonders if the insomnia and sore throat might be related.  Sore throat is typically present all day.    PE;  GEN: nad  O/C: MMM2, uvula is slightly elongated but appears normal.  I do not see any edema of the uvula.  Oropharyngeal anatomy appears normal.    A/P:  Patient has a sore throat of unknown etiology.  He has had an extensive work-up of which has been negative thus far.  At this time I do not what could be the cause of his sore throat.  It is possible that the sore throat could be caused by snoring.  Snoring can cause some vibratory trauma to the soft tissue of the oropharynx.  Typically the sore throat is worse in the morning and gets better through the day.  It can sometimes last for several days but typically will resolve after a few days.  Therefore difficult to know if the sore throat is being caused by snoring.  Patient was wondering if he could be evaluated through a home sleep study for possible obstructive sleep apnea.  A referral to sleep medicine was made for him.  I discussed with patient that it is unlikely that his insomnia and sore throat are related.  I think  these are separate issues.  At this time ENT follow-up is as needed.    I spent a total of 20 minutes total time towards today's visit.  Time spent included seeing and evaluating Eric Espinosa during today's office visit, which included counseling the patient.  Time was also spent reviewing records/tests; ordering sleep medicine referral;  and documenting clinical information in the electronic health record.        Again, thank you for allowing me to participate in the care of your patient.      Sincerely,    Yudy Isbell MD

## 2021-06-22 NOTE — NURSING NOTE
"Chief Complaint   Patient presents with     Consult     Throat pain, snoring       Pulse 76, height 1.778 m (5' 10\"), weight 78.9 kg (174 lb), SpO2 99 %.    Sravani Lee, EMT  "

## 2021-06-22 NOTE — PROGRESS NOTES
CC: sore throat, snoring, insomnia    HPI: Patient reports that he has had a year-long history of low-grade sore throat.  He has had an extensive work-up for this including treatment for thrush, pH probe testing, multiple exams.  As of this far no specific etiology for the sore throat has been elucidated.  Patient has been told that he snores by family members.  He is wondering if snoring could be the cause of his sore throat.  He also deals with insomnia for which he has a regimen that works for the most part.  He also wonders if the insomnia and sore throat might be related.  Sore throat is typically present all day.    PE;  GEN: nad  O/C: MMM2, uvula is slightly elongated but appears normal.  I do not see any edema of the uvula.  Oropharyngeal anatomy appears normal.    A/P:  Patient has a sore throat of unknown etiology.  He has had an extensive work-up of which has been negative thus far.  At this time I do not what could be the cause of his sore throat.  It is possible that the sore throat could be caused by snoring.  Snoring can cause some vibratory trauma to the soft tissue of the oropharynx.  Typically the sore throat is worse in the morning and gets better through the day.  It can sometimes last for several days but typically will resolve after a few days.  Therefore difficult to know if the sore throat is being caused by snoring.  Patient was wondering if he could be evaluated through a home sleep study for possible obstructive sleep apnea.  A referral to sleep medicine was made for him.  I discussed with patient that it is unlikely that his insomnia and sore throat are related.  I think these are separate issues.  At this time ENT follow-up is as needed.    I spent a total of 20 minutes total time towards today's visit.  Time spent included seeing and evaluating Eric Espinosa during today's office visit, which included counseling the patient.  Time was also spent reviewing records/tests; ordering sleep  medicine referral;  and documenting clinical information in the electronic health record.

## 2021-06-22 NOTE — PATIENT INSTRUCTIONS
1. You were seen in the clinic today by Dr. Isbell.    2.   A referral has been placed for you for sleep medicine. Follow up if symptoms worsen or fail to improve, please return to the ENT clinic.    If you have any questions or concerns after your appointment, please call the clinic.    -Clinic phone [phone]. Option #1 for scheduling related needs. Option #3 for nurse advice.   -Direct phone [phone]    Zandra Davis LPN  [facility]  Department of Otolaryngology

## 2021-06-22 NOTE — LETTER
6/22/2021       RE: Eric Espinosa  [address] [place] [address]     Dear Colleague,    Thank you for referring your patient, Eric Espinosa, to the [facility] [place] at [facility]. Please see a copy of my visit note below.     This patient was seen today with Dr Isbell and she managed the note and visit.     Raghavendra Sandoval MD        Again, thank you for allowing me to participate in the care of your patient.      Sincerely,    Raghavendra Sandoval MD

## 2021-06-23 NOTE — PROGRESS NOTES
HPI:    He was seen in ENT yesterday 6/22/2021, ENT, Dr. Isbell.for throat complaints. He was seen by Dr. Sandoval, ENT as well 5/25/20201. Last visit with me 4/17/2020. He states he has been on about one month of bactrim for presumed treatment of prostatitis. He states minimal sxs. For about 2 weeks and this seems to be effective for his chronic urinary complaints. No F/C/NS. He states R thumb pain for about 3 weeks. He is R handed. He is wearing a thumb soft splint. He states chronic sore throat but no worse. Otherwise, no additional HEENT, cardiopulmonary, abdominal, , neurological, systemic, psychiatric, lymphatic, endocrine complaints.     Past Medical History:   Diagnosis Date     Arthritis      Autoimmune disease (H)      Basal cell carcinoma      Chronic tonsillitis      Hoarseness      Skin cancer      Past Surgical History:   Procedure Laterality Date     ENT SURGERY  1993    fix deviated septum, scrape turbinates     MOHS MICROGRAPHIC PROCEDURE       PE:    Vitals noted, gen, nad, cooperative, alert, neck supple nl rom, lungs with good air movement, RRR, S1, S2, no MRG, abdomen, no acute findings. Grossly normal neurological exam. R thumb with tenderness to palpation MCP joint.     A/P:    1. Sleep medicine follow up 7/29/2021  2. Immunizations; he has completed the Moderna COVID vaccination series  3. Sore throat. Seen 6/22/2021 and 5/25/2021 ENT  4. Seen in Urology 5/18/2021; LUTS, recurrent UTI's. On 6 weeks of Bactrim; PSA 0.63 on 5/30/2019.  5. Seen by Dr. Zendejas, Allergy/dermatology 4/6/2021 for allergy evaluation  6. Seen GI, Dr. Finch, 11/20/2020 for elevated liver tests.  He had an abdominal U/S 11/9/2020 that was normal.   7. Dermatology visit with Dr. Fournier 11/20/2020  8. PSA checked 5/30/2019  9. Colonoscopy 8/30/2016 and repeat in 5 years.   10. Lipids checked 1/22/2021;  and HDL 39. Not on medication.   11. Refilled Ambien. He has no side effects from this medication  12. R thumb  pain, osteoarthritis? Plain X-rays today and ortho referral if he wants    40 minutes spent on the date of the encounter doing chart review, history and exam, documentation and further activities as noted above

## 2021-06-24 ENCOUNTER — OFFICE VISIT (OUTPATIENT)
Dept: INTERNAL MEDICINE | Facility: CLINIC | Age: 66
End: 2021-06-24
Payer: COMMERCIAL

## 2021-06-24 ENCOUNTER — ANCILLARY PROCEDURE (OUTPATIENT)
Dept: GENERAL RADIOLOGY | Facility: CLINIC | Age: 66
End: 2021-06-24
Attending: INTERNAL MEDICINE
Payer: COMMERCIAL

## 2021-06-24 VITALS
HEART RATE: 70 BPM | BODY MASS INDEX: 24.25 KG/M2 | WEIGHT: 169 LBS | DIASTOLIC BLOOD PRESSURE: 81 MMHG | SYSTOLIC BLOOD PRESSURE: 121 MMHG | OXYGEN SATURATION: 95 %

## 2021-06-24 DIAGNOSIS — M79.601 PAIN OF RIGHT UPPER EXTREMITY: Primary | ICD-10-CM

## 2021-06-24 DIAGNOSIS — M79.601 PAIN OF RIGHT UPPER EXTREMITY: ICD-10-CM

## 2021-06-24 DIAGNOSIS — G47.9 SLEEP DISORDER: ICD-10-CM

## 2021-06-24 PROCEDURE — 73130 X-RAY EXAM OF HAND: CPT | Mod: RT | Performed by: RADIOLOGY

## 2021-06-24 PROCEDURE — 99215 OFFICE O/P EST HI 40 MIN: CPT | Performed by: INTERNAL MEDICINE

## 2021-06-24 RX ORDER — ZOLPIDEM TARTRATE 5 MG/1
5 TABLET ORAL
Qty: 30 TABLET | Refills: 1 | Status: SHIPPED | OUTPATIENT
Start: 2021-06-24 | End: 2023-01-11

## 2021-06-24 NOTE — NURSING NOTE
Chief Complaint   Patient presents with     RECHECK     follow up       Kizzy Perkins MA,  at 4:13 PM on 6/24/2021.

## 2021-06-25 NOTE — TELEPHONE ENCOUNTER
DIAGNOSIS: R thumb pain/ Dr Garland/ XR   APPOINTMENT DATE: 7.5.21   NOTES STATUS DETAILS   OFFICE NOTE from referring provider Internal 6.24.21 Dr Lanre Garland, [facility]   OFFICE NOTE from other specialist N/A    DISCHARGE SUMMARY from hospital N/A    DISCHARGE REPORT from the ER N/A    OPERATIVE REPORT N/A    EMG report N/A    MEDICATION LIST Internal    MRI N/A    DEXA (osteoporosis/bone health) N/A    CT SCAN N/A    XRAYS (IMAGES & REPORTS) Internal 6.24.21 R hand

## 2021-07-05 ENCOUNTER — PRE VISIT (OUTPATIENT)
Dept: ORTHOPEDICS | Facility: CLINIC | Age: 66
End: 2021-07-05

## 2021-07-21 NOTE — PROGRESS NOTES
This patient was seen today with Dr Isbell and she managed the note and visit.     Raghavendra Sandoval MD

## 2021-07-28 ASSESSMENT — ENCOUNTER SYMPTOMS
SORE THROAT: 1
INSOMNIA: 1

## 2021-07-28 ASSESSMENT — SLEEP AND FATIGUE QUESTIONNAIRES
HOW LIKELY ARE YOU TO NOD OFF OR FALL ASLEEP WHILE SITTING INACTIVE IN A PUBLIC PLACE: WOULD NEVER DOZE
HOW LIKELY ARE YOU TO NOD OFF OR FALL ASLEEP IN A CAR, WHILE STOPPED FOR A FEW MINUTES IN TRAFFIC: WOULD NEVER DOZE
HOW LIKELY ARE YOU TO NOD OFF OR FALL ASLEEP WHEN YOU ARE A PASSENGER IN A CAR FOR AN HOUR WITHOUT A BREAK: WOULD NEVER DOZE
HOW LIKELY ARE YOU TO NOD OFF OR FALL ASLEEP WHILE SITTING AND READING: WOULD NEVER DOZE
HOW LIKELY ARE YOU TO NOD OFF OR FALL ASLEEP WHILE SITTING QUIETLY AFTER LUNCH WITHOUT ALCOHOL: WOULD NEVER DOZE
HOW LIKELY ARE YOU TO NOD OFF OR FALL ASLEEP WHILE WATCHING TV: SLIGHT CHANCE OF DOZING
HOW LIKELY ARE YOU TO NOD OFF OR FALL ASLEEP WHILE SITTING AND TALKING TO SOMEONE: WOULD NEVER DOZE
HOW LIKELY ARE YOU TO NOD OFF OR FALL ASLEEP WHILE LYING DOWN TO REST IN THE AFTERNOON WHEN CIRCUMSTANCES PERMIT: WOULD NEVER DOZE

## 2021-07-28 NOTE — PROGRESS NOTES
"Eric Espinosa is a 65 year old male who is being evaluated via a billable video visit.       The patient has been notified of following:      \"This video visit will be conducted via a call between you and your physician/provider. We have found that certain health care needs can be provided without the need for an in-person physical exam.  This service lets us provide the care you need with a video conversation.  If a prescription is necessary we can send it directly to your pharmacy.  If lab work is needed we can place an order for that and you can then stop by our lab to have the test done at a later time.     Video visits are billed at different rates depending on your insurance coverage.  Please reach out to your insurance provider with any questions.     If during the course of the call the physician/provider feels a video visit is not appropriate, you will not be charged for this service.\"     Patient has given verbal consent for Video visit? Yes  How would you like to obtain your AVS? Mychart  If you are dropped from the video visit, the video invite should be resent to: Text to cell phone: [email or web address]  Will anyone else be joining your video visit? No  If patient encounters technical issues they should call [phone]      Video-Visit Details     Type of service:  Video Visit     Video Start Time: 1pm  Video End Time: 2pm    Originating Location (pt. Location): Home     Distant Location (provider location):  [facility]      Platform used for Video Visit: Zenobia TREJO CMA, SLEEP MEDICINE, 7/29/2021 9:51 AM    Virtual visit for chronic sleep maintenance insomnia, chronic sore throat, consider residual symptoms or breathing.     Assessment:  -Chronic sleep maintenance insomnia, with likely components of psychophysiological / conditioned insomnia with excessive melatonin use (total of 30+ milligrams per day)  -Borderline increased risk of sleep disordered breathing with " STOP-BANG score of 3    Plan:  -I feel it is reasonable to evaluate for least significant sleep disordered breathing and will plan for watch pat home sleep testing.  -For his chronic sleep maintenance insomnia, we did briefly discuss the consideration for psychophysiological insomnia and further review of behavioral modification, consideration for referral to sleep psychology for CBT-I.  To allow reduction in RUTHIE agonist, I did agree to a trial of Belsomra 10 mg at bedtime, along with recommending to not take his Sonata or zolpidem in the early morning hours.  -I do feel that his chronic insomnia would still benefit from some careful guidance on sleep hygiene, slight modifications of stimulus control, and likely the most important would be sleep restriction right feel he is likely spending excessive time in bed for his roughly 7 hours sleep need    SUBJECTIVE:  Eric Espinosa is a 65 year old year old male.    Pertinent PMHx of psoriasis, basal cell carcinoma, chronic sore throat.    Referred by Dr. Isbell for snoring, chronic insomnia.    Today -he is to primary questions today are regards to his chronic sleep maintenance insomnia and to evaluate for obstructive sleep apnea as part of evaluation for chronic sore throat.    For his chronic sleep maintenance insomnia, he feels that his been issues since the mid 1990s.  In general he denies any difficulty initiating sleep, and largely notes a singular prolonged middle the night awakening in the early morning hours.  He feels that this has possibly gotten somewhat worse over time, but the same moment he also feels that he has developed a method or system that seems to be stable and overall largely acceptable to him.  He has practiced some amount of stimulus control, sleep hygiene.    To help with his insomnia, he has been prescribed a few different medications in the past including:  Zolpidem 2.5-5 mg  Zaleplon 2.5-5 mg  He feels that these have been somewhat helpful,  but dislikes significant morning and persistent daytime grogginess from the use.  He also is open that he takes a few different supplements including tryptophan, glycine, 5-HTP.  He also notes taking both an extended release and normal melatonin.  All of these medications he typically will take at the time of his middle the night awakening, they're not taken at sleep onset.  For his melatonin, he will typically take 10 mg of the extended release and 10 mg of the immediate release and may potentially repeat this for a total of 30-40 mg per night.    Most nights he is in bed around 11:15 PM and will fall asleep quickly.  He'll seem to awaken spontaneously between 3-5 AM, will feel very awake, he'll typically get up and out of bed very quickly.  He does note that it has been recommended for him to give 15 minutes to return to sleep, but he feels that this is unlikely to happen so he will get up right away.  He'll typically then watch TV for approximately 30 minutes and will typically build to return to sleep within about 45-60 minutes.  He'll then awaken naturally or by alarm between 7-7:30 AM.    He denies any daytime fatigue or hypersomnia.    He does report loud snoring.  But he denies any observed apnea, choking, feeling of reflux-like symptoms during the night.    He denies any sleepwalking, sleep talking or known dream enacting behavior.    He does have a family history of a father with loud snoring.    He does share a bit about his evaluation for chronic sore throat that started with a viral illness around March 2020.  He has worked in that with multiple ENT and an allergist.  He did complete a esophageal pH monitor, allergy skin testing without specific findings.    He reports drinking less than 3 caffeinated beverages per day and none within 6-8 hours of bed.    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    STOP-BANG score of  3.  DUNIA Total Score: 9    Past medical history:    Patient Active Problem List    Diagnosis Date Noted     Dermal nevus 10/19/2018     Priority: Medium     Left lower mouth inferior to the left angle of mouth        Dermatitis, seborrheic 10/19/2018     Priority: Medium     History of basal cell cancer 08/30/2017     Priority: Medium     Diverticulitis 05/21/2016     Priority: Medium     Actinic keratosis 09/24/2013     Priority: Medium     History of skin cancer 09/24/2013     Priority: Medium     BCC (basal cell carcinoma), trunk 04/16/2013     Priority: Medium     AK (actinic keratosis) 02/19/2013     Priority: Medium     Psoriasis 02/12/2013     Priority: Medium     Neoplasm of uncertain behavior of skin 02/12/2013     Priority: Medium       10 point ROS of systems including Constitutional, Eyes, Respiratory, Cardiovascular, Gastroenterology, Genitourinary, Integumentary, Muscularskeletal, Psychiatric were all negative except for pertinent positives noted in my HPI.    Current Outpatient Medications   Medication Sig Dispense Refill     ARTIFICIAL TEAR OP Apply 1 drop to eye as needed       fluocinonide (LIDEX) 0.05 % solution Apply topically 2 times daily (Patient not taking: Reported on 6/24/2021) 60 mL 1     ibuprofen (ADVIL,MOTRIN) 200 MG tablet Take 200 mg by mouth every 4 hours as needed       MELATONIN PO Take 5 mg by mouth At Bedtime        zaleplon (SONATA) 5 MG capsule Take 1 capsule (5 mg) by mouth nightly as needed for sleep 15 capsule 0     zolpidem (AMBIEN) 5 MG tablet Take 1 tablet (5 mg) by mouth nightly as needed for sleep 30 tablet 1       OBJECTIVE:  There were no vitals taken for this visit.    Physical Exam     ---  This note was written with the assistance of the Dragon voice-dictation technology software. The final document, although reviewed, may contain errors. For corrections, please contact the office.    Stef Adler MD    Sleep Medicine  [facility] -  [facility]  ([phone])  [facility]  ([phone])    Answers for HPI/ROS submitted by the patient on 7/28/2021  General Symptoms: No  Skin Symptoms: No  HENT Symptoms: Yes  EYE SYMPTOMS: No  HEART SYMPTOMS: No  LUNG SYMPTOMS: No  INTESTINAL SYMPTOMS: No  URINARY SYMPTOMS: No  REPRODUCTIVE SYMPTOMS: No  SKELETAL SYMPTOMS: No  BLOOD SYMPTOMS: No  NERVOUS SYSTEM SYMPTOMS: No  MENTAL HEALTH SYMPTOMS: Yes  Sore throat: Yes  Tooth pain: Yes  Trouble sleeping: Yes

## 2021-07-29 ENCOUNTER — VIRTUAL VISIT (OUTPATIENT)
Dept: SLEEP MEDICINE | Facility: CLINIC | Age: 66
End: 2021-07-29
Attending: OTOLARYNGOLOGY
Payer: COMMERCIAL

## 2021-07-29 VITALS — HEIGHT: 70 IN | BODY MASS INDEX: 24.62 KG/M2 | WEIGHT: 172 LBS

## 2021-07-29 DIAGNOSIS — R53.82 CHRONIC FATIGUE: Primary | ICD-10-CM

## 2021-07-29 DIAGNOSIS — F51.01 PRIMARY INSOMNIA: ICD-10-CM

## 2021-07-29 DIAGNOSIS — R06.83 SNORING: ICD-10-CM

## 2021-07-29 PROCEDURE — 99205 OFFICE O/P NEW HI 60 MIN: CPT | Mod: 95 | Performed by: FAMILY MEDICINE

## 2021-07-29 ASSESSMENT — MIFFLIN-ST. JEOR: SCORE: 1581.44

## 2021-07-29 NOTE — PATIENT INSTRUCTIONS
Your BMI is Body mass index is 24.68 kg/m .  Weight management is a personal decision.  If you are interested in exploring weight loss strategies, the following discussion covers the approaches that may be successful. Body mass index (BMI) is one way to tell whether you are at a healthy weight, overweight, or obese. It measures your weight in relation to your height.  A BMI of 18.5 to 24.9 is in the healthy range. A person with a BMI of 25 to 29.9 is considered overweight, and someone with a BMI of 30 or greater is considered obese. More than two-thirds of American adults are considered overweight or obese.  Being overweight or obese increases the risk for further weight gain. Excess weight may lead to heart disease and diabetes.  Creating and following plans for healthy eating and physical activity may help you improve your health.  Weight control is part of healthy lifestyle and includes exercise, emotional health, and healthy eating habits. Careful eating habits lifelong are the mainstay of weight control. Though there are significant health benefits from weight loss, long-term weight loss with diet alone may be very difficult to achieve- studies show long-term success with dietary management in less than 10% of people. Attaining a healthy weight may be especially difficult to achieve in those with severe obesity. In some cases, medications, devices and surgical management might be considered.  What can you do?  If you are overweight or obese and are interested in methods for weight loss, you should discuss this with your provider.     Consider reducing daily calorie intake by 500 calories.     Keep a food journal.     Avoiding skipping meals, consider cutting portions instead.    Diet combined with exercise helps maintain muscle while optimizing fat loss. Strength training is particularly important for building and maintaining muscle mass. Exercise helps reduce stress, increase energy, and improves fitness.  Increasing exercise without diet control, however, may not burn enough calories to loose weight.       Start walking three days a week 10-20 minutes at a time    Work towards walking thirty minutes five days a week     Eventually, increase the speed of your walking for 1-2 minutes at time    In addition, we recommend that you review healthy lifestyles and methods for weight loss available through the National Institutes of Health patient information sites:  http://win.niddk.nih.gov/publications/index.htm    And look into health and wellness programs that may be available through your health insurance provider, employer, local community center, or odalys club.

## 2021-07-30 NOTE — PROGRESS NOTES
Watchpat order forwarded to Miriam chacon.     Maegan TREJO CMA, SLEEP MEDICINE, 7/30/2021 3:24 PM

## 2021-08-16 ENCOUNTER — MYC MEDICAL ADVICE (OUTPATIENT)
Dept: SLEEP MEDICINE | Facility: CLINIC | Age: 66
End: 2021-08-16

## 2021-08-18 ENCOUNTER — TELEPHONE (OUTPATIENT)
Dept: SLEEP MEDICINE | Facility: CLINIC | Age: 66
End: 2021-08-18

## 2021-08-18 DIAGNOSIS — F51.01 PRIMARY INSOMNIA: ICD-10-CM

## 2021-08-18 NOTE — TELEPHONE ENCOUNTER
PRIOR AUTHORIZATION DENIED    Medication: BELSOMRA 10 MG tablet- DENIED     Denial Date: 8/18/2021    Denial Rational: This medication is an exclusion benefit from the patient's pharmacy plan.      Appeal Information: If provider would like to appeal please provide a letter of medical necessity.

## 2021-08-18 NOTE — LETTER
2021    INSURER: Payor: MEDICA / Plan: MEDICA CHOICE / Product Type: Indemnity /   ATTN: ***  Re: Prior Authorization Request  Patient: Eric Espinosa  Policy ID#:  [id number]  : 1955      To Whom it May Concern:    I am writing to formally request a prior authorization of coverage for my patient,  Eric Espinosa, for treatment using suvorexant (Belsomra).  I am requesting authorization for applicable provider professional and facility services associated with this therapy.    The therapy involves prescription medication with suvorexant 10mg PO QHS.    The benefits of the therapy includes FDA approval for treatment of insomnia.      I have treated Eric Espinosa since 2021 and I have determined that it is medically appropriate for  this patient to receive be treated with  suvorexant 10mg PO QHS  for the reason(s) stated below:      Chronic insomnia with current usage of multiple RUTHIE receptor agonists (zolpidem, zaleplon) with concerns for increased fall risk as he is nearing age 67 and need to decrease RUTHIE acting medication.      Failed medications include Zolpidem 5mg (incompletely effective, morning grogginess), Zaleplon 5mg (incompletely effective)      Need to decrease RUTHIE acting medication and concern for use of zolpidem / zaleplon as age approaches 67 given increase fall risk.      Treatment would have quality of life benefit, reduction in fall risk.    I have included medical records pertaining to the patient s medical history, current condition and treatment plan.  In addition, the following billing codes will be used for therapy and follow-up: F51.01.      I firmly believe that this therapy is clinically appropriate and that Eric Espinosa would benefit from improved [clinical outcomes, quality of life] if allowed the opportunity to receive this treatment.  Please contact me at Dept: [phone] if you require additional information to ensure the prompt approval for  coverage.    Please send your written decision to me at this address:  [address] [address] [phone]  Dept: [phone]  E-mail: [email or web address]      Sincerely,      Stef Adler MD        Enclosures

## 2021-08-18 NOTE — TELEPHONE ENCOUNTER
Central Prior Authorization Team  Phone: [phone]    PA Initiation    Medication: BELSOMRA 10 MG tablet  Insurance Company: [employer] - Phone [phone] Fax [phone]  Pharmacy Filling the Rx: "[facility] #[id number] - [place], [place] - [address] AT [facility] [address]  Filling Pharmacy Phone: [phone]  Filling Pharmacy Fax:    Start Date: 8/18/2021

## 2021-08-19 PROBLEM — F51.01 PRIMARY INSOMNIA: Status: ACTIVE | Noted: 2021-08-19

## 2021-08-19 NOTE — TELEPHONE ENCOUNTER
Medication Appeal Initiation    We have initiated an appeal for the requested medication:  Medication: BELSOMRA 10 MG tablet- APPEAL INITIATED  Appeal Start Date:  8/19/2021  Insurance Company: [employer] - Phone [phone] Fax [phone]  Comments:  Faxed appeal to insurance. Fax# [phone]

## 2021-08-19 NOTE — TELEPHONE ENCOUNTER
MEDICATION APPEAL DENIED    Medication: BELSOMRA 10 MG tablet- APPEAL DENIED     Denial Date: 8/19/2021    Denial Rational: This medication is an exclusion benefit from the pt's pharmacy plan.      Second Level Appeal Information:     Second level appeals will be managed by the clinic staff and provider. Please contact the [employer] Prior Authorization Team if additional information about the denial is needed.

## 2021-08-25 ENCOUNTER — OFFICE VISIT (OUTPATIENT)
Dept: SLEEP MEDICINE | Facility: CLINIC | Age: 66
End: 2021-08-25
Attending: FAMILY MEDICINE
Payer: COMMERCIAL

## 2021-08-25 DIAGNOSIS — R06.83 SNORING: ICD-10-CM

## 2021-08-25 DIAGNOSIS — F51.01 PRIMARY INSOMNIA: ICD-10-CM

## 2021-08-25 DIAGNOSIS — R53.82 CHRONIC FATIGUE: ICD-10-CM

## 2021-08-25 PROCEDURE — 95800 SLP STDY UNATTENDED: CPT | Performed by: FAMILY MEDICINE

## 2021-08-25 NOTE — PROGRESS NOTES
Device has been registered and shipped via [employer] on 8/25/21. Patient was notified that package was mailed out. Watch [facility] serial number [id number].

## 2021-09-07 NOTE — PROGRESS NOTES
Watch pat has been scored using rule 1B, 4%.   Patient to follow up with provider to determine appropriate therapy.     Pat AHI: 7.7    Ordering Provider: MD Miriam Costello [facility], [facility]  Sleep Technologist

## 2021-09-09 NOTE — PROCEDURES
"WatchPAT - HOME SLEEP STUDY INTERPRETATION    Patient: Eric Espinosa  MRN: [id number]  YOB: 1955  Study Date: 9/5/2021  Referring Provider: Lanre Garland  Ordering Provider: Stef Adler MD, MD    Chain of custody patient verification was not enabled.       Indications for Home Study: Eric Espinosa is a 66 year old male with a history of snoring, chronic insomnia, psoriasis who presents with symptoms suggestive of obstructive sleep apnea.    Estimated body mass index is 24.68 kg/m  as calculated from the following:    Height as of 7/29/21: 1.778 m (5' 10\").    Weight as of 7/29/21: 78 kg (172 lb).  Total score - [place]: 1 (7/28/2021  8:45 AM)  STOP-BANG: 3/8    Data: A full night home sleep study was performed recording the standard physiologic parameters including peripheral arterial tonometry (PAT), sound/snoring, body position,  movement, sound, and oxygen saturation by pulse oximetry. Pulse rate was estimated by oximetry recording. Sleep staging (wake, REM, light, and deep sleep) was derived from PAT signal.  This study was considered adequate based on > 4 hours of quality oximetry and respiratory recording. As specified by the AASM Manual for the Scoring of Sleep and Associated events, version 2.3, Rule VIII.D 1B, 4% oxygen desaturation scoring for hypopneas is used as a standard of care on all home sleep apnea testing.    Total Recording Time: 8 hrs, 1 min  Total Sleep Time: 6 hrs, 50 min  % of Sleep Time REM: 18%    Respiratory:  Snoring: Snoring was present.  Respiratory events: The PAT respiratory disturbance index [pRDI] was 14.1 events per hour.  The PAT apnea/hypopnea index [pAHI] was 7.7 events per hour.  JACKIE was 6.4 events per hour.  During REM sleep the pAHI was 19.3.  Sleep Associated Hypoxemia: sustained hypoxemia was not present. Mean oxygen saturation was 93%.  Minimum was 88%.  Time with saturation less than 88% was 0.2 minutes.    Heart Rate: By pulse oximetry " normal rate was noted.     Position: Percent of time spent: supine - 88.1%, prone - 0%, on right - 11.9%, on left - 0%.  pAHI was 8.6 per hour supine, - per hour prone, 1.2 per hour on right side, and - per hour on left side.     Assessment:   Mild obstructive sleep apnea.  Strong positional component (supine AHI 8.6, lateral AHI 1.2)  Sleep associated hypoxemia was not present.    Recommendations:  Consider auto-CPAP at 5-15 cmH2O, oral appliance therapy, positional therapy or polysomnography with full night PAP titration.  Suggest optimizing sleep hygiene and avoiding sleep deprivation.  Weight management.    Diagnosis Code(s): Obstructive Sleep Apnea G47.33    Stef Adler MD, MD, September 9, 2021   Diplomate, American Board of Family Medicine, Sleep Medicine

## 2021-10-02 ENCOUNTER — HEALTH MAINTENANCE LETTER (OUTPATIENT)
Age: 66
End: 2021-10-02

## 2021-10-11 ENCOUNTER — MYC MEDICAL ADVICE (OUTPATIENT)
Dept: INTERNAL MEDICINE | Facility: CLINIC | Age: 66
End: 2021-10-11

## 2021-10-11 DIAGNOSIS — M79.609 PAIN IN EXTREMITY, UNSPECIFIED EXTREMITY: Primary | ICD-10-CM

## 2021-10-28 DIAGNOSIS — M79.642 LEFT HAND PAIN: Primary | ICD-10-CM

## 2021-10-28 NOTE — PROGRESS NOTES
"Eric Espinosa is a 66 year old male who is being evaluated via a billable video visit.       The patient has been notified of following:      \"This video visit will be conducted via a call between you and your physician/provider. We have found that certain health care needs can be provided without the need for an in-person physical exam.  This service lets us provide the care you need with a video conversation.  If a prescription is necessary we can send it directly to your pharmacy.  If lab work is needed we can place an order for that and you can then stop by our lab to have the test done at a later time.     Video visits are billed at different rates depending on your insurance coverage.  Please reach out to your insurance provider with any questions.     If during the course of the call the physician/provider feels a video visit is not appropriate, you will not be charged for this service.\"     Patient has given verbal consent for Video visit? Yes  How would you like to obtain your AVS? Mail a copy  If you are dropped from the video visit, the video invite should be resent to: Text to cell phone: -  Will anyone else be joining your video visit? No  If patient encounters technical issues they should call [phone]      Video-Visit Details     Type of service:  Video Visit     Video Start Time: 11am  Video End Time: 11:30am    Originating Location (pt. Location): Home     Distant Location (provider location):  [facility]      Platform used for Video Visit: HYLA Mobile    Virtual visit for mild obstructive sleep apnea and chronic sleep maintenance insomnia.     Assessment:  -Chronic sleep maintenance insomnia, with likely components of psychophysiological / conditioned insomnia, though is currently relatively stable and not highly disruptive averaging total sleep time of 7 hours.  - Mild JASON without sleep-associated hypoxemia.     Plan:  -We agreed to no specific treatment for the " very mild obstructive sleep apnea seen.  But, we did agree to proceed with a trial of a self fit mandibular vessel device primarily to focus on reducing snoring.  -I do not see a specific concern with continuing the low-dose of zaleplon used for early AM awakenings as needed, and we reviewed its pharmacokinetics and typical half-life.  I will place a refill for this today.  -He did find a program online for a low cost trial of Dayvigo 5 mg tablets with quantity #10.  I will write a prescription for this and he will submit it for the trial.  -If stable, plan for follow-up in 1 year, but recommended to follow-up sooner if his insomnia worsen so we can again hopefully address any behavioral changes.  Again discussed the consideration of cognitive behavioral therapy for insomnia, but he did not feel that was needed at this time.    SUBJECTIVE:  Eric Espinosa is a 66 year old year old male.    Pertinent PMHx of psoriasis, basal cell carcinoma, chronic sore throat.     Referred by Dr. Isbell for snoring, chronic insomnia.    7/29/2021 -he is to primary questions today are regards to his chronic sleep maintenance insomnia and to evaluate for obstructive sleep apnea as part of evaluation for chronic sore throat.     For his chronic sleep maintenance insomnia, he feels that his been issues since the mid 1990s.  In general he denies any difficulty initiating sleep, and largely notes a singular prolonged middle the night awakening in the early morning hours.  He feels that this has possibly gotten somewhat worse over time, but the same moment he also feels that he has developed a method or system that seems to be stable and overall largely acceptable to him.  He has practiced some amount of stimulus control, sleep hygiene.     To help with his insomnia, he has been prescribed a few different medications in the past including:  Zolpidem 2.5-5 mg  Zaleplon 2.5-5 mg  He feels that these have been somewhat helpful, but dislikes  significant morning and persistent daytime grogginess from the use.  He also is open that he takes a few different supplements including tryptophan, glycine, 5-HTP.  He also notes taking both an extended release and normal melatonin.  All of these medications he typically will take at the time of his middle the night awakening, they're not taken at sleep onset.  For his melatonin, he will typically take 10 mg of the extended release and 10 mg of the immediate release and may potentially repeat this for a total of 30-40 mg per night.     Most nights he is in bed around 11:15 PM and will fall asleep quickly.  He'll seem to awaken spontaneously between 3-5 AM, will feel very awake, he'll typically get up and out of bed very quickly.  He does note that it has been recommended for him to give 15 minutes to return to sleep, but he feels that this is unlikely to happen so he will get up right away.  He'll typically then watch TV for approximately 30 minutes and will typically build to return to sleep within about 45-60 minutes.  He'll then awaken naturally or by alarm between 7-7:30 AM.     He denies any daytime fatigue or hypersomnia.     He does report loud snoring.  But he denies any observed apnea, choking, feeling of reflux-like symptoms during the night.     He denies any sleepwalking, sleep talking or known dream enacting behavior.     He does have a family history of a father with loud snoring.     He does share a bit about his evaluation for chronic sore throat that started with a viral illness around March 2020.  He has worked in that with multiple ENT and an allergist.  He did complete a esophageal pH monitor, allergy skin testing without specific findings.     He reports drinking less than 3 caffeinated beverages per day and none within 6-8 hours of bed.     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     STOP-BANG score of  "3.  DUNIA Total Score: 9    A/P for trial of Belsomra 10mg PO at bedtime with goal to reduce RUTHIE agonist dosage, home sleep testing, discussion of behavioral modification and CBT-I.    Today -we reviewed his home sleep test in detail today.  Unfortunately, we are not able to get coverage for trial of suvorexant.  Overall, he feels that his insomnia is stable and doing relatively well.  He does not feel this is causing any major impact in his daytime functioning, and does not find it highly disruptive.  He estimates his average total sleep time of 7 hours.    [employer] - HOME SLEEP STUDY INTERPRETATION     Patient: Eric Espinosa  MRN: [id number]  YOB: 1955  Study Date: 9/5/2021  Referring Provider: Lanre Garland  Ordering Provider: Stef Adler MD, MD     Chain of custody patient verification was not enabled.       Indications for Home Study: Eric Espinosa is a 66 year old male with a history of snoring, chronic insomnia, psoriasis who presents with symptoms suggestive of obstructive sleep apnea.     Estimated body mass index is 24.68 kg/m  as calculated from the following:    Height as of 7/29/21: 1.778 m (5' 10\").    Weight as of 7/29/21: 78 kg (172 lb).  Total score - [place]: 1 (7/28/2021  8:45 AM)  STOP-BANG: 3/8     Data: A full night home sleep study was performed recording the standard physiologic parameters including peripheral arterial tonometry (PAT), sound/snoring, body position,  movement, sound, and oxygen saturation by pulse oximetry. Pulse rate was estimated by oximetry recording. Sleep staging (wake, REM, light, and deep sleep) was derived from PAT signal.  This study was considered adequate based on > 4 hours of quality oximetry and respiratory recording. As specified by the AASM Manual for the Scoring of Sleep and Associated events, version 2.3, Rule VIII.D 1B, 4% oxygen desaturation scoring for hypopneas is used as a standard of care on all home sleep apnea " testing.     Total Recording Time: 8 hrs, 1 min  Total Sleep Time: 6 hrs, 50 min  % of Sleep Time REM: 18%     Respiratory:  Snoring: Snoring was present.  Respiratory events: The PAT respiratory disturbance index [pRDI] was 14.1 events per hour.  The PAT apnea/hypopnea index [pAHI] was 7.7 events per hour.  JACKIE was 6.4 events per hour.  During REM sleep the pAHI was 19.3.  Sleep Associated Hypoxemia: sustained hypoxemia was not present. Mean oxygen saturation was 93%.  Minimum was 88%.  Time with saturation less than 88% was 0.2 minutes.     Heart Rate: By pulse oximetry normal rate was noted.      Position: Percent of time spent: supine - 88.1%, prone - 0%, on right - 11.9%, on left - 0%.  pAHI was 8.6 per hour supine, - per hour prone, 1.2 per hour on right side, and - per hour on left side.      Assessment:   Mild obstructive sleep apnea.  Strong positional component (supine AHI 8.6, lateral AHI 1.2)  Sleep associated hypoxemia was not present.     Recommendations:  Consider auto-CPAP at 5-15 cmH2O, oral appliance therapy, positional therapy or polysomnography with full night PAP titration.  Suggest optimizing sleep hygiene and avoiding sleep deprivation.  Weight management.     Diagnosis Code(s): Obstructive Sleep Apnea G47.33       Past medical history:    Patient Active Problem List    Diagnosis Date Noted     Primary insomnia 08/19/2021     Priority: Medium     Dermal nevus 10/19/2018     Priority: Medium     Left lower mouth inferior to the left angle of mouth        Dermatitis, seborrheic 10/19/2018     Priority: Medium     History of basal cell cancer 08/30/2017     Priority: Medium     Diverticulitis 05/21/2016     Priority: Medium     Actinic keratosis 09/24/2013     Priority: Medium     History of skin cancer 09/24/2013     Priority: Medium     BCC (basal cell carcinoma), trunk 04/16/2013     Priority: Medium     AK (actinic keratosis) 02/19/2013     Priority: Medium     Psoriasis 02/12/2013      Priority: Medium     Neoplasm of uncertain behavior of skin 02/12/2013     Priority: Medium       10 point ROS of systems including Constitutional, Eyes, Respiratory, Cardiovascular, Gastroenterology, Genitourinary, Integumentary, Muscularskeletal, Psychiatric were all negative except for pertinent positives noted in my HPI.    Current Outpatient Medications   Medication Sig Dispense Refill     ARTIFICIAL TEAR OP Apply 1 drop to eye as needed       fluocinonide (LIDEX) 0.05 % solution Apply topically 2 times daily 60 mL 1     ibuprofen (ADVIL,MOTRIN) 200 MG tablet Take 200 mg by mouth every 4 hours as needed       MELATONIN PO Take 5 mg by mouth At Bedtime        Suvorexant (BELSOMRA) 10 MG tablet Take 1 tablet (10 mg) by mouth nightly as needed for sleep 10 tablet 0     zaleplon (SONATA) 5 MG capsule Take 1 capsule (5 mg) by mouth nightly as needed for sleep 15 capsule 0     zolpidem (AMBIEN) 5 MG tablet Take 1 tablet (5 mg) by mouth nightly as needed for sleep 30 tablet 1       OBJECTIVE:  There were no vitals taken for this visit.    Physical Exam     ---  This note was written with the assistance of the Dragon voice-dictation technology software. The final document, although reviewed, may contain errors. For corrections, please contact the office.    Stef Adler MD    Sleep Medicine  [facility]  ([phone])  [facility]  ([phone])

## 2021-10-28 NOTE — TELEPHONE ENCOUNTER
DIAGNOSIS: Pt requesting XR first but no order/Hand Pain L thumb/ Dr Garland/ XR/ [facility]/ ortho con   APPOINTMENT DATE: 11/3/2021     NOTES STATUS DETAILS   OFFICE NOTE from referring provider Internal 6.24.21 Dr Lanre Garland, [facility]   OFFICE NOTE from other specialist N/A     DISCHARGE SUMMARY from hospital N/A     DISCHARGE REPORT from the ER N/A     OPERATIVE REPORT N/A     EMG report N/A     MEDICATION LIST Internal     MRI N/A     DEXA (osteoporosis/bone health) N/A     CT SCAN N/A     XRAYS (IMAGES & REPORTS) Internal 6.24.21 R hand

## 2021-10-29 ENCOUNTER — VIRTUAL VISIT (OUTPATIENT)
Dept: SLEEP MEDICINE | Facility: CLINIC | Age: 66
End: 2021-10-29
Payer: COMMERCIAL

## 2021-10-29 VITALS — HEIGHT: 70 IN | BODY MASS INDEX: 24.62 KG/M2 | WEIGHT: 172 LBS

## 2021-10-29 DIAGNOSIS — G47.9 SLEEP DISORDER: ICD-10-CM

## 2021-10-29 DIAGNOSIS — F51.01 PRIMARY INSOMNIA: Primary | ICD-10-CM

## 2021-10-29 PROCEDURE — 99214 OFFICE O/P EST MOD 30 MIN: CPT | Mod: 95 | Performed by: FAMILY MEDICINE

## 2021-10-29 ASSESSMENT — MIFFLIN-ST. JEOR: SCORE: 1576.44

## 2021-10-29 NOTE — PROGRESS NOTES
"Eric Espinosa is a 66 year old who is being evaluated via a billable video visit.      How would you like to obtain your AVS? MyChart  If the video visit is dropped, the invitation should be resent by: Send to e-mail at: [email or web address]  Will anyone else be joining your video visit? No    Are you currently in the state of [place] Yes  Does patient have any form of state insurance?Yes   Do you have wifi? Yes  Do you have a smart phone/device?Yes  Can you download an geneva on your phone comfortably with out assistance including You Tube? Yes    If patient encounters technical issues they should call [phone] :[id number]}    Rosario Solano CMA    Video-Visit Details    Video Start Time: {video visit start/end time for provider to select:859218}    Type of service:  Video Visit    Video End Time:{video visit start/end time for provider to select:152948}    Originating Location (pt. Location): {video visit patient location:306909::\"Home\"}    Distant Location (provider location):  @[email or web address]@     Platform used for Video Visit: {Virtual Visit Platforms:137469::\"Well\"}      "

## 2021-10-31 RX ORDER — LEMBOREXANT 5 MG/1
1 TABLET, FILM COATED ORAL AT BEDTIME
Qty: 10 TABLET | Refills: 0 | Status: SHIPPED | OUTPATIENT
Start: 2021-10-31 | End: 2021-12-06

## 2021-10-31 RX ORDER — ZALEPLON 5 MG/1
5 CAPSULE ORAL
Qty: 30 CAPSULE | Refills: 5 | Status: SHIPPED | OUTPATIENT
Start: 2021-10-31 | End: 2022-06-30

## 2021-11-03 ENCOUNTER — PRE VISIT (OUTPATIENT)
Dept: ORTHOPEDICS | Facility: CLINIC | Age: 66
End: 2021-11-03

## 2021-11-03 ENCOUNTER — OFFICE VISIT (OUTPATIENT)
Dept: ORTHOPEDICS | Facility: CLINIC | Age: 66
End: 2021-11-03
Payer: COMMERCIAL

## 2021-11-03 ENCOUNTER — ANCILLARY PROCEDURE (OUTPATIENT)
Dept: GENERAL RADIOLOGY | Facility: CLINIC | Age: 66
End: 2021-11-03
Payer: COMMERCIAL

## 2021-11-03 VITALS — HEIGHT: 70 IN | WEIGHT: 170 LBS | BODY MASS INDEX: 24.34 KG/M2

## 2021-11-03 DIAGNOSIS — M18.12 OSTEOARTHRITIS OF LEFT THUMB: ICD-10-CM

## 2021-11-03 DIAGNOSIS — M79.642 LEFT HAND PAIN: ICD-10-CM

## 2021-11-03 DIAGNOSIS — M18.11 OSTEOARTHRITIS OF RIGHT THUMB: Primary | ICD-10-CM

## 2021-11-03 PROCEDURE — 99213 OFFICE O/P EST LOW 20 MIN: CPT | Performed by: FAMILY MEDICINE

## 2021-11-03 PROCEDURE — 73130 X-RAY EXAM OF HAND: CPT | Mod: LT | Performed by: RADIOLOGY

## 2021-11-03 ASSESSMENT — MIFFLIN-ST. JEOR: SCORE: 1567.36

## 2021-11-03 NOTE — PROGRESS NOTES
"Sports Medicine Clinic Visit    PCP: Lanre Garland    Eric Espinosa is a 66 year old male who is seen  in consultation at the request of Dr. Garland presenting with left (MCP) thumb pain.  Is been bothersome for the last 2 to 3 months with gripping and grasping.  He points to the base of the thumb as the area of discomfort.  He had similar discomfort with his opposite thumb this past summer and was using brace for both thumbs.  Patient reports that left thumb feels unstable and coming out of the joint. Patient denies swelling, tingling and numbing sensations.     Injury: None    Location of Pain: left thumb  Duration of Pain: 2-3 month(s)  Rating of Pain: 1/10  Pain is better with: Brace (helps restrict activity and gives support).   Pain is worse with: gripping, grasping, pulling and general hand activity.   Additional Features: Right hand domiant  Treatment so far consists of: Brace  Prior History of related problems: None, patient has had similar issue with his right thumb.     Ht 1.778 m (5' 10\")   Wt 77.1 kg (170 lb)   BMI 24.39 kg/m          Wore soft splint for right thumb pain this past summer.  Xray at the time c/w triscaphe djd at base of rt thumb.  Has used IBU.       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        3 views right hand radiographs 6/25/2021 8:07 AM     History: Pain of right upper extremity     Comparison: None available.     Findings:     PA, oblique and lateral view(s) of the right hand were obtained.      No acute osseous abnormality.  No erosion.     Moderate degenerative changes of the triscaphe joints.  Additional  scattered degenerative change in the interphalangeal joints. Ossicle  at the first interphalangeal joint.     Soft tissue is unremarkable.                                                                      Impression:  1. No acute osseous abnormality.  2. 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     JUSTINO SHORE    PMH:  Past Medical History: "   Diagnosis Date     Arthritis      Autoimmune disease (H)      Basal cell carcinoma      Chronic tonsillitis      Hoarseness      Skin cancer        Active problem list:  Patient Active Problem List   Diagnosis     Psoriasis     Neoplasm of uncertain behavior of skin     AK (actinic keratosis)     BCC (basal cell carcinoma), trunk     Actinic keratosis     History of skin cancer     Diverticulitis     History of basal cell cancer     Dermal nevus     Dermatitis, seborrheic     Primary insomnia       FH:  Family History   Problem Relation Age of Onset     Diabetes Father      Heart Disease Father      Hypertension Mother      Cancer No family hx of         No family history of skin cancer     Skin Cancer No family hx of      Glaucoma No family hx of      Macular Degeneration No family hx of        SH:  Social History     Socioeconomic History     Marital status:      Spouse name: Not on file     Number of children: Not on file     Years of education: Not on file     Highest education level: Not on file   Occupational History     Not on file   Tobacco Use     Smoking status: Never Smoker     Smokeless tobacco: Never Used   Substance and Sexual Activity     Alcohol use: Yes     Drug use: No     Sexual activity: Yes     Partners: Female     Birth control/protection: Condom   Other Topics Concern     Parent/sibling w/ CABG, MI or angioplasty before 65F 55M? 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     Social Determinants of Health     Financial Resource Strain:      Difficulty of Paying Living Expenses:    Food Insecurity:      Worried About Running Out of Food in the Last Year:      Ran Out of Food in the Last Year:    Transportation Needs:      Lack of Transportation (Medical):      Lack of Transportation (Non-Medical):    Physical Activity:      Days of Exercise per Week:      Minutes of Exercise per Session:    Stress:      Feeling of Stress :    Social Connections:       Frequency of Communication with Friends and Family:      Frequency of Social Gatherings with Friends and Family:      Attends Mandaeism Services:      Active Member of Clubs or Organizations:      Attends Club or Organization Meetings:      Marital Status:    Intimate Partner Violence:      Fear of Current or Ex-Partner:      Emotionally Abused:      Physically Abused:      Sexually Abused:        MEDS:  See EMR, reviewed  ALL:  See EMR, reviewed    REVIEW OF SYSTEMS:  CONSTITUTIONAL:NEGATIVE for fever, chills, change in weight  INTEGUMENTARY/SKIN: NEGATIVE for worrisome rashes, moles or lesions  EYES: NEGATIVE for vision changes or irritation  ENT/MOUTH: NEGATIVE for ear, mouth and throat problems  RESP:NEGATIVE for significant cough or SOB  BREAST: NEGATIVE for masses, tenderness or discharge  CV: NEGATIVE for chest pain, palpitations or peripheral edema  GI: NEGATIVE for nausea, abdominal pain, heartburn, or change in bowel habits  :NEGATIVE for frequency, dysuria, or hematuria  :NEGATIVE for frequency, dysuria, or hematuria  NEURO: NEGATIVE for weakness, dizziness or paresthesias  ENDOCRINE: NEGATIVE for temperature intolerance, skin/hair changes  HEME/ALLERGY/IMMUNE: NEGATIVE for bleeding problems  PSYCHIATRIC: NEGATIVE for changes in mood or affect        Objective: The left thumb is nontender at the MCP or CMC joint.  CMC grind test is mildly uncomfortable.  He has mild tenderness at the STT joint.  Is nontender at the ulnar collateral ligament.  Finkelstein's test is negative.  Thumb strength to abduction, opponens, cocking is intact with no weakness noted.  Grasp strength is normal.  Sensation is intact.  Skin is normal.  Appropriate in conversation affect.    We went over x-rays that show mild degenerative change more prominently at the STT joint      Assessment bilateral thumb DJD    Plan: We went over conservative care options including Tylenol, nonsteroidal anti-inflammatories and their side  effects, paraffin wax treatment, custom splints, strength protocols for the hand, indications for cortisone injections, indications for surgery.  He has used ibuprofen in the past.  We discussed that if ibuprofen is not helpful there are other prescription medicines such as Celebrex that could be tried.  We discussed lab monitoring 3 times a year for nonsteroidal anti-inflammatories that are used consistently.  He would like to see hand therapy and referral was placed.

## 2021-11-08 ENCOUNTER — THERAPY VISIT (OUTPATIENT)
Dept: OCCUPATIONAL THERAPY | Facility: CLINIC | Age: 66
End: 2021-11-08
Attending: FAMILY MEDICINE
Payer: COMMERCIAL

## 2021-11-08 DIAGNOSIS — M18.12 OSTEOARTHRITIS OF LEFT THUMB: ICD-10-CM

## 2021-11-08 DIAGNOSIS — M18.11 OSTEOARTHRITIS OF RIGHT THUMB: ICD-10-CM

## 2021-11-08 PROCEDURE — 97165 OT EVAL LOW COMPLEX 30 MIN: CPT | Mod: GO

## 2021-11-08 PROCEDURE — 97760 ORTHOTIC MGMT&TRAING 1ST ENC: CPT | Mod: GO

## 2021-11-08 PROCEDURE — 97110 THERAPEUTIC EXERCISES: CPT | Mod: GO

## 2021-11-08 NOTE — PROGRESS NOTES
[facility] Initial Evaluation     Current Date: 11/8/2021    Diagnosis: Osteoarthritis of right and left thumbs     Subjective:  Eric Espinosa is a 66 year old male.    Answers for HPI/ROS submitted by the patient on 11/5/2021  Reason for Visit:: Left and right thumbs  When problem began:: 11/5/2021  How problem occurred:: there was no injury. Issue is arthritis.  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    Previous treatment: otc braces for both thumbs   Barriers include:none  Mobility: No difficulty  Transportation: drives  Currently working in normal job without restrictions  Leisure activities/hobbies: biking, exercising    Functional Outcome Measure:   Upper Extremity Functional Index Score:  SCORE:   Column Totals: /80: (P) 47   (A lower score indicates greater disability.)    Objective:  Pain Level (Scale 0-10)   11/8/2021   At Rest R: 0/10  L: 1/10   With Use R: 3/10  L: 6-7/10     Pain Description  Date 11/8/2021   Location R: MP Joint   L: MP Joint   Pain Quality Sharp, Shooting and L feels like a pop    Frequency intermittent     Pain is worst  daytime   Exacerbated by  twisting, pulling    Relieved by rest and otc braces   Progression L is worsening, R is unchanged      ROM  Thumb 11/8/2021 11/8/2021   AROM  (PROM) R L   MP -13/63 -16/67   IP -3/65 -4/64   RABD 47  39   PABD 37 43     Thumb Observation/Appearance   - none  + mild    ++ moderate    +++ severe     11/8/2021   Shoulder deformity present over CMC R: +  L: +   Edema over the CMC joint R: +  L: +   Noted collapse of MP into hyperextension during pinch R: -  L: +      Provocative Tests  Pain Report:  - none    + mild    ++ moderate    +++ severe     11/8/2021   Crepitus present R: -  L: -   CMC Adduction Stress Test R: 1/10  L: 3/10   CMC Extension Stress Test R: 0/10  L: 3/10   Finkelstein's R: 0/10  L: 0/10       Strength   (Measured in pounds)  Pain Report: - none  + mild    ++ moderate    +++ severe    11/8/2021 11/8/2021   Trials R L   1  2  3 86 72   Average 86 72     Lat Pinch 11/8/2021 11/8/2021   Trials R L   1  2  3 19 12   Average 19 12     3 Pt Pinch 11/8/2021 11/8/2021   Trials R L   1  2  3 14 4 (4/10 pain)   Average 14 4     Palpation   Pain Report:  - none    + mild    ++ moderate    +++ severe    11/8/2021   CMC Joint Line R: 2/10  L: 1/10   Thenar Eminence R: 1/10  L: 2/10   Web Space R: 0/10  L: 0/10   1st DC R: 0/10  L: 0/10   Radial Styloid R: 2/10  L: 2/10   FCR R: 0/10  L: 0/10     Assessment:  Patient presents with symptoms consistent with diagnosis of bilateralCMC thumb arthritis, with conservative intervention.    Patient's limitations or Problem List includes:  Pain and Weakness of the bilateral thumb which interferes with the patient's ability to perform Self Care Tasks (dressing), Work Tasks, Sleep Patterns, Recreational Activities, Household Chores and Driving  as compared to previous level of function.    Rehab Potential:  Good - Return to full activity, some limitations    Patient will benefit from skilled Occupational Therapy to increase ROM and overall strength and decrease pain to return to previous activity level and resume normal daily tasks and to reach their rehab potential.    Barriers to Learning:  No barrier    Communication Issues:  Patient appears to be able to clearly  communicate and understand verbal and written communication and follow directions correctly.    Chart Review: Chart Review and Simple history review with patient    Identified Performance Deficits: bathing/showering, dressing, hygiene and grooming, driving and community mobility, health management and maintenance, home establishment and management, meal preparation and cleanup, shopping, sleep, work and leisure activities    Assessment of Occupational Performance:  5 or more Performance Deficits    Clinical Decision Making (Complexity): Low complexity    Treatment Explanation:  The following has been discussed with the patient:    RX ordered/plan of care  Anticipated outcomes  Possible risks and side effects    Plan:  Frequency:  1 X week, once daily  Duration:  for 6 weeks    Treatment Plan:    Modalities:    US and Paraffin   Therapeutic Exercise:    AROM, AAROM and PROM  Therapeutic Activities:   Functional activities   Neuromuscular re-ed:   Nerve Gliding and Stabilization  Manual Techniques:   Myofascial release and Manual edema mobilization  Orthotic Fabrication:    Static  Self Care:    Self Care Tasks, Ergonomic Considerations and Work Tasks    Discharge Plan:  Achieve all LTG  [place] in home treatment program.  Reach maximal therapeutic benefit.    Home Program:  Orthosis Wear and Care  EMR Notes  HEP - Sets  Reps  Sessions per day  Notes  Warmth  EMR Notes  HEP - Sets  Reps  Sessions per day  Notes  Thumb Stabilization Web Space Release Method 1 with Clip  EMR Notes  HEP - Sets  Reps 10  Sessions per day 2-3  Notes wear clip 1-3 minutes as tolerated  Thumb Stabilization C with ball  EMR Notes  HEP - Sets  Reps 10  Sessions per day 2-3  Notes  Thumb Stabilization 1st Dorsal Interosseous  EMR Notes  HEP - Sets  Reps 10  Sessions per day 2-3  Notes Hold each rep for 5-10 seconds    Next Visit:  Review Orthosis Fit - see if he would like an orthosis for his right hand.   Review HEP  Joint  mobs  MFR

## 2021-11-16 ENCOUNTER — THERAPY VISIT (OUTPATIENT)
Dept: OCCUPATIONAL THERAPY | Facility: CLINIC | Age: 66
End: 2021-11-16
Payer: COMMERCIAL

## 2021-11-16 DIAGNOSIS — M18.12 OSTEOARTHRITIS OF LEFT THUMB: ICD-10-CM

## 2021-11-16 DIAGNOSIS — M18.11 OSTEOARTHRITIS OF RIGHT THUMB: ICD-10-CM

## 2021-11-16 PROCEDURE — 97763 ORTHC/PROSTC MGMT SBSQ ENC: CPT | Mod: GO

## 2021-11-16 PROCEDURE — 97110 THERAPEUTIC EXERCISES: CPT | Mod: GO

## 2021-11-16 PROCEDURE — 97140 MANUAL THERAPY 1/> REGIONS: CPT | Mod: GO

## 2021-11-16 NOTE — PROGRESS NOTES
SOAP note objective information for 11/16/2021.  Objective:  Pain Level (Scale 0-10)   11/8/2021 11/16/2021   At Rest R: 0/10  L: 1/10 R: 0/10  L: 1/10   With Use R: 3/10  L: 6-7/10 R: 2/10  L: 5/10     ROM  Thumb 11/8/2021 11/8/2021 11/16/2021 11/16/2021   AROM  (PROM) R L R L   MP -13/63 -16/67 -11/53 -9/61   IP -3/65 -4/64 -3/53 -1/59   RABD 47 39 NT NT   PABD 37 43 NT NT     Strength   (Measured in pounds)  Pain Report: - none  + mild    ++ moderate    +++ severe     Lat Pinch 11/8/2021 11/8/2021 11/16/2021 11/16/2021   Trials R L R L   1  2  3 19 12 21 17 (1-2/10 pain)   Average 19 12 21 17     3 Pt Pinch 11/8/2021 11/8/2021 11/16/2021 11/16/2021   Trials R L R L   1  2  3 14 4 (4/10 pain) 16 5 (5/10 pain)   Average 14 4 16 5     Please refer to the daily flowsheet for treatment today, total treatment time and time spent performing 1:1 timed codes.

## 2021-11-16 NOTE — PROGRESS NOTES
[facility] Initial Evaluation     Current Date: 11/8/2021    Diagnosis: Osteoarthritis of right and left thumbs     Subjective:  Eric Espinosa is a 66 year old male.    Answers for HPI/ROS submitted by the patient on 11/5/2021  Reason for Visit:: Left and right thumbs  When problem began:: 11/5/2021  How problem occurred:: there was no injury. Issue is arthritis.  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    Previous treatment: otc braces for both thumbs   Barriers include:none  Mobility: No difficulty  Transportation: drives  Currently working in normal job without restrictions  Leisure activities/hobbies: biking, exercising    Functional Outcome Measure:   Upper Extremity Functional Index Score:  SCORE:   Column Totals: /80: (P) 47   (A lower score indicates greater disability.)    Objective:  Pain Level (Scale 0-10)   11/8/2021   At Rest R: 0/10  L: 1/10   With Use R: 3/10  L: 6-7/10     Pain Description  Date 11/8/2021   Location R: MP Joint   L: MP Joint   Pain Quality Sharp, Shooting and L feels like a pop    Frequency intermittent     Pain is worst  daytime   Exacerbated by  twisting, pulling    Relieved by rest and otc braces   Progression L is worsening, R is unchanged      ROM  Thumb 11/8/2021 11/8/2021   AROM  (PROM) R L   MP -13/63 -16/67   IP -3/65 -4/64   RABD 47  39   PABD 37 43     Thumb Observation/Appearance   - none  + mild    ++ moderate    +++ severe     11/8/2021   Shoulder deformity present over CMC R: +  L: +   Edema over the CMC joint R: +  L: +   Noted collapse of MP into hyperextension during pinch R: -  L: +      Provocative Tests  Pain Report:  - none    + mild    ++ moderate    +++ severe     11/8/2021   Crepitus present R: -  L: -   CMC Adduction Stress Test R: 1/10  L: 3/10   CMC Extension Stress Test R: 0/10  L: 3/10   Finkelstein's R: 0/10  L: 0/10       Strength   (Measured in pounds)  Pain Report: - none  + mild    ++ moderate    +++ severe    11/8/2021 11/8/2021   Trials R L   1  2  3 86 72   Average 86 72     Lat Pinch 11/8/2021 11/8/2021   Trials R L   1  2  3 19 12   Average 19 12     3 Pt Pinch 11/8/2021 11/8/2021   Trials R L   1  2  3 14 4 (4/10 pain)   Average 14 4     Palpation   Pain Report:  - none    + mild    ++ moderate    +++ severe    11/8/2021   CMC Joint Line R: 2/10  L: 1/10   Thenar Eminence R: 1/10  L: 2/10   Web Space R: 0/10  L: 0/10   1st DC R: 0/10  L: 0/10   Radial Styloid R: 2/10  L: 2/10   FCR R: 0/10  L: 0/10     Assessment:  Patient presents with symptoms consistent with diagnosis of bilateralCMC thumb arthritis, with conservative intervention.    Patient's limitations or Problem List includes:  Pain and Weakness of the bilateral thumb which interferes with the patient's ability to perform Self Care Tasks (dressing), Work Tasks, Sleep Patterns, Recreational Activities, Household Chores and Driving  as compared to previous level of function.    Rehab Potential:  Good - Return to full activity, some limitations    Patient will benefit from skilled Occupational Therapy to increase ROM and overall strength and decrease pain to return to previous activity level and resume normal daily tasks and to reach their rehab potential.    Barriers to Learning:  No barrier    Communication Issues:  Patient appears to be able to clearly  communicate and understand verbal and written communication and follow directions correctly.    Chart Review: Chart Review and Simple history review with patient    Identified Performance Deficits: bathing/showering, dressing, hygiene and grooming, driving and community mobility, health management and maintenance, home establishment and management, meal preparation and cleanup, shopping, sleep, work and leisure activities    Assessment of Occupational Performance:  5 or more Performance Deficits    Clinical Decision Making (Complexity): Low complexity    Treatment Explanation:  The following has been discussed with the patient:    RX ordered/plan of care  Anticipated outcomes  Possible risks and side effects    Plan:  Frequency:  1 X week, once daily  Duration:  for 6 weeks    Treatment Plan:    Modalities:    US and Paraffin   Therapeutic Exercise:    AROM, AAROM and PROM  Therapeutic Activities:   Functional activities   Neuromuscular re-ed:   Nerve Gliding and Stabilization  Manual Techniques:   Myofascial release and Manual edema mobilization  Orthotic Fabrication:    Static  Self Care:    Self Care Tasks, Ergonomic Considerations and Work Tasks    Discharge Plan:  Achieve all LTG  [place] in home treatment program.  Reach maximal therapeutic benefit.    Home Program:  Orthosis Wear and Care  EMR Notes  HEP - Sets  Reps  Sessions per day  Notes  Warmth  EMR Notes  HEP - Sets  Reps  Sessions per day  Notes  Thumb Stabilization Web Space Release Method 1 with Clip  EMR Notes  HEP - Sets  Reps 10  Sessions per day 2-3  Notes wear clip 1-3 minutes as tolerated  Thumb Stabilization C with ball  EMR Notes  HEP - Sets  Reps 10  Sessions per day 2-3  Notes  Thumb Stabilization 1st Dorsal Interosseous  EMR Notes  HEP - Sets  Reps 10  Sessions per day 2-3  Notes Hold each rep for 5-10 seconds    Next Visit:  Review Orthosis Fit - see if he would like an orthosis for his right hand.   Review HEP  Joint  mobs  MFR

## 2021-11-19 ENCOUNTER — IMMUNIZATION (OUTPATIENT)
Dept: NURSING | Facility: CLINIC | Age: 66
End: 2021-11-19
Payer: COMMERCIAL

## 2021-11-19 PROCEDURE — 91300 PR COVID VAC PFIZER DIL RECON 30 MCG/0.3 ML IM: CPT

## 2021-11-19 PROCEDURE — 0004A PR COVID VAC PFIZER DIL RECON 30 MCG/0.3 ML IM: CPT

## 2021-12-17 ENCOUNTER — THERAPY VISIT (OUTPATIENT)
Dept: OCCUPATIONAL THERAPY | Facility: CLINIC | Age: 66
End: 2021-12-17
Payer: COMMERCIAL

## 2021-12-17 DIAGNOSIS — M18.12 OSTEOARTHRITIS OF LEFT THUMB: ICD-10-CM

## 2021-12-17 DIAGNOSIS — M18.11 OSTEOARTHRITIS OF RIGHT THUMB: ICD-10-CM

## 2021-12-17 PROCEDURE — 97110 THERAPEUTIC EXERCISES: CPT | Mod: GO | Performed by: OCCUPATIONAL THERAPIST

## 2021-12-17 PROCEDURE — 97763 ORTHC/PROSTC MGMT SBSQ ENC: CPT | Mod: GO | Performed by: OCCUPATIONAL THERAPIST

## 2021-12-17 NOTE — PROGRESS NOTES
SOAP note objective information for 12/17/2021.    Diagnosis: Osteoarthritis of right and left thumbs     Subjective:  Eric Espinosa is a 66 year old male.    Objective:  Pain Level (Scale 0-10)   11/8/2021 11/16/2021   At Rest R: 0/10  L: 1/10 R: 0/10  L: 1/10   With Use R: 3/10  L: 6-7/10 R: 2/10  L: 5/10     Pain Description  Date 11/8/2021   Location R: MP Joint   L: MP Joint   Pain Quality Sharp, Shooting and L feels like a pop    Frequency intermittent     Pain is worst  daytime   Exacerbated by  twisting, pulling    Relieved by rest and otc braces   Progression L is worsening, R is unchanged      ROM  Thumb 11/8/2021 11/8/2021 11/16/2021 11/16/2021   AROM  (PROM) R L R L   MP -13/63 -16/67 -11/53 -9/61   IP -3/65 -4/64 -3/53 -1/59   RABD 47 39 NT NT   PABD 37 43 NT NT     Thumb Observation/Appearance   - none  + mild    ++ moderate    +++ severe     11/8/2021   Shoulder deformity present over CMC R: +  L: +   Edema over the CMC joint R: +  L: +   Noted collapse of MP into hyperextension during pinch R: -  L: +      Provocative Tests  Pain Report:  - none    + mild    ++ moderate    +++ severe     11/8/2021   Crepitus present R: -  L: -   CMC Adduction Stress Test R: 1/10  L: 3/10   CMC Extension Stress Test R: 0/10  L: 3/10   Finkelstein's R: 0/10  L: 0/10     Strength   (Measured in pounds)  Pain Report: - none  + mild    ++ moderate    +++ severe    12/17/2021 12/17/2021   Trials R L   1  2  3 81 71   Average 81 71     Lat Pinch 11/8/2021 11/8/2021 11/16/2021 11/16/2021 12/17/2021 12/17/2021   Trials R L R L R L   1  2  3 19 12 21 17 (1-2/10 pain) 20 17 +   Average 19 12 21 17 20 17     3 Pt Pinch 11/8/2021 11/8/2021 11/16/2021 11/16/2021 12/17/2021 12/17/2021   Trials R L R L R L   1  2  3 14 4 (4/10 pain) 16 5 (5/10 pain) 15 5 ++   Average 14 4 16 5 15 5 ++     Palpation   Pain Report:  - none    + mild    ++ moderate    +++ severe    11/8/2021   CMC Joint Line R: 2/10  L: 1/10   Thenar Eminence R:  1/10  L: 2/10   Web Space R: 0/10  L: 0/10   1st DC R: 0/10  L: 0/10   Radial Styloid R: 2/10  L: 2/10   FCR R: 0/10  L: 0/10     Please refer to the daily flowsheet for treatment today, total treatment time and time spent performing 1:1 timed codes.    Home Program:  Orthosis Wear and Care  Warmth  Thumb Stabilization Web Space Release Method 1 with Clip  Thumb Stabilization C with ball  Thumb Stabilization 1st Dorsal Interosseous  Icing - MP    Next Visit:  Review HEP  Joint mobs  MFR

## 2022-01-22 ENCOUNTER — HEALTH MAINTENANCE LETTER (OUTPATIENT)
Age: 67
End: 2022-01-22

## 2022-01-27 ENCOUNTER — OFFICE VISIT (OUTPATIENT)
Dept: DERMATOLOGY | Facility: CLINIC | Age: 67
End: 2022-01-27
Payer: COMMERCIAL

## 2022-01-27 DIAGNOSIS — L82.0 INFLAMED SEBORRHEIC KERATOSIS: ICD-10-CM

## 2022-01-27 DIAGNOSIS — D18.01 CHERRY ANGIOMA: ICD-10-CM

## 2022-01-27 DIAGNOSIS — L82.1 SEBORRHEIC KERATOSIS: ICD-10-CM

## 2022-01-27 DIAGNOSIS — L81.4 SOLAR LENTIGO: ICD-10-CM

## 2022-01-27 DIAGNOSIS — L70.0 OPEN COMEDONE: ICD-10-CM

## 2022-01-27 DIAGNOSIS — L57.0 ACTINIC KERATOSIS: ICD-10-CM

## 2022-01-27 DIAGNOSIS — Z85.828 HISTORY OF NONMELANOMA SKIN CANCER: ICD-10-CM

## 2022-01-27 DIAGNOSIS — D22.9 MULTIPLE BENIGN NEVI: Primary | ICD-10-CM

## 2022-01-27 PROCEDURE — 99213 OFFICE O/P EST LOW 20 MIN: CPT | Mod: 25 | Performed by: DERMATOLOGY

## 2022-01-27 PROCEDURE — 17003 DESTRUCT PREMALG LES 2-14: CPT | Mod: 59 | Performed by: DERMATOLOGY

## 2022-01-27 PROCEDURE — 17000 DESTRUCT PREMALG LESION: CPT | Mod: 59 | Performed by: DERMATOLOGY

## 2022-01-27 PROCEDURE — 10040 EXTRACTION: CPT | Performed by: DERMATOLOGY

## 2022-01-27 RX ORDER — NIACINAMIDE 500 MG
500 TABLET ORAL 2 TIMES DAILY WITH MEALS
Qty: 180 TABLET | Refills: 3 | Status: SHIPPED | OUTPATIENT
Start: 2022-01-27

## 2022-01-27 ASSESSMENT — PAIN SCALES - GENERAL: PAINLEVEL: NO PAIN (0)

## 2022-01-27 NOTE — PROGRESS NOTES
[facility] Dermatology Note  Encounter Date: Jan 27, 2022  Office Visit     Dermatology Problem List:  Last FBSE: 11/10/20  1. History of NMSC  -current tx: niacinamide 500 mg BID for chemoprevention  -BCC, nose, s/p Mohs surgery 2015   -BCC, L superior shoulder ([facility] 2013)  -BCC, forehead (2012 w/ Dr. Fernández in [place], [place]).   2. Actinic keratoses, forehead s/p cryo 1/27/22  3. Hemangioma L shoudler s/p excision 4/26/2016 (suspected lymph node)  4. Benign Bx  - IDN, central forehead, s/p shave bx 11/1/19  5 Seborrheic dermatitis  - patch on left temporal scalp with predominant scale  - Current tx: Alternate Selsan blue and head & shoulders for shampooing, lidex solution as needed (discussed 10/19/18)  6. Balanitis  - Current Tx: hydrocortisone 2.5% ointment BID PRN  ____________________________________________    Assessment & Plan:     1. Benign lesions: Multiple benign nevi, solar lentigos, seborrheic keratoses, cherry angiomas. Explained to patient benign nature of lesion. No treatment is necessary at this time unless the lesion changes or becomes symptomatic.   - ABCDs of melanoma were discussed and self skin checks were advised.  - Sun precaution was advised including the use of sun screens of SPF 30 or higher, sun protective clothing, and avoidance of tanning beds.     2. History of nonmelanoma skin cancer  - No clincial evidence of recurrence  - ABCDs of melanoma were discussed and self skin checks were advised.   - Sun precaution was advised including the use of sun screens of SPF 30 or higher, sun protective clothing, and avoidance of tanning beds.  - We will continue to monitor the spot  - Start Niacinamide 500 mg BID for chemoprevention    3. Actinic Keratosis  - s/p cryo today, see procedure note below    4. ISK, left hand and shoulder  - Discussed the natural history and benign nature of this lesion. Patient declines treatment for this.    5. Inflamed comedone, R lateral  canthus.   - Lesion was extracted using comedone extractor as above    Procedures Performed:   - Cryotherapy procedure note, location(s): forehead and above the right eyebrow. After verbal consent and discussion of risks and benefits including, but not limited to, dyspigmentation/scar, blister, and pain, 5 lesion(s) was(were) treated with 1-2 mm freeze border for 1-2 cycles with liquid nitrogen. Post cryotherapy instructions were provided.  - Lesion on the right lateral canthus was cleansed with alcohol and cyst contents were expressed using a comedone extractor. Patient tolerated the procedure well.     Follow-up: 1 year(s) in-person, or earlier for new or changing lesions    Staff and Scribe:     Scribe Disclosure:  I, Lanre Fong, am serving as a scribe to document services personally performed by Fitz Lin MD based on data collection and the provider's statements to me.     Provider Disclosure:   The documentation recorded by the scribe accurately reflects the services I personally performed and the decisions made by me.    Fitz Lin MD    Department of Dermatology  [facility]: Phone: [phone], Fax:[phone]  [facility]: Phone: [phone] Fax: [phone]  ____________________________________________    CC: Skin Check (pt states he is here for full body skin check, spot on scalp and face. )    HPI:  Mr. Eric Espinosa is a(n) 66 year old male who presents today as a return patient for follow up skin check. Patient was last seen by Dr. Fournier on 11/10/20. Last visit was notable for suspected balanitis and patient was started on hydrocortisone 2.5% ointment for this.    Today, patient reports a concerning bump on the top of his scalp. He denies any scaliness, pruritis, pain, or bleeding from this bump. He reports that this bump has been there for a while and has been  examined before. He denies any changes to this bump. He denies any pain with palpation. Patient additionally notes scaly patches on his forehead and a bump near his nose which he would like examined.    Patient notes that he does regularly use sunscreen when he is outside.    Patient is otherwise feeling well, without additional skin concerns.    Labs Reviewed:  N/A    Physical Exam:  Vitals: There were no vitals taken for this visit.  SKIN: Full skin, which includes the head/face, both arms, chest, back, abdomen,both legs, genitalia and/or groin buttocks, digits and/or nails, was examined.  - There are erythematous macules with overyling adherent scale on the forehead and above the right eyebrow.   - There are dome shaped bright red papules on the trunk and extremities.   - Multiple regular brown pigmented macules and papules are identified on the trunk and extremities.   - Scattered brown macules on sun exposed areas.  - There are waxy stuck on tan to brown papules on the trunk and extremities.  - There is a tan to brown waxy stuck on papule with surrounding erythema on the left shoulder.   - No other lesions of concern on areas examined.     Medications:  Current Outpatient Medications   Medication     ARTIFICIAL TEAR OP     fluocinonide (LIDEX) 0.05 % solution     ibuprofen (ADVIL,MOTRIN) 200 MG tablet     Lemborexant (DAYVIGO) 5 MG TABS     MELATONIN PO     niacinamide 500 MG tablet     zaleplon (SONATA) 5 MG capsule     zolpidem (AMBIEN) 5 MG tablet     No current facility-administered medications for this visit.     Facility-Administered Medications Ordered in Other Visits   Medication     May continue current IV fluid if patient has IV fluids infusing until discharge.     ondansetron (ZOFRAN-ODT) ODT tab 4 mg    Or     ondansetron (ZOFRAN) injection 4 mg     sodium chloride (PF) 0.9% PF flush 3 mL      Past Medical History:   Patient Active Problem List   Diagnosis     Psoriasis     Neoplasm of uncertain  behavior of skin     AK (actinic keratosis)     BCC (basal cell carcinoma), trunk     Actinic keratosis     History of skin cancer     Diverticulitis     History of basal cell cancer     Dermal nevus     Dermatitis, seborrheic     Primary insomnia     Osteoarthritis of right thumb     Osteoarthritis of left thumb     Past Medical History:   Diagnosis Date     Arthritis      Autoimmune disease (H)      Basal cell carcinoma      Chronic tonsillitis      Hoarseness      Skin cancer

## 2022-01-27 NOTE — LETTER
1/27/2022       RE: Eric Espinosa  [address] [place] [address]     Dear Colleague,    Thank you for referring your patient, Eric Espinosa, to the [facility] DERMATOLOGY CLINIC [place] at [facility]. Please see a copy of my visit note below.    [facility] Dermatology Note  Encounter Date: Jan 27, 2022  Office Visit     Dermatology Problem List:  Last FBSE: 11/10/20  1. History of NMSC  -current tx: niacinamide 500 mg BID for chemoprevention  -BCC, nose, s/p Mohs surgery 2015   -BCC, L superior shoulder ([facility] 2013)  -BCC, forehead (2012 w/ Dr. Fernández in [place], [place]).   2. Actinic keratoses, forehead s/p cryo 1/27/22  3. Hemangioma L shoudler s/p excision 4/26/2016 (suspected lymph node)  4. Benign Bx  - IDN, central forehead, s/p shave bx 11/1/19  5 Seborrheic dermatitis  - patch on left temporal scalp with predominant scale  - Current tx: Alternate Selsan blue and head & shoulders for shampooing, lidex solution as needed (discussed 10/19/18)  6. Balanitis  - Current Tx: hydrocortisone 2.5% ointment BID PRN  ____________________________________________    Assessment & Plan:     1. Benign lesions: Multiple benign nevi, solar lentigos, seborrheic keratoses, cherry angiomas. Explained to patient benign nature of lesion. No treatment is necessary at this time unless the lesion changes or becomes symptomatic.   - ABCDs of melanoma were discussed and self skin checks were advised.  - Sun precaution was advised including the use of sun screens of SPF 30 or higher, sun protective clothing, and avoidance of tanning beds.     2. History of nonmelanoma skin cancer  - No clincial evidence of recurrence  - ABCDs of melanoma were discussed and self skin checks were advised.   - Sun precaution was advised including the use of sun screens of SPF 30 or higher, sun protective clothing, and avoidance of tanning beds.  - We will  continue to monitor the spot  - Start Niacinamide 500 mg BID for chemoprevention    3. Actinic Keratosis  - s/p cryo today, see procedure note below    4. ISK, left hand and shoulder  - Discussed the natural history and benign nature of this lesion. Patient declines treatment for this.    5. Inflamed comedone, R lateral canthus.   - Lesion was extracted using comedone extractor as above    Procedures Performed:   - Cryotherapy procedure note, location(s): forehead and above the right eyebrow. After verbal consent and discussion of risks and benefits including, but not limited to, dyspigmentation/scar, blister, and pain, 5 lesion(s) was(were) treated with 1-2 mm freeze border for 1-2 cycles with liquid nitrogen. Post cryotherapy instructions were provided.  - Lesion on the right lateral canthus was cleansed with alcohol and cyst contents were expressed using a comedone extractor. Patient tolerated the procedure well.     Follow-up: 1 year(s) in-person, or earlier for new or changing lesions    Staff and Scribe:     Scribe Disclosure:  I, Lanre Fong, am serving as a scribe to document services personally performed by Fitz Lin MD based on data collection and the provider's statements to me.     Provider Disclosure:   The documentation recorded by the scribe accurately reflects the services I personally performed and the decisions made by me.    Fitz Lin MD    Department of Dermatology  [facility]: Phone: [phone], Fax:[phone]  [facility]: Phone: [phone] Fax: [phone]  ____________________________________________    CC: Skin Check (pt states he is here for full body skin check, spot on scalp and face. )    HPI:  Mr. Eric Espinosa is a(n) 66 year old male who presents today as a return patient for follow up skin check. Patient was last seen by Dr. Fournier on  11/10/20. Last visit was notable for suspected balanitis and patient was started on hydrocortisone 2.5% ointment for this.    Today, patient reports a concerning bump on the top of his scalp. He denies any scaliness, pruritis, pain, or bleeding from this bump. He reports that this bump has been there for a while and has been examined before. He denies any changes to this bump. He denies any pain with palpation. Patient additionally notes scaly patches on his forehead and a bump near his nose which he would like examined.    Patient notes that he does regularly use sunscreen when he is outside.    Patient is otherwise feeling well, without additional skin concerns.    Labs Reviewed:  N/A    Physical Exam:  Vitals: There were no vitals taken for this visit.  SKIN: Full skin, which includes the head/face, both arms, chest, back, abdomen,both legs, genitalia and/or groin buttocks, digits and/or nails, was examined.  - There are erythematous macules with overyling adherent scale on the forehead and above the right eyebrow.   - There are dome shaped bright red papules on the trunk and extremities.   - Multiple regular brown pigmented macules and papules are identified on the trunk and extremities.   - Scattered brown macules on sun exposed areas.  - There are waxy stuck on tan to brown papules on the trunk and extremities.  - There is a tan to brown waxy stuck on papule with surrounding erythema on the left shoulder.   - No other lesions of concern on areas examined.     Medications:  Current Outpatient Medications   Medication     ARTIFICIAL TEAR OP     fluocinonide (LIDEX) 0.05 % solution     ibuprofen (ADVIL,MOTRIN) 200 MG tablet     Lemborexant (DAYVIGO) 5 MG TABS     MELATONIN PO     niacinamide 500 MG tablet     zaleplon (SONATA) 5 MG capsule     zolpidem (AMBIEN) 5 MG tablet     No current facility-administered medications for this visit.     Facility-Administered Medications Ordered in Other Visits   Medication      May continue current IV fluid if patient has IV fluids infusing until discharge.     ondansetron (ZOFRAN-ODT) ODT tab 4 mg    Or     ondansetron (ZOFRAN) injection 4 mg     sodium chloride (PF) 0.9% PF flush 3 mL      Past Medical History:   Patient Active Problem List   Diagnosis     Psoriasis     Neoplasm of uncertain behavior of skin     AK (actinic keratosis)     BCC (basal cell carcinoma), trunk     Actinic keratosis     History of skin cancer     Diverticulitis     History of basal cell cancer     Dermal nevus     Dermatitis, seborrheic     Primary insomnia     Osteoarthritis of right thumb     Osteoarthritis of left thumb     Past Medical History:   Diagnosis Date     Arthritis      Autoimmune disease (H)      Basal cell carcinoma      Chronic tonsillitis      Hoarseness      Skin cancer

## 2022-01-27 NOTE — PATIENT INSTRUCTIONS
Recommend niacinamide 500 mg twice daily. This is also available over-the-counter. Recommend 21st Century Prolonged Release Niacinamide 1000 mg once daily (2 tablets of 500 mg).     Cryotherapy    What is it?    Use of a very cold liquid, such as liquid nitrogen, to freeze and destroy abnormal skin cells that need to be removed    What should I expect?    Tenderness and redness    A small blister that might grow and fill with dark purple blood. There may be crusting.    More than one treatment may be needed if the lesions do not go away.    How do I care for the treated area?    Gently wash the area with your hands when bathing.    Use a thin layer of Vaseline to help with healing. You may use a Band-Aid.     The area should heal within 7-10 days and may leave behind a pink or lighter color.     Do not use an antibiotic or Neosporin ointment.     You may take acetaminophen (Tylenol) for pain.     Call your doctor if you have:    Severe pain    Signs of infection (warmth, redness, cloudy yellow drainage, and or a bad smell)    Questions or concerns    Who should I call with questions?       [facility]: [phone]       [facility]: [phone]       For urgent needs outside of business hours call the [facility] at [phone] and ask for the dermatology resident on call

## 2022-02-25 PROBLEM — M18.11 OSTEOARTHRITIS OF RIGHT THUMB: Status: RESOLVED | Noted: 2021-11-08 | Resolved: 2022-02-25

## 2022-02-25 PROBLEM — M18.12 OSTEOARTHRITIS OF LEFT THUMB: Status: RESOLVED | Noted: 2021-11-08 | Resolved: 2022-02-25

## 2022-06-30 DIAGNOSIS — G47.9 SLEEP DISORDER: ICD-10-CM

## 2022-06-30 RX ORDER — ZALEPLON 5 MG/1
5 CAPSULE ORAL
Qty: 30 CAPSULE | Refills: 5 | Status: SHIPPED | OUTPATIENT
Start: 2022-06-30 | End: 2023-06-20

## 2022-06-30 NOTE — TELEPHONE ENCOUNTER
Pending Prescriptions:                       Disp   Refills    zaleplon (SONATA) 5 MG capsule            30 cap*5            Sig: Take 1 capsule (5 mg) by mouth nightly as needed           for sleep    Last Written Prescription Date:  10/31/21  Last Fill Quantity: 30,   # refills: 5  Last Office Visit with [facility], [facility] or [facility] prescribing provider: 10/29/21  Future Office visit:   No

## 2022-08-04 ENCOUNTER — MYC MEDICAL ADVICE (OUTPATIENT)
Dept: INTERNAL MEDICINE | Facility: CLINIC | Age: 67
End: 2022-08-04

## 2022-08-04 DIAGNOSIS — Z12.11 SPECIAL SCREENING FOR MALIGNANT NEOPLASMS, COLON: Primary | ICD-10-CM

## 2022-08-19 ENCOUNTER — TELEPHONE (OUTPATIENT)
Dept: GASTROENTEROLOGY | Facility: CLINIC | Age: 67
End: 2022-08-19

## 2022-08-19 NOTE — TELEPHONE ENCOUNTER
Screening Questions    BlueKIND OF PREP RedLOCATION [review exclusion criteria] GreenSEDATION TYPE      1. Are you active on mychart? y    2. What insurance is in the chart? BCBS    3.   Ordering/Referring Provider: Lanre Garland MD       4. BMI   (If greater than 40 review exclusion criteria [PAC APPT IF [MAC] @ [facility])  24.7  [If yes, BMI OVER 40-EXTENDED PREP]      **(Sedation review/consideration needed)**  Do you have a legal guardian or Medical Power of    and/or are you able to give consent for your medical care?     Can give consent    5. Have you had a positive covid test in the last 90 days?   n -     6.  Are you currently on dialysis?   n [ If yes, G-PREP & HOSPITAL setting ONLY]     7.  Do you have chronic kidney disease?  n [ If yes, G-PREP ]    8.   Do you have a diagnosis of diabetes?   n   [ If yes, G-PREP ]    9.  On a regular basis do you go 3-5 days between bowel movements?   n   [ If yes, EXTENDED PREP]    10.  Are you taking any prescription pain medications on a routine schedule?    n -  [ If yes, EXTENDED PREP] [If yes, MAC]      11.   Do you have any chemical dependencies such as alcohol, street drugs, or methadone?    n [If yes, MAC]    12.   Do you have any history of post-traumatic stress syndrome, severe anxiety or history of psychosis?    n  [If yes, MAC]    13.  [FEMALES] Are you currently pregnant? n/a    If yes, how many weeks?       Respiratory/Heart Screening:  [If yes to any of the following HOSPITAL setting only]     14. Do you have Pulmonary Hypertension [Lungs]?   n       15. Do you have UNCONTROLLED asthma?   n     16.  Do you use daily home oxygen?  n      17. Do you have mod to severe Obstructive Sleep Apnea?         (OKAY @ [facility]  [facility]  SH  PH  RI  MG - if pt is not on OXYGEN)  n      18.   Have you had a heart or lung transplant?   n      19.   Have you had a stroke or Transient ischemic attack (TIA - aka  mini stroke ) within 6 months?  (If yes, please  review exclusion criteria)  n     20.   In the past 6 months, have you had any heart related issues including cardiomyopathy or heart attack?   n           If yes, did it require cardiac stenting or other implantable device?         21.   Do you have any implantable devices in your body (pacemaker, defib, LVAD)? (If yes, please review exclusion criteria)  n   22.  Do you take the medication Phentermine?     Yes-> Hold for 7 days before procedure.  Please consult your prescribing provider if you have questions about holding this medication.     No-> Continue to next question.    23. Do you take nitroglycerin?   n           If yes, how often?   (if yes, HOSPITAL setting ONLY)    24.  Are you currently taking any blood thinners?    [If yes, INFORM patient to follw up w/ ORDERING PROVIDER FOR BRIDGING INSTRUCTIONS]     n    25.   Do you transfer independently?                (If NO, please HOSPITAL setting ONLY)  y    26.   Preferred LOCAL Pharmacy for Pre Prescription:        [id number] - [place], [place] - [address] AT [facility] [address]    Scheduling Details  (Please ask for phone number if not scheduled by patient)      Caller : Eric Espinosa    Date of Procedure: 10/10  Surgeon: Jeremi  [place] [facility] [place]        Sedation Type: MODERATE l   Conscious Sedation- Needs  for 6 hours after the procedure  MAC/General-Needs  for 24 hours after procedure    n :[Pre-op Required] at [facility]  MG and OR for MAC sedation   (advise patient they will need a pre-op WITH IN 30 DAYS of procedure date)     Type of Procedure Scheduled:   Lower Endoscopy [Colonoscopy]    Which Colonoscopy Prep was Sent?:   [facility] - mag citrate recall      [facility] PATIENTS & [facility] PATIENTS NEEDS EXTENDED PREP       Informed patient they will need an adult  y  Cannot take any type of public or medical transportation alone    Pre-Procedure Covid test to be completed at [facility] or Externally: home test   **INFORMED OF HOME TESTING & LAB OPTION**        Confirmed Nurse will call to complete assessment y    Additional comments:

## 2022-08-30 ENCOUNTER — OFFICE VISIT (OUTPATIENT)
Dept: DERMATOLOGY | Facility: CLINIC | Age: 67
End: 2022-08-30
Payer: COMMERCIAL

## 2022-08-30 DIAGNOSIS — L82.1 SEBORRHEIC KERATOSIS: ICD-10-CM

## 2022-08-30 DIAGNOSIS — D49.2 NEOPLASM OF UNSPECIFIED BEHAVIOR OF BONE, SOFT TISSUE, AND SKIN: ICD-10-CM

## 2022-08-30 DIAGNOSIS — D22.9 MULTIPLE BENIGN NEVI: Primary | ICD-10-CM

## 2022-08-30 DIAGNOSIS — D18.01 CHERRY ANGIOMA: ICD-10-CM

## 2022-08-30 DIAGNOSIS — Z85.828 HISTORY OF NONMELANOMA SKIN CANCER: ICD-10-CM

## 2022-08-30 PROCEDURE — 88342 IMHCHEM/IMCYTCHM 1ST ANTB: CPT | Mod: 26 | Performed by: DERMATOLOGY

## 2022-08-30 PROCEDURE — 99213 OFFICE O/P EST LOW 20 MIN: CPT | Mod: 25 | Performed by: DERMATOLOGY

## 2022-08-30 PROCEDURE — 88341 IMHCHEM/IMCYTCHM EA ADD ANTB: CPT | Mod: 26 | Performed by: DERMATOLOGY

## 2022-08-30 PROCEDURE — 88341 IMHCHEM/IMCYTCHM EA ADD ANTB: CPT | Mod: TC | Performed by: DERMATOLOGY

## 2022-08-30 PROCEDURE — 11102 TANGNTL BX SKIN SINGLE LES: CPT | Mod: GC | Performed by: DERMATOLOGY

## 2022-08-30 PROCEDURE — 88305 TISSUE EXAM BY PATHOLOGIST: CPT | Mod: 26 | Performed by: DERMATOLOGY

## 2022-08-30 ASSESSMENT — PAIN SCALES - GENERAL: PAINLEVEL: NO PAIN (0)

## 2022-08-30 NOTE — PROGRESS NOTES
[facility] Dermatology Note  Encounter Date: Aug 30, 2022  Office Visit     Dermatology Problem List:  Last FBSE: 8/30/22  1. History of NMSC  -current tx: niacinamide 500 mg BID for chemoprevention  -BCC, nose, s/p Mohs surgery 2015   -BCC, L superior shoulder ([facility] [place] 2013)  -BCC, forehead (2012 w/ Dr. Fernández in [place], [place]).   2. Actinic keratoses, forehead s/p cryo 1/27/22  3. Hemangioma L shoulder s/p excision 4/26/2016 (suspected lymph node)  4. Benign Bx  - IDN, central forehead, s/p shave bx 11/1/19  5 Seborrheic dermatitis  - patch on left temporal scalp with predominant scale  - Current tx: Alternate Selsan blue and head & shoulders for shampooing, lidex solution as needed (discussed 10/19/18)  6. Balanitis  - Current Tx: hydrocortisone 2.5% ointment BID PRN    # Neoplasm of uncertain behavior, L neck  - s/p shave bx 8/30/22  ____________________________________________    Assessment & Plan:    # Neoplasm of uncertain behavior, L neck - ddx scar v BCC  Discussed options for management, including shave biopsy versus observation with photos. Patient elected to proceed with shave biopsy today.  - Shave biopsy, see procedure note below    # Benign lesions - SKs, cherry angiomas, lentigenes.  - No treatment required    # Multiple benign nevi.   - No concerning lesions today  - Monitor for ABCDEs of melanoma   - Continue sun protection - recommend SPF 30 or higher with frequent application   - Return sooner if noticing changing or symptomatic lesions    # History of NMSC. No evidence of recurrent disease.  - Continue photoprotection - recommend SPF 30 or higher with frequent reapplication  - Continue yearly skin exams  - Advised to monitor for changing, non-healing, bleeding, painful, changing, or otherwise symptomatic lesions  - Continue Niacinamide 500 mg BID for chemoprevention      Procedures Performed:   - Shave biopsy procedure note, location(s): L neck. After discussion of benefits  and risks including but not limited to bleeding, infection, scar, incomplete removal, recurrence, and non-diagnostic biopsy, written consent and photographs were obtained. The area was cleaned with isopropyl alcohol. 0.5mL of 1% lidocaine with epinephrine was injected to obtain adequate anesthesia of lesion(s). Shave biopsy at site(s) performed. Hemostasis was achieved with aluminium chloride. Petrolatum ointment and a sterile dressing were applied. The patient tolerated the procedure and no complications were noted. The patient was provided with verbal and written post care instructions.     Follow-up: 6-12 months pending path results    Staff and Resident:     Zandra Puga MD  PGY2 Dermatology Resident    Staff Physician Comments:   I saw and evaluated the patient with the resident and I agree with the assessment and plan.  I was present for the key portions of the above major procedure and examination.    Jolene Diallo MD    Department of Dermatology  [facility]: Phone: [phone], Fax: [phone]  9/1/2022     ____________________________________________    CC: Skin Check (Spot on neck of concern)    HPI:  Mr. Eric Espinosa is a(n) 67 year old male who presents today as a return patient for a FBSE. Last seen by Dr. Lin on 1/27/22, at which time patient underwent cryotherapy for treatment of AK on the forehead and ISKs on the left hand and shoulder.     Eric reports having a spot on the left side of his neck, present for 3 months duration which he states initially appeared like a pimple, that he wanted further evaluated today. Approximately two weeks ago, the spot appeared to self-resolve and fell off of his skin. Eric has been taking niacinamide 500 mg BID given his history of NMSC. He denies having a familial history of skin cancer. Wears sunscreen intermittently when outside. He reports having an isolated  episode of a blistering sunburn noted as a teenager.    Patient is otherwise feeling well, without additional skin concerns.    Labs Reviewed:  N/A    Physical Exam:  Vitals: There were no vitals taken for this visit.  SKIN: Full body skin exam excluding the genitals was performed including face, scalp, neck, ears, chest, back, bilateral arms, hands, bilateral legs, feet, and buttocks.   - On the left neck, there is a solitary pink macule with a few small areas of pigment noted on dermoscopy.  - There are dome shaped bright red papules on the trunk.   - There is a skin colored fleshy papule(s) located on the scalp, L chest, and upper back.  - Multiple regular brown pigmented macules and papules are identified on the arms, legs, and trunk.   - There are waxy stuck on tan to brown papules on the arms, legs, and trunk.   - Scars on forehead, L shoulder, and nose appear well healed, without evidence of recurrence.  - No other lesions of concern on areas examined.     Medications:  Current Outpatient Medications   Medication     ARTIFICIAL TEAR OP     fluocinonide (LIDEX) 0.05 % solution     ibuprofen (ADVIL,MOTRIN) 200 MG tablet     Lemborexant (DAYVIGO) 5 MG TABS     MELATONIN PO     niacinamide 500 MG tablet     zaleplon (SONATA) 5 MG capsule     zolpidem (AMBIEN) 5 MG tablet     No current facility-administered medications for this visit.     Facility-Administered Medications Ordered in Other Visits   Medication     May continue current IV fluid if patient has IV fluids infusing until discharge.     ondansetron (ZOFRAN-ODT) ODT tab 4 mg    Or     ondansetron (ZOFRAN) injection 4 mg     sodium chloride (PF) 0.9% PF flush 3 mL      Past Medical History:   Patient Active Problem List   Diagnosis     Psoriasis     Neoplasm of uncertain behavior of skin     AK (actinic keratosis)     BCC (basal cell carcinoma), trunk     Actinic keratosis     History of skin cancer     Diverticulitis     History of basal cell cancer      Dermal nevus     Dermatitis, seborrheic     Primary insomnia     Past Medical History:   Diagnosis Date     Arthritis      Autoimmune disease (H)      Basal cell carcinoma      Chronic tonsillitis      Hoarseness      Skin cancer      CC Jolene Diallo MD   DERMATOLOGY  [address] [phone] on close of this encounter.

## 2022-08-30 NOTE — NURSING NOTE
Dermatology Rooming Note    Eric Espinosa's goals for this visit include:   Chief Complaint   Patient presents with     Skin Check     Spot on neck of concern     Willow Brooks, CMA

## 2022-08-30 NOTE — NURSING NOTE
Lidocaine-epinephrine 1-1:666132 % injection   1.5mL once for one use, starting 8/30/2022 ending 8/30/2022,  2mL disp, R-0, injection  Injected by Zully

## 2022-08-30 NOTE — LETTER
8/30/2022       RE: Eric Espinosa  [address] [place] [address]     Dear Colleague,    Thank you for referring your patient, Eric Espinosa, to the [facility] DERMATOLOGY CLINIC [place] at [facility]. Please see a copy of my visit note below.    [facility] Dermatology Note  Encounter Date: Aug 30, 2022  Office Visit     Dermatology Problem List:  Last FBSE: 8/30/22  1. History of NMSC  -current tx: niacinamide 500 mg BID for chemoprevention  -BCC, nose, s/p Mohs surgery 2015   -BCC, L superior shoulder ([facility] 2013)  -BCC, forehead (2012 w/ Dr. Fernández in [place], [place]).   2. Actinic keratoses, forehead s/p cryo 1/27/22  3. Hemangioma L shoulder s/p excision 4/26/2016 (suspected lymph node)  4. Benign Bx  - IDN, central forehead, s/p shave bx 11/1/19  5 Seborrheic dermatitis  - patch on left temporal scalp with predominant scale  - Current tx: Alternate Selsan blue and head & shoulders for shampooing, lidex solution as needed (discussed 10/19/18)  6. Balanitis  - Current Tx: hydrocortisone 2.5% ointment BID PRN    # Neoplasm of uncertain behavior, L neck  - s/p shave bx 8/30/22  ____________________________________________    Assessment & Plan:    # Neoplasm of uncertain behavior, L neck - ddx scar v BCC  Discussed options for management, including shave biopsy versus observation with photos. Patient elected to proceed with shave biopsy today.  - Shave biopsy, see procedure note below    # Benign lesions - SKs, cherry angiomas, lentigenes.  - No treatment required    # Multiple benign nevi.   - No concerning lesions today  - Monitor for ABCDEs of melanoma   - Continue sun protection - recommend SPF 30 or higher with frequent application   - Return sooner if noticing changing or symptomatic lesions    # History of NMSC. No evidence of recurrent disease.  - Continue photoprotection - recommend SPF 30 or higher with  frequent reapplication  - Continue yearly skin exams  - Advised to monitor for changing, non-healing, bleeding, painful, changing, or otherwise symptomatic lesions  - Continue Niacinamide 500 mg BID for chemoprevention      Procedures Performed:   - Shave biopsy procedure note, location(s): L neck. After discussion of benefits and risks including but not limited to bleeding, infection, scar, incomplete removal, recurrence, and non-diagnostic biopsy, written consent and photographs were obtained. The area was cleaned with isopropyl alcohol. 0.5mL of 1% lidocaine with epinephrine was injected to obtain adequate anesthesia of lesion(s). Shave biopsy at site(s) performed. Hemostasis was achieved with aluminium chloride. Petrolatum ointment and a sterile dressing were applied. The patient tolerated the procedure and no complications were noted. The patient was provided with verbal and written post care instructions.     Follow-up: 6-12 months pending path results    Staff and Resident:     Zandra Puga MD  PGY2 Dermatology Resident    Staff Physician Comments:   I saw and evaluated the patient with the resident and I agree with the assessment and plan.  I was present for the key portions of the above major procedure and examination.    Jolene Diallo MD    Department of Dermatology  [facility]: Phone: [phone], Fax: [phone]  9/1/2022     ____________________________________________    CC: Skin Check (Spot on neck of concern)    HPI:  Mr. Eric Espinosa is a(n) 67 year old male who presents today as a return patient for a FBSE. Last seen by Dr. Lin on 1/27/22, at which time patient underwent cryotherapy for treatment of AK on the forehead and ISKs on the left hand and shoulder.     Eric reports having a spot on the left side of his neck, present for 3 months duration which he states initially appeared like a  pimple, that he wanted further evaluated today. Approximately two weeks ago, the spot appeared to self-resolve and fell off of his skin. Eric has been taking niacinamide 500 mg BID given his history of NMSC. He denies having a familial history of skin cancer. Wears sunscreen intermittently when outside. He reports having an isolated episode of a blistering sunburn noted as a teenager.    Patient is otherwise feeling well, without additional skin concerns.    Labs Reviewed:  N/A    Physical Exam:  Vitals: There were no vitals taken for this visit.  SKIN: Full body skin exam excluding the genitals was performed including face, scalp, neck, ears, chest, back, bilateral arms, hands, bilateral legs, feet, and buttocks.   - On the left neck, there is a solitary pink macule with a few small areas of pigment noted on dermoscopy.  - There are dome shaped bright red papules on the trunk.   - There is a skin colored fleshy papule(s) located on the scalp, L chest, and upper back.  - Multiple regular brown pigmented macules and papules are identified on the arms, legs, and trunk.   - There are waxy stuck on tan to brown papules on the arms, legs, and trunk.   - Scars on forehead, L shoulder, and nose appear well healed, without evidence of recurrence.  - No other lesions of concern on areas examined.     Medications:  Current Outpatient Medications   Medication     ARTIFICIAL TEAR OP     fluocinonide (LIDEX) 0.05 % solution     ibuprofen (ADVIL,MOTRIN) 200 MG tablet     Lemborexant (DAYVIGO) 5 MG TABS     MELATONIN PO     niacinamide 500 MG tablet     zaleplon (SONATA) 5 MG capsule     zolpidem (AMBIEN) 5 MG tablet     No current facility-administered medications for this visit.     Facility-Administered Medications Ordered in Other Visits   Medication     May continue current IV fluid if patient has IV fluids infusing until discharge.     ondansetron (ZOFRAN-ODT) ODT tab 4 mg    Or     ondansetron (ZOFRAN) injection 4 mg      sodium chloride (PF) 0.9% PF flush 3 mL      Past Medical History:   Patient Active Problem List   Diagnosis     Psoriasis     Neoplasm of uncertain behavior of skin     AK (actinic keratosis)     BCC (basal cell carcinoma), trunk     Actinic keratosis     History of skin cancer     Diverticulitis     History of basal cell cancer     Dermal nevus     Dermatitis, seborrheic     Primary insomnia     Past Medical History:   Diagnosis Date     Arthritis      Autoimmune disease (H)      Basal cell carcinoma      Chronic tonsillitis      Hoarseness      Skin cancer      CC Jolene Diallo MD   DERMATOLOGY  [address] [place], [place] [id number] on close of this encounter.

## 2022-08-30 NOTE — PATIENT INSTRUCTIONS
Wound Care After a Biopsy    What is a skin biopsy?  A skin biopsy allows the doctor to examine a very small piece of tissue under the microscope to determine the diagnosis and the best treatment for the skin condition. A local anesthetic (numbing medicine)  is injected with a very small needle into the skin area to be tested. A small piece of skin is taken from the area. Sometimes a suture (stitch) is used.     What are the risks of a skin biopsy?  I will experience scar, bleeding, swelling, pain, crusting and redness. I may experience incomplete removal or recurrence. Risks of this procedure are excessive bleeding, bruising, infection, nerve damage, numbness, thick (hypertrophic or keloidal) scar and non-diagnostic biopsy.    How should I care for my wound for the first 24 hours?  Keep the wound dry and covered for 24 hours  If it bleeds, hold direct pressure on the area for 15 minutes. If bleeding does not stop then go to the emergency room  Avoid strenuous exercise the first 1-2 days or as your doctor instructs you    How should I care for the wound after 24 hours?  After 24 hours, remove the bandage  You may bathe or shower as normal  If you had a scalp biopsy, you can shampoo as usual and can use shower water to clean the biopsy site daily  Clean the wound twice a day with gentle soap and water  Do not scrub, be gentle  Apply white petroleum/Vaseline after cleaning the wound with a cotton swab or a clean finger, and keep the site covered with a Bandaid /bandage. Bandages are not necessary with a scalp biopsy  If you are unable to cover the site with a Bandaid /bandage, re-apply ointment 2-3 times a day to keep the site moist. Moisture will help with healing  Avoid strenuous activity for first 1-2 days  Avoid lakes, rivers, pools, and oceans until the stitches are removed or the site is healed    How do I clean my wound?  Wash hands thoroughly with soap or use hand  before all wound care  Clean the  wound with gentle soap and water  Apply white petroleum/Vaseline  to wound after it is clean  Replace the Bandaid /bandage to keep the wound covered for the first few days or as instructed by your doctor  If you had a scalp biopsy, warm shower water to the area on a daily basis should suffice    What should I use to clean my wound?   Cotton-tipped applicators (Qtips )  White petroleum jelly (Vaseline ). Use a clean new container and use Q-tips to apply.  Bandaids   as needed  Gentle soap     How should I care for my wound long term?  Do not get your wound dirty  Keep up with wound care for one week or until the area is healed.  A small scab will form and fall off by itself when the area is completely healed. The area will be red and will become pink in color as it heals. Sun protection is very important for how your scar will turn out. Sunscreen with an SPF 30 or greater is recommended once the area is healed.  You should have some soreness but it should be mild and slowly go away over several days. Talk to your doctor about using tylenol for pain,    When should I call my doctor?  If you have increased:   Pain or swelling  Pus or drainage (clear or slightly yellow drainage is ok)  Temperature over 100F  Spreading redness or warmth around wound    When will I hear about my results?  The biopsy results can take 2 weeks to come back.  Your results will automatically release to [employer] before your provider has even reviewed them.  The clinic will call you with the results, send you a evly message, or have you schedule a follow-up clinic or phone time to discuss the results.  Contact our clinics if you do not hear from us in 2 weeks.    Who should I call with questions?  [facility]: [phone]  [facility]: [phone]  For urgent needs outside of business hours call the [facility] at [phone] and ask for the dermatology resident on call      Patient Education     Checking for Skin Cancer  You can find cancer early by checking your skin each month. There are 3 kinds of skin cancer. They are melanoma, basal cell carcinoma, and squamous cell carcinoma. Doing monthly skin checks is the best way to find new marks or skin changes. Follow the instructions below for checking your skin.   The ABCDEs of checking moles for melanoma   Check your moles or growths for signs of melanoma using ABCDE:   Asymmetry: the sides of the mole or growth don t match  Border: the edges are ragged, notched, or blurred  Color: the color within the mole or growth varies  Diameter: the mole or growth is larger than 6 mm (size of a pencil eraser)  Evolving: the size, shape, or color of the mole or growth is changing (evolving is not shown in the images below)    Checking for other types of skin cancer  Basal cell carcinoma or squamous cell carcinoma have symptoms such as:     A spot or mole that looks different from all other marks on your skin  Changes in how an area feels, such as itching, tenderness, or pain  Changes in the skin's surface, such as oozing, bleeding, or scaliness  A sore that does not heal  New swelling or redness beyond the border of a mole    Who s at risk?  Anyone can get skin cancer. But you are at greater risk if you have:   Fair skin, light-colored hair, or light-colored eyes  Many moles or abnormal moles on your skin  A history of sunburns from sunlight or tanning beds  A family history of skin cancer  A history of exposure to radiation or chemicals  A weakened immune system  If you have had skin cancer in the past, you are at risk for recurring skin cancer.   How to check your skin  Do your monthly skin checkups in front of a full-length mirror. Check all parts of your body, including your:   Head (ears, face, neck, and scalp)  Torso (front, back, and sides)  Arms (tops, undersides, upper, and lower armpits)  Hands (palms, backs, and fingers, including  under the nails)  Buttocks and genitals  Legs (front, back, and sides)  Feet (tops, soles, toes, including under the nails, and between toes)  If you have a lot of moles, take digital photos of them each month. Make sure to take photos both up close and from a distance. These can help you see if any moles change over time.   Most skin changes are not cancer. But if you see any changes in your skin, call your doctor right away. Only he or she can diagnose a problem. If you have skin cancer, seeing your doctor can be the first step toward getting the treatment that could save your life.   [employer] last reviewed this educational content on 4/1/2019 2000-2020 [employer]. [address] [place] [address], [place] [address]. All rights reserved. This information is not intended as a substitute for professional medical care. Always follow your healthcare professional's instructions.       When should I call my doctor?  If you are worsening or not improving, please, contact us or seek urgent care as noted below.     Who should I call with questions (adults)?  [facility] (adult and pediatric): [phone]  [facility] (adult): [phone]  For urgent needs outside of business hours call the [facility] at [phone] and ask for the dermatology resident on call to be paged  If this is a medical emergency and you are unable to reach an ER, Call [phone]    Who should I call with questions (pediatric)?  [facility]- Pediatric Dermatology  Dr. Nannette Zhang, Dr. Macie Richards, Dr. Suzanna Rojo, GALI Muniz, Dr. Merlyn Jackson, Dr. Lexus Lyn & Dr. Lanre Waldron  Non-urgent nurse triage line; [phone]- Jayda and Cecille VALERIO Care Coordinatormarcelino Lopez (/Complex ) [phone]    If you need a prescription refill, please contact your pharmacy. Refills are approved or denied by our  Physicians during normal business hours, Monday through Fridays  Per office policy, refills will not be granted if you have not been seen within the past year (or sooner depending on your child's condition)    Scheduling Information:  Pediatric Appointment Scheduling and Call Center [phone]  Radiology Scheduling- [phone]  Sedation Unit Scheduling- [phone]  [place] Scheduling- General [phone]; Pediatric Dermatology [phone]  Main  Services: [phone]  Indonesian: [phone]  Mexican: [phone]  Hmong/Martiniquais/Greek: [phone]  Preadmission Nursing Department Fax Number: [phone] (Fax all pre-operative paperwork to this number)    For urgent matters arising during evenings, weekends, or holidays that cannot wait for normal business hours please call [phone] and ask for the dermatology resident on call to be paged.

## 2022-09-03 ENCOUNTER — HEALTH MAINTENANCE LETTER (OUTPATIENT)
Age: 67
End: 2022-09-03

## 2022-09-03 LAB
PATH REPORT.COMMENTS IMP SPEC: NORMAL
PATH REPORT.COMMENTS IMP SPEC: NORMAL
PATH REPORT.FINAL DX SPEC: NORMAL
PATH REPORT.GROSS SPEC: NORMAL
PATH REPORT.MICROSCOPIC SPEC OTHER STN: NORMAL
PATH REPORT.RELEVANT HX SPEC: NORMAL

## 2022-09-08 ENCOUNTER — MYC MEDICAL ADVICE (OUTPATIENT)
Dept: INTERNAL MEDICINE | Facility: CLINIC | Age: 67
End: 2022-09-08

## 2022-09-13 ENCOUNTER — OFFICE VISIT (OUTPATIENT)
Dept: INTERNAL MEDICINE | Facility: CLINIC | Age: 67
End: 2022-09-13
Payer: COMMERCIAL

## 2022-09-13 VITALS
SYSTOLIC BLOOD PRESSURE: 143 MMHG | WEIGHT: 169 LBS | HEART RATE: 73 BPM | DIASTOLIC BLOOD PRESSURE: 84 MMHG | OXYGEN SATURATION: 97 % | BODY MASS INDEX: 24.2 KG/M2 | HEIGHT: 70 IN

## 2022-09-13 DIAGNOSIS — R04.0 EPISTAXIS: Primary | ICD-10-CM

## 2022-09-13 PROCEDURE — 99213 OFFICE O/P EST LOW 20 MIN: CPT | Performed by: NURSE PRACTITIONER

## 2022-09-13 NOTE — NURSING NOTE
Eric Espinosa is a 67 year old male that presents in clinic today for the following:     Chief Complaint   Patient presents with     Nose Problem     Several nose bleeds over the past few days per patient       The patient's allergies and medications were reviewed. The patient's vitals were obtained without incident. The patient does not have any other questions for the provider.     Te Flores, EMT at 8:48 AM on 9/13/2022.  Primary Care Clinic: [phone]

## 2022-09-13 NOTE — PROGRESS NOTES
S:Eric Espinosa is a 67 year old male here for nosebleeds.  He experienced 8 episodes of epistaxis over five days.  One episode was while sleeping. His most recent episode was 9/8/22. He is able to stop the bleeding himself with compression.  He has a hx of nasal septal deviation repair 20 years ago.          Patient Active Problem List   Diagnosis     Psoriasis     Neoplasm of uncertain behavior of skin     AK (actinic keratosis)     BCC (basal cell carcinoma), trunk     Actinic keratosis     History of skin cancer     Diverticulitis     History of basal cell cancer     Dermal nevus     Dermatitis, seborrheic     Primary insomnia            Past Medical History:   Diagnosis Date     Arthritis      Autoimmune disease (H)      Basal cell carcinoma      Chronic tonsillitis      Hoarseness      Skin cancer             Past Surgical History:   Procedure Laterality Date     ENT SURGERY  1993    fix deviated septum, scrape turbinates     MOHS MICROGRAPHIC PROCEDURE              Social History     Tobacco Use     Smoking status: Never Smoker     Smokeless tobacco: Never Used   Substance Use Topics     Alcohol use: Yes            Family History   Problem Relation Age of Onset     Diabetes Father      Heart Disease Father      Hypertension Mother      Cancer No family hx of         No family history of skin cancer     Skin Cancer No family hx of      Glaucoma No family hx of      Macular Degeneration No family hx of                Allergies   Allergen Reactions     Ciprofloxacin Rash            Current Outpatient Medications   Medication Sig Dispense Refill     ARTIFICIAL TEAR OP Apply 1 drop to eye as needed       fluocinonide (LIDEX) 0.05 % solution Apply topically 2 times daily 60 mL 1     ibuprofen (ADVIL,MOTRIN) 200 MG tablet Take 200 mg by mouth every 4 hours as needed       Lemborexant (DAYVIGO) 5 MG TABS Take 1 tablet by mouth At Bedtime 30 tablet 5     MELATONIN PO Take 5 mg by mouth At Bedtime        niacinamide  500 MG tablet Take 1 tablet (500 mg) by mouth 2 times daily (with meals) 180 tablet 3     zaleplon (SONATA) 5 MG capsule Take 1 capsule (5 mg) by mouth nightly as needed for sleep 30 capsule 5     zolpidem (AMBIEN) 5 MG tablet Take 1 tablet (5 mg) by mouth nightly as needed for sleep 30 tablet 1       REVIEW OF SYSTEMS:  See above.    O:   There were no vitals taken for this visit.  GENERAL APPEARANCE: healthy, alert and no distress  HENT:right nare inspection shows no crusting of evidence of recent bleed.  RESP:no distress  CV: see VS  ABDOMEN:  soft, nontender, no HSM or masses and bowel sounds normal  NEURO:normal.  PSYCHE: normal      A/P:  Eric was seen today for nose problem.    Diagnoses and all orders for this visit:    Epistaxis  -     Adult ENT  Referral    Advised to insert into nare oprning s trace amount of lubricant.    The patient voiced understanding of the information discussed and all questions were answered.     I spent a total of  20  minutes in the care of this pt during today's office visit. This time includes reviewing the patient's chart and prior history, obtaining a history, performing an examination and evaluation and counseling the patient. This time also includes ordering medications or tests necessary in addition to communication to other member's of the patient's health care team. Time spent in documentation and care coordination is included.     Judy LEES, CNP

## 2022-09-14 ENCOUNTER — OFFICE VISIT (OUTPATIENT)
Dept: OTOLARYNGOLOGY | Facility: CLINIC | Age: 67
End: 2022-09-14
Attending: NURSE PRACTITIONER
Payer: COMMERCIAL

## 2022-09-14 VITALS — DIASTOLIC BLOOD PRESSURE: 83 MMHG | HEART RATE: 94 BPM | SYSTOLIC BLOOD PRESSURE: 152 MMHG

## 2022-09-14 DIAGNOSIS — R04.0 EPISTAXIS: Primary | ICD-10-CM

## 2022-09-14 PROCEDURE — 30901 CONTROL OF NOSEBLEED: CPT | Mod: RT | Performed by: OTOLARYNGOLOGY

## 2022-09-14 PROCEDURE — 99213 OFFICE O/P EST LOW 20 MIN: CPT | Mod: 25 | Performed by: OTOLARYNGOLOGY

## 2022-09-14 ASSESSMENT — ENCOUNTER SYMPTOMS
DOUBLE VISION: 0
BLURRED VISION: 0
SPUTUM PRODUCTION: 0
VOMITING: 0
HEARTBURN: 0
COUGH: 0
PHOTOPHOBIA: 0
SINUS PAIN: 0
TINGLING: 0
HEMOPTYSIS: 0
CONSTITUTIONAL NEGATIVE: 1
BRUISES/BLEEDS EASILY: 0
TREMORS: 0
STRIDOR: 0
HEADACHES: 0
DIZZINESS: 0
NAUSEA: 0
SORE THROAT: 0

## 2022-09-14 NOTE — LETTER
9/14/2022         RE: Eric Espinosa  [address]   [facility] [address]        Dear Colleague,    Thank you for referring your patient, Eric Espinosa, to the [facility]. Please see a copy of my visit note below.    Chief Complaint   Patient presents with     Consult     Epistaxis. ABout 2 weeks ago had up to 3 bloody noses in a day, last one was 7 days ago. Lasts about 5 mins and always on the right side.          HPI    This pleasant patient is having recurrent nose bleeds for the past 2 weeks. Occurred in the right nostril. He has a hx of frequent epistaxis 27 years ago and had septoplasty to stop those. Denies any easy bleeding, bruising, trauma, facial pressure or nasal congestion. He has a hx of basal cell ca removals in his face.    PMH  Psoriasis    Neoplasm of uncertain behavior of skin    AK (actinic keratosis)    BCC (basal cell carcinoma), trunk    Actinic keratosis    History of skin cancer    Diverticulitis    History of basal cell cancer    Dermal nevus    Dermatitis, seborrheic    Primary insomnia    Outpatient Medications    ARTIFICIAL TEAR OP    fluocinonide (LIDEX) 0.05 % solution    ibuprofen (ADVIL,MOTRIN) 200 MG tablet    Lemborexant (DAYVIGO) 5 MG TABS    MELATONIN PO    niacinamide 500 MG tablet    zaleplon (SONATA) 5 MG capsule    zolpidem (AMBIEN) 5 MG tablet       Review of Systems   Constitutional: Negative.    HENT: Positive for nosebleeds. Negative for congestion, ear discharge, ear pain, hearing loss, sinus pain, sore throat and tinnitus.    Eyes: Negative for blurred vision, double vision and photophobia.   Respiratory: Negative for cough, hemoptysis, sputum production and stridor.    Gastrointestinal: Negative for heartburn, nausea and vomiting.   Skin: Negative.    Neurological: Negative for dizziness, tingling, tremors and headaches.   Endo/Heme/Allergies: Negative for environmental allergies. Does not bruise/bleed easily.         Physical  Exam  Vitals reviewed.   Constitutional:       Appearance: Normal appearance.   HENT:      Head: Normocephalic and atraumatic.      Right Ear: Tympanic membrane, ear canal and external ear normal. No middle ear effusion. There is no impacted cerumen.      Left Ear: Tympanic membrane, ear canal and external ear normal.  No middle ear effusion. There is no impacted cerumen.      Nose: Nose normal. No septal deviation or mucosal edema.      Right Turbinates: Not swollen.      Left Turbinates: Not swollen.      Mouth/Throat:      Mouth: Mucous membranes are moist.      Pharynx: Oropharynx is clear. Uvula midline.   Eyes:      Extraocular Movements: Extraocular movements intact.      Pupils: Pupils are equal, round, and reactive to light.   Neurological:      Mental Status: He is alert.       Right Kiesselbach region was seen under the microscope. Two prominent vessels were observed. 2% Lidocaine topical was applied for 10 minutes and then cauterized with silver nitrate. He tolerated the procedure well.    A/P  Eric is having recurrent epistaxis from the right nostril. Right Kiesselbach region was seen under the microscope. Two prominent vessels were observed and cauterized with silver nitrate. F/u as needed.        Again, thank you for allowing me to participate in the care of your patient.        Sincerely,        Javi Hopper MD

## 2022-09-14 NOTE — NURSING NOTE
Eric Espinosa's chief complaint for this visit includes:  Chief Complaint   Patient presents with     Consult     Epistaxis. ABout 2 weeks ago had up to 3 bloody noses in a day, last one was 7 days ago. Lasts about 5 mins and always on the right side.      PCP: Lanre Garland    Referring Provider:  Lanre Garland MD  [address] [address]    BP (!) 152/83   Pulse 94   Data Unavailable        Allergies   Allergen Reactions     Ciprofloxacin Rash         Do you need any medication refills at today's visit?

## 2022-09-14 NOTE — PROGRESS NOTES
HPI    This pleasant patient is having recurrent nose bleeds for the past 2 weeks. Occurred in the right nostril. He has a hx of frequent epistaxis 27 years ago and had septoplasty to stop those. Denies any easy bleeding, bruising, trauma, facial pressure or nasal congestion. He has a hx of basal cell ca removals in his face.    PMH  Psoriasis    Neoplasm of uncertain behavior of skin    AK (actinic keratosis)    BCC (basal cell carcinoma), trunk    Actinic keratosis    History of skin cancer    Diverticulitis    History of basal cell cancer    Dermal nevus    Dermatitis, seborrheic    Primary insomnia    Outpatient Medications    ARTIFICIAL TEAR OP    fluocinonide (LIDEX) 0.05 % solution    ibuprofen (ADVIL,MOTRIN) 200 MG tablet    Lemborexant (DAYVIGO) 5 MG TABS    MELATONIN PO    niacinamide 500 MG tablet    zaleplon (SONATA) 5 MG capsule    zolpidem (AMBIEN) 5 MG tablet       Review of Systems   Constitutional: Negative.    HENT: Positive for nosebleeds. Negative for congestion, ear discharge, ear pain, hearing loss, sinus pain, sore throat and tinnitus.    Eyes: Negative for blurred vision, double vision and photophobia.   Respiratory: Negative for cough, hemoptysis, sputum production and stridor.    Gastrointestinal: Negative for heartburn, nausea and vomiting.   Skin: Negative.    Neurological: Negative for dizziness, tingling, tremors and headaches.   Endo/Heme/Allergies: Negative for environmental allergies. Does not bruise/bleed easily.         Physical Exam  Vitals reviewed.   Constitutional:       Appearance: Normal appearance.   HENT:      Head: Normocephalic and atraumatic.      Right Ear: Tympanic membrane, ear canal and external ear normal. No middle ear effusion. There is no impacted cerumen.      Left Ear: Tympanic membrane, ear canal and external ear normal.  No middle ear effusion. There is no impacted cerumen.      Nose: Nose normal. No septal deviation or mucosal edema.      Right Turbinates:  Not swollen.      Left Turbinates: Not swollen.      Mouth/Throat:      Mouth: Mucous membranes are moist.      Pharynx: Oropharynx is clear. Uvula midline.   Eyes:      Extraocular Movements: Extraocular movements intact.      Pupils: Pupils are equal, round, and reactive to light.   Neurological:      Mental Status: He is alert.       Right Kiesselbach region was seen under the microscope. Two prominent vessels were observed. 2% Lidocaine topical was applied for 10 minutes and then cauterized with silver nitrate. He tolerated the procedure well.    A/P  Eric is having recurrent epistaxis from the right nostril. Right Kiesselbach region was seen under the microscope. Two prominent vessels were observed and cauterized with silver nitrate. F/u as needed.     No

## 2022-09-14 NOTE — PROGRESS NOTES
Chief Complaint   Patient presents with     Consult     Epistaxis. ABout 2 weeks ago had up to 3 bloody noses in a day, last one was 7 days ago. Lasts about 5 mins and always on the right side.

## 2022-09-30 ENCOUNTER — TELEPHONE (OUTPATIENT)
Dept: GASTROENTEROLOGY | Facility: CLINIC | Age: 67
End: 2022-09-30

## 2022-09-30 DIAGNOSIS — Z12.11 ENCOUNTER FOR SCREENING COLONOSCOPY: Primary | ICD-10-CM

## 2022-09-30 RX ORDER — BISACODYL 5 MG/1
TABLET, DELAYED RELEASE ORAL
Qty: 4 TABLET | Refills: 0 | Status: SHIPPED | OUTPATIENT
Start: 2022-09-30

## 2022-09-30 NOTE — TELEPHONE ENCOUNTER
Attempted to contact patient regarding upcoming colonoscopy procedure on 10.10.2022 for pre assessment questions. No answer.     Left message to return call to [phone] #4    Discuss at home rapid antigen COVID test 1-2 days prior to procedure.    Arrival time: 1345    Facility location: [facility]    Sedation type: CS    Indication for procedure: screening colonoscopy    Bowel prep recommendation: Golytely d/t mg citrate    Prep instructions sent via [email or web address].  Prep prescription sent to    [facility] #[id number] - [place], [place] - [address] AT [facility] OF MARGARITO Millan RN

## 2022-10-04 ENCOUNTER — OFFICE VISIT (OUTPATIENT)
Dept: OTOLARYNGOLOGY | Facility: CLINIC | Age: 67
End: 2022-10-04
Payer: COMMERCIAL

## 2022-10-04 VITALS — DIASTOLIC BLOOD PRESSURE: 73 MMHG | HEART RATE: 76 BPM | SYSTOLIC BLOOD PRESSURE: 119 MMHG

## 2022-10-04 DIAGNOSIS — R04.0 EPISTAXIS: Primary | ICD-10-CM

## 2022-10-04 PROCEDURE — 99214 OFFICE O/P EST MOD 30 MIN: CPT | Performed by: OTOLARYNGOLOGY

## 2022-10-04 NOTE — NURSING NOTE
Eric Espinosa's chief complaint for this visit includes:  Chief Complaint   Patient presents with     Follow Up     3 week check Epistaxis, had 3 bloody noses 1 to 3 days after last visit. 4 days     PCP: Lanre Garland    Referring Provider:  No referring provider defined for this encounter.    /73   Pulse 76   Data Unavailable        Allergies   Allergen Reactions     Ciprofloxacin Rash         Do you need any medication refills at today's visit?

## 2022-10-04 NOTE — LETTER
10/4/2022         RE: Eric Espinosa  [address]   [facility] [id number]        Dear Colleague,    Thank you for referring your patient, Eric Espinosa, to the [facility]. Please see a copy of my visit note below.    HPI    This pleasant patient is here for the f/u. I am glad to see that he did have some oozing from the right cauterized region right after the procedure but later no issues were seen. He has a hx of frequent epistaxis and had a hx of  septoplasty to stop those bleeding episodes. Denies any easy bleeding, bruising, trauma, facial pressure or nasal congestion. He has a hx of basal cell ca removals in his face.    PMH  Psoriasis    Neoplasm of uncertain behavior of skin    AK (actinic keratosis)    BCC (basal cell carcinoma), trunk    Actinic keratosis    History of skin cancer    Diverticulitis    History of basal cell cancer    Dermal nevus    Dermatitis, seborrheic    Primary insomnia    Outpatient Medications    ARTIFICIAL TEAR OP    fluocinonide (LIDEX) 0.05 % solution    ibuprofen (ADVIL,MOTRIN) 200 MG tablet    Lemborexant (DAYVIGO) 5 MG TABS    MELATONIN PO    niacinamide 500 MG tablet    zaleplon (SONATA) 5 MG capsule    zolpidem (AMBIEN) 5 MG tablet       Review of Systems   Constitutional: Negative.    HENT: Positive for nosebleeds. Negative for congestion, ear discharge, ear pain, hearing loss, sinus pain, sore throat and tinnitus.    Eyes: Negative for blurred vision, double vision and photophobia.   Respiratory: Negative for cough, hemoptysis, sputum production and stridor.    Gastrointestinal: Negative for heartburn, nausea and vomiting.   Skin: Negative.    Neurological: Negative for dizziness, tingling, tremors and headaches.   Endo/Heme/Allergies: Negative for environmental allergies. Does not bruise/bleed easily.       Physical Exam  Vitals reviewed.   Constitutional:       Appearance: Normal appearance.   HENT:      Head: Normocephalic and  atraumatic.      Right Ear: Tympanic membrane, ear canal and external ear normal. No middle ear effusion. There is no impacted cerumen.      Left Ear: Tympanic membrane, ear canal and external ear normal.  No middle ear effusion. There is no impacted cerumen.      Nose: Nose normal. No septal deviation or mucosal edema.      Right Turbinates: Not swollen.      Left Turbinates: Not swollen.      Mouth/Throat:      Mouth: Mucous membranes are moist.      Pharynx: Oropharynx is clear. Uvula midline.   Eyes:      Extraocular Movements: Extraocular movements intact.      Pupils: Pupils are equal, round, and reactive to light.   Neurological:      Mental Status: He is alert.       A/P  Eric is having recurrent epistaxis from the right nostril. Right Kiesselbach region was seen under the microscope. There was no active bleeding at this point. F/u as needed.        Again, thank you for allowing me to participate in the care of your patient.        Sincerely,        Javi Hopper MD

## 2022-10-04 NOTE — PROGRESS NOTES
HPI    This pleasant patient is here for the f/u. I am glad to see that he did have some oozing from the right cauterized region right after the procedure but later no issues were seen. He has a hx of frequent epistaxis and had a hx of  septoplasty to stop those bleeding episodes. Denies any easy bleeding, bruising, trauma, facial pressure or nasal congestion. He has a hx of basal cell ca removals in his face.    PMH  Psoriasis    Neoplasm of uncertain behavior of skin    AK (actinic keratosis)    BCC (basal cell carcinoma), trunk    Actinic keratosis    History of skin cancer    Diverticulitis    History of basal cell cancer    Dermal nevus    Dermatitis, seborrheic    Primary insomnia    Outpatient Medications    ARTIFICIAL TEAR OP    fluocinonide (LIDEX) 0.05 % solution    ibuprofen (ADVIL,MOTRIN) 200 MG tablet    Lemborexant (DAYVIGO) 5 MG TABS    MELATONIN PO    niacinamide 500 MG tablet    zaleplon (SONATA) 5 MG capsule    zolpidem (AMBIEN) 5 MG tablet       Review of Systems   Constitutional: Negative.    HENT: Positive for nosebleeds. Negative for congestion, ear discharge, ear pain, hearing loss, sinus pain, sore throat and tinnitus.    Eyes: Negative for blurred vision, double vision and photophobia.   Respiratory: Negative for cough, hemoptysis, sputum production and stridor.    Gastrointestinal: Negative for heartburn, nausea and vomiting.   Skin: Negative.    Neurological: Negative for dizziness, tingling, tremors and headaches.   Endo/Heme/Allergies: Negative for environmental allergies. Does not bruise/bleed easily.       Physical Exam  Vitals reviewed.   Constitutional:       Appearance: Normal appearance.   HENT:      Head: Normocephalic and atraumatic.      Right Ear: Tympanic membrane, ear canal and external ear normal. No middle ear effusion. There is no impacted cerumen.      Left Ear: Tympanic membrane, ear canal and external ear normal.  No middle ear effusion. There is no impacted cerumen.     TRANSFER - OUT REPORT: 
 
Verbal report given to Dante Pinto RN (name) on Kimberly Teran  being transferred to Phase II (unit) for routine progression of care Report consisted of patients Situation, Background, Assessment and  
Recommendations(SBAR). Information from the following report(s) SBAR, Kardex, OR Summary, Procedure Summary, Intake/Output and MAR was reviewed with the receiving nurse. Lines:  
Peripheral IV 01/15/19 Posterior;Right Hand (Active) Site Assessment Clean, dry, & intact 1/15/2019 11:08 AM  
Phlebitis Assessment 0 1/15/2019 11:08 AM  
Infiltration Assessment 0 1/15/2019 11:08 AM  
Dressing Status Clean, dry, & intact 1/15/2019 11:08 AM  
Dressing Type Transparent;Tape 1/15/2019 11:08 AM  
Hub Color/Line Status Infusing 1/15/2019 11:08 AM  
Action Taken Armboard 1/15/2019 10:49 AM  
Alcohol Cap Used No 1/15/2019  9:58 AM  
  
 
Opportunity for questions and clarification was provided. Patient transported with: 
 O2 @ 2 liters Registered Nurse   Nose: Nose normal. No septal deviation or mucosal edema.      Right Turbinates: Not swollen.      Left Turbinates: Not swollen.      Mouth/Throat:      Mouth: Mucous membranes are moist.      Pharynx: Oropharynx is clear. Uvula midline.   Eyes:      Extraocular Movements: Extraocular movements intact.      Pupils: Pupils are equal, round, and reactive to light.   Neurological:      Mental Status: He is alert.       A/P  Eric is having recurrent epistaxis from the right nostril. Right Kiesselbach region was seen under the microscope. There was no active bleeding at this point. F/u as needed.

## 2022-10-04 NOTE — TELEPHONE ENCOUNTER
Pre assessment questions completed for upcoming Colonoscopy procedure scheduled on 10/10/22    COVID policy reviewed. Patient to complete rapid antigen test one to two days before their scheduled procedure. Patient to bring photo of the results when they come in for their procedure.    Reviewed procedural arrival time 1345 and facility location [facility].    Designated  policy reviewed. Instructed to have someone stay 6 hours post procedure.     Reviewed Colonoscopy prep instructions. No fiber/iron supplements or foods that contain nuts/seeds 7 days prior to procedure.     Anticoagulation/blood thinners? None    Electronic implanted devices? None    Patient verbalized understanding and had no questions or concerns at this time.    Coco Jordan RN

## 2022-10-10 ENCOUNTER — HOSPITAL ENCOUNTER (OUTPATIENT)
Facility: AMBULATORY SURGERY CENTER | Age: 67
Discharge: HOME | End: 2022-10-10
Attending: INTERNAL MEDICINE | Admitting: INTERNAL MEDICINE
Payer: COMMERCIAL

## 2022-10-10 VITALS
RESPIRATION RATE: 12 BRPM | TEMPERATURE: 98 F | HEART RATE: 70 BPM | DIASTOLIC BLOOD PRESSURE: 83 MMHG | OXYGEN SATURATION: 100 % | SYSTOLIC BLOOD PRESSURE: 121 MMHG

## 2022-10-10 LAB — COLONOSCOPY: NORMAL

## 2022-10-10 PROCEDURE — 45380 COLONOSCOPY AND BIOPSY: CPT | Mod: 33

## 2022-10-10 PROCEDURE — 88305 TISSUE EXAM BY PATHOLOGIST: CPT | Mod: TC | Performed by: INTERNAL MEDICINE

## 2022-10-10 PROCEDURE — 88305 TISSUE EXAM BY PATHOLOGIST: CPT | Mod: 26 | Performed by: PATHOLOGY

## 2022-10-10 RX ORDER — NALOXONE HYDROCHLORIDE 0.4 MG/ML
0.2 INJECTION, SOLUTION INTRAMUSCULAR; INTRAVENOUS; SUBCUTANEOUS
Status: DISCONTINUED | OUTPATIENT
Start: 2022-10-10 | End: 2022-10-11 | Stop reason: HOSPADM

## 2022-10-10 RX ORDER — NALOXONE HYDROCHLORIDE 0.4 MG/ML
0.4 INJECTION, SOLUTION INTRAMUSCULAR; INTRAVENOUS; SUBCUTANEOUS
Status: DISCONTINUED | OUTPATIENT
Start: 2022-10-10 | End: 2022-10-11 | Stop reason: HOSPADM

## 2022-10-10 RX ORDER — ONDANSETRON 2 MG/ML
4 INJECTION INTRAMUSCULAR; INTRAVENOUS EVERY 6 HOURS PRN
Status: DISCONTINUED | OUTPATIENT
Start: 2022-10-10 | End: 2022-10-11 | Stop reason: HOSPADM

## 2022-10-10 RX ORDER — ONDANSETRON 4 MG/1
4 TABLET, ORALLY DISINTEGRATING ORAL EVERY 6 HOURS PRN
Status: DISCONTINUED | OUTPATIENT
Start: 2022-10-10 | End: 2022-10-11 | Stop reason: HOSPADM

## 2022-10-10 RX ORDER — FLUMAZENIL 0.1 MG/ML
0.2 INJECTION, SOLUTION INTRAVENOUS
Status: DISCONTINUED | OUTPATIENT
Start: 2022-10-10 | End: 2022-10-11 | Stop reason: HOSPADM

## 2022-10-10 RX ORDER — LIDOCAINE 40 MG/G
CREAM TOPICAL
Status: DISCONTINUED | OUTPATIENT
Start: 2022-10-10 | End: 2022-10-10 | Stop reason: HOSPADM

## 2022-10-10 RX ORDER — PROCHLORPERAZINE MALEATE 5 MG
5 TABLET ORAL EVERY 6 HOURS PRN
Status: DISCONTINUED | OUTPATIENT
Start: 2022-10-10 | End: 2022-10-11 | Stop reason: HOSPADM

## 2022-10-10 RX ORDER — ONDANSETRON 2 MG/ML
4 INJECTION INTRAMUSCULAR; INTRAVENOUS
Status: DISCONTINUED | OUTPATIENT
Start: 2022-10-10 | End: 2022-10-10 | Stop reason: HOSPADM

## 2022-10-10 NOTE — H&P
Eric Espinosa  [id number]  male  67 year old      Reason for procedure/surgery: surveillance    Patient Active Problem List   Diagnosis     Psoriasis     Neoplasm of uncertain behavior of skin     AK (actinic keratosis)     BCC (basal cell carcinoma), trunk     Actinic keratosis     History of skin cancer     Diverticulitis     History of basal cell cancer     Dermal nevus     Dermatitis, seborrheic     Primary insomnia       Past Surgical History:    Past Surgical History:   Procedure Laterality Date     ENT SURGERY  1993    fix deviated septum, scrape turbinates     MOHS MICROGRAPHIC PROCEDURE         Past Medical History:   Past Medical History:   Diagnosis Date     Arthritis      Autoimmune disease (H)      Basal cell carcinoma      Chronic tonsillitis      Hoarseness      Skin cancer        Social History:   Social History     Tobacco Use     Smoking status: Never     Smokeless tobacco: Never   Substance Use Topics     Alcohol use: Yes       Family History:   Family History   Problem Relation Age of Onset     Diabetes Father      Heart Disease Father      Hypertension Mother      Cancer No family hx of         No family history of skin cancer     Skin Cancer No family hx of      Glaucoma No family hx of      Macular Degeneration No family hx of        Allergies:   Allergies   Allergen Reactions     Ciprofloxacin Rash       Active Medications:   Current Outpatient Medications   Medication Sig Dispense Refill     ARTIFICIAL TEAR OP Apply 1 drop to eye as needed       bisacodyl (DULCOLAX) 5 MG EC tablet Take as directed. One day before exam take 2 tablets at 3 PM. Day of exam take 2 tablets at 6 AM. 4 tablet 0     fluocinonide (LIDEX) 0.05 % solution Apply topically 2 times daily 60 mL 1     ibuprofen (ADVIL,MOTRIN) 200 MG tablet Take 200 mg by mouth every 4 hours as needed       Lemborexant (DAYVIGO) 5 MG TABS Take 1 tablet by mouth At Bedtime 30 tablet 5     MELATONIN PO Take 5 mg by mouth At Bedtime         niacinamide 500 MG tablet Take 1 tablet (500 mg) by mouth 2 times daily (with meals) 180 tablet 3     polyethylene glycol (GOLYTELY) 236 g suspension Take as directed. One day before exam fill the jug with water. Cover and shake until well mixed. At 6 PM start drinking an 8oz glass of mixture every 15 minutes until jug is 1/2 empty. Store remainder in the refrigerator. Day of exam at 6 AM Drink the other half of the Golytely jug. Drink one 8-ounce glass every 15 minutes until the jug is empty. You should finish the prep 4 hours before the exam. 4000 mL 0     zaleplon (SONATA) 5 MG capsule Take 1 capsule (5 mg) by mouth nightly as needed for sleep 30 capsule 5     zolpidem (AMBIEN) 5 MG tablet Take 1 tablet (5 mg) by mouth nightly as needed for sleep 30 tablet 1       Systemic Review:   CONSTITUTIONAL: NEGATIVE for fever, chills, change in weight  ENT/MOUTH: NEGATIVE for ear, mouth and throat problems  RESP: NEGATIVE for significant cough or SOB  CV: NEGATIVE for chest pain, palpitations or peripheral edema    Physical Examination:   Vital Signs: /79 (Cuff Size: Adult Regular)   Pulse 90   Temp 98.8  F (37.1  C) (Temporal)   Resp 16   SpO2 95%   GENERAL: healthy, alert and no distress  NECK: no adenopathy, no asymmetry, masses, or scars  RESP: lungs clear to auscultation - no rales, rhonchi or wheezes  CV: regular rate and rhythm, normal S1 S2, no S3 or S4, no murmur, click or rub, no peripheral edema and peripheral pulses strong  ABDOMEN: soft, nontender, no hepatosplenomegaly, no masses and bowel sounds normal  MS: no gross musculoskeletal defects noted, no edema    Plan: Appropriate to proceed as scheduled.      Lizbeth Blood MD  10/10/2022    PCP:  Lanre Garland

## 2022-10-11 ENCOUNTER — ALLIED HEALTH/NURSE VISIT (OUTPATIENT)
Dept: FAMILY MEDICINE | Facility: CLINIC | Age: 67
End: 2022-10-11
Payer: COMMERCIAL

## 2022-10-11 DIAGNOSIS — Z23 NEED FOR VACCINATION: Primary | ICD-10-CM

## 2022-10-11 PROCEDURE — 90471 IMMUNIZATION ADMIN: CPT

## 2022-10-11 PROCEDURE — 99207 PR NO CHARGE NURSE ONLY: CPT

## 2022-10-11 PROCEDURE — 90677 PCV20 VACCINE IM: CPT

## 2022-10-11 PROCEDURE — 90472 IMMUNIZATION ADMIN EACH ADD: CPT

## 2022-10-11 PROCEDURE — 90715 TDAP VACCINE 7 YRS/> IM: CPT

## 2022-10-13 LAB
PATH REPORT.COMMENTS IMP SPEC: NORMAL
PATH REPORT.COMMENTS IMP SPEC: NORMAL
PATH REPORT.FINAL DX SPEC: NORMAL
PATH REPORT.GROSS SPEC: NORMAL
PATH REPORT.MICROSCOPIC SPEC OTHER STN: NORMAL
PATH REPORT.RELEVANT HX SPEC: NORMAL
PHOTO IMAGE: NORMAL

## 2022-12-13 ENCOUNTER — PATIENT OUTREACH (OUTPATIENT)
Dept: DERMATOLOGY | Facility: CLINIC | Age: 67
End: 2022-12-13

## 2022-12-13 NOTE — TELEPHONE ENCOUNTER
Attempted to reach patient to schedule follow up in the Dermatology Clinic.  No answer,  LM on VM to call office and Divided message sent..    Schedule with Dr. Jolene Diallo 8/2023.

## 2022-12-27 ENCOUNTER — MYC REFILL (OUTPATIENT)
Dept: PULMONOLOGY | Facility: OTHER | Age: 67
End: 2022-12-27

## 2022-12-27 DIAGNOSIS — F51.01 PRIMARY INSOMNIA: ICD-10-CM

## 2022-12-27 RX ORDER — LEMBOREXANT 5 MG/1
1 TABLET, FILM COATED ORAL AT BEDTIME
Qty: 30 TABLET | Refills: 5 | Status: SHIPPED | OUTPATIENT
Start: 2022-12-27

## 2022-12-29 ENCOUNTER — TELEPHONE (OUTPATIENT)
Dept: PULMONOLOGY | Facility: OTHER | Age: 67
End: 2022-12-29

## 2023-01-03 NOTE — TELEPHONE ENCOUNTER
Received an APPROVAL from [employer] for DayVigo 5mg tablets. Effective 12/31/2022 to 12/31/2023. Forms scanned to [facility].

## 2023-01-11 ENCOUNTER — MYC MEDICAL ADVICE (OUTPATIENT)
Dept: INTERNAL MEDICINE | Facility: CLINIC | Age: 68
End: 2023-01-11

## 2023-01-11 DIAGNOSIS — G47.9 SLEEP DISORDER: ICD-10-CM

## 2023-01-11 RX ORDER — ZOLPIDEM TARTRATE 5 MG/1
5 TABLET ORAL
Qty: 30 TABLET | Refills: 1 | Status: SHIPPED | OUTPATIENT
Start: 2023-01-11

## 2023-01-11 NOTE — TELEPHONE ENCOUNTER
Pt last had ambien filled 5mg 30 tablets with 1 refill for a 60 day supply, last filled 6/24/2021. Per patient, uses sparingly. (Please see MyChart message from patient). Pt had to reschedule appt with Dr. Garland today for insurance issues, next appt is 2/27/2023. Medication routed to Dr. Garland for refill approval.     JOSE ENRIQUE SWANSON RN on 1/11/2023 at 9:20 AM

## 2023-01-15 ENCOUNTER — HEALTH MAINTENANCE LETTER (OUTPATIENT)
Age: 68
End: 2023-01-15

## 2023-02-25 NOTE — PROGRESS NOTES
"HPI:    Overall doing well. He tries to walk about 10,000 steps a day. He does not smoke. He no longer has any prostatitis sxs. He does, however, check his urine at home and a little cloudy and has \"white cells\". He would like fasting lipids checked. Some minor R resolving ankle pain. No other HEENT, cardiopulmonary, abdominal, , neurological, systemic, psychiatric, lymphatic, endocrine, vascular complaints.     Past Medical History:   Diagnosis Date     Arthritis      Autoimmune disease (H)      Basal cell carcinoma      Chronic tonsillitis      Hoarseness      Skin cancer      Past Surgical History:   Procedure Laterality Date     COLONOSCOPY N/A 10/10/2022    Procedure: COLONOSCOPY, WITH POLYPECTOMY;  Surgeon: Lizbeth Blood MD;  Location: [facility] OR     ENT SURGERY  1993    fix deviated septum, scrape turbinates     MOHS MICROGRAPHIC PROCEDURE       PE:    Vitals noted, gen, nad, cooperative, alert, neck supple nl rom, no B carotid bruits, lungs with good air movement, RRR, S1, S2, no MRG, abdomen, no acute findings. He has excellent palpable tibialis pulses B. Grossly normal neurological exam.     A/P:    [facility] 10/29/2021 with Dr. Adler. He still has Ambien and sonata. He does does not like how these medications make him feel and he uses them infrequently.   2. Seen ENT Dr. Hopper 10/4/2022 for nose bleeding. No further clinical sxs.   3. Immunizations; Moderna COVID vaccine x 2 and Pfizer x 3 (bi-valent 9/25/2022). Prevnar 20 done 10/11/2022. Tdap 10/11/2022  4. Seen in Urology  For  LUTS, and treated with Bactrim in the distant past and no current sxs.   5. Seen GI, Dr. Finch, 11/20/2020 for elevated liver tests.  He had an abdominal U/S 11/9/2020 that was normal. Ordered future liver tests today 2/27/2023.   7. Dermatology visit with Dr. Chandler 8/30/202  follow up with Dr. Diallo, 8/25/2023. Dermatology. Still on niacinamide   8. PSA checked 5/30/2019. Ordered " future today   9. Colonoscopy 10/10/2022 and repeat in 7-10 years   10. Lipids checked 1/22/2021;  and HDL 39. Not on medication. Ordered future fasting lipids today.      30 minutes spent on the date of the encounter doing chart review, history and exam, documentation and further activities as noted above

## 2023-02-27 ENCOUNTER — OFFICE VISIT (OUTPATIENT)
Dept: INTERNAL MEDICINE | Facility: CLINIC | Age: 68
End: 2023-02-27
Payer: COMMERCIAL

## 2023-02-27 VITALS
DIASTOLIC BLOOD PRESSURE: 81 MMHG | WEIGHT: 171.6 LBS | HEIGHT: 70 IN | SYSTOLIC BLOOD PRESSURE: 124 MMHG | HEART RATE: 74 BPM | BODY MASS INDEX: 24.57 KG/M2 | OXYGEN SATURATION: 98 %

## 2023-02-27 DIAGNOSIS — Z12.5 SCREENING FOR PROSTATE CANCER: ICD-10-CM

## 2023-02-27 DIAGNOSIS — E78.00 HIGH BLOOD CHOLESTEROL: ICD-10-CM

## 2023-02-27 DIAGNOSIS — R82.90 ABNORMAL URINE: Primary | ICD-10-CM

## 2023-02-27 PROCEDURE — 99214 OFFICE O/P EST MOD 30 MIN: CPT | Performed by: INTERNAL MEDICINE

## 2023-02-27 ASSESSMENT — ASTHMA QUESTIONNAIRES
QUESTION_5 LAST FOUR WEEKS HOW WOULD YOU RATE YOUR ASTHMA CONTROL: COMPLETELY CONTROLLED
QUESTION_1 LAST FOUR WEEKS HOW MUCH OF THE TIME DID YOUR ASTHMA KEEP YOU FROM GETTING AS MUCH DONE AT WORK, SCHOOL OR AT HOME: NONE OF THE TIME
ACT_TOTALSCORE: 25
QUESTION_2 LAST FOUR WEEKS HOW OFTEN HAVE YOU HAD SHORTNESS OF BREATH: NOT AT ALL
QUESTION_3 LAST FOUR WEEKS HOW OFTEN DID YOUR ASTHMA SYMPTOMS (WHEEZING, COUGHING, SHORTNESS OF BREATH, CHEST TIGHTNESS OR PAIN) WAKE YOU UP AT NIGHT OR EARLIER THAN USUAL IN THE MORNING: NOT AT ALL
ACT_TOTALSCORE: 25
QUESTION_4 LAST FOUR WEEKS HOW OFTEN HAVE YOU USED YOUR RESCUE INHALER OR NEBULIZER MEDICATION (SUCH AS ALBUTEROL): NOT AT ALL

## 2023-02-27 NOTE — NURSING NOTE
Eric Espinosa is a 67 year old male patient that presents today in clinic for the following:    Chief Complaint   Patient presents with     Follow Up     Pt would like to discuss need for lipid panel and PSA; Pt also has concerns about cloudy urine     The patient's allergies and medications were reviewed as noted. A set of vitals were recorded as noted without incident. The patient does not have any other questions for the provider.    Savannah Leos, EMT at 3:31 PM on 2/27/2023

## 2023-02-28 ENCOUNTER — LAB (OUTPATIENT)
Dept: LAB | Facility: CLINIC | Age: 68
End: 2023-02-28
Payer: COMMERCIAL

## 2023-02-28 DIAGNOSIS — Z12.5 SCREENING FOR PROSTATE CANCER: ICD-10-CM

## 2023-02-28 DIAGNOSIS — E78.00 HIGH BLOOD CHOLESTEROL: ICD-10-CM

## 2023-02-28 DIAGNOSIS — R82.90 ABNORMAL URINE: ICD-10-CM

## 2023-02-28 LAB
ALBUMIN SERPL BCG-MCNC: 4.3 G/DL (ref 3.5–5.2)
ALBUMIN UR-MCNC: NEGATIVE MG/DL
ALP SERPL-CCNC: 77 U/L (ref 40–129)
ALT SERPL W P-5'-P-CCNC: 21 U/L (ref 10–50)
ANION GAP SERPL CALCULATED.3IONS-SCNC: 10 MMOL/L (ref 7–15)
APPEARANCE UR: CLEAR
AST SERPL W P-5'-P-CCNC: 27 U/L (ref 10–50)
BASOPHILS # BLD AUTO: 0 10E3/UL (ref 0–0.2)
BASOPHILS NFR BLD AUTO: 0 %
BILIRUB SERPL-MCNC: 0.9 MG/DL
BILIRUB UR QL STRIP: NEGATIVE
BUN SERPL-MCNC: 15.4 MG/DL (ref 8–23)
CALCIUM SERPL-MCNC: 9.3 MG/DL (ref 8.8–10.2)
CHLORIDE SERPL-SCNC: 102 MMOL/L (ref 98–107)
CHOLEST SERPL-MCNC: 160 MG/DL
COLOR UR AUTO: YELLOW
CREAT SERPL-MCNC: 0.8 MG/DL (ref 0.67–1.17)
DEPRECATED HCO3 PLAS-SCNC: 28 MMOL/L (ref 22–29)
EOSINOPHIL # BLD AUTO: 0.1 10E3/UL (ref 0–0.7)
EOSINOPHIL NFR BLD AUTO: 1 %
ERYTHROCYTE [DISTWIDTH] IN BLOOD BY AUTOMATED COUNT: 12.5 % (ref 10–15)
GFR SERPL CREATININE-BSD FRML MDRD: >90 ML/MIN/1.73M2
GLUCOSE SERPL-MCNC: 95 MG/DL (ref 70–99)
GLUCOSE UR STRIP-MCNC: NEGATIVE MG/DL
HCT VFR BLD AUTO: 46.5 % (ref 40–53)
HDLC SERPL-MCNC: 42 MG/DL
HGB BLD-MCNC: 15.5 G/DL (ref 13.3–17.7)
HGB UR QL STRIP: NEGATIVE
IMM GRANULOCYTES # BLD: 0 10E3/UL
IMM GRANULOCYTES NFR BLD: 0 %
KETONES UR STRIP-MCNC: NEGATIVE MG/DL
LDLC SERPL CALC-MCNC: 105 MG/DL
LEUKOCYTE ESTERASE UR QL STRIP: NEGATIVE
LYMPHOCYTES # BLD AUTO: 1.9 10E3/UL (ref 0.8–5.3)
LYMPHOCYTES NFR BLD AUTO: 17 %
MCH RBC QN AUTO: 31.1 PG (ref 26.5–33)
MCHC RBC AUTO-ENTMCNC: 33.3 G/DL (ref 31.5–36.5)
MCV RBC AUTO: 93 FL (ref 78–100)
MONOCYTES # BLD AUTO: 0.9 10E3/UL (ref 0–1.3)
MONOCYTES NFR BLD AUTO: 8 %
MUCOUS THREADS #/AREA URNS LPF: PRESENT /LPF
NEUTROPHILS # BLD AUTO: 8.6 10E3/UL (ref 1.6–8.3)
NEUTROPHILS NFR BLD AUTO: 74 %
NITRATE UR QL: NEGATIVE
NONHDLC SERPL-MCNC: 118 MG/DL
NRBC # BLD AUTO: 0 10E3/UL
NRBC BLD AUTO-RTO: 0 /100
PH UR STRIP: 6 [PH] (ref 5–7)
PLATELET # BLD AUTO: 214 10E3/UL (ref 150–450)
POTASSIUM SERPL-SCNC: 4 MMOL/L (ref 3.4–5.3)
PROT SERPL-MCNC: 7.4 G/DL (ref 6.4–8.3)
PSA SERPL-MCNC: 0.65 NG/ML (ref 0–4.5)
RBC # BLD AUTO: 4.99 10E6/UL (ref 4.4–5.9)
RBC URINE: 5 /HPF
SODIUM SERPL-SCNC: 140 MMOL/L (ref 136–145)
SP GR UR STRIP: 1.02 (ref 1–1.03)
TRIGL SERPL-MCNC: 64 MG/DL
UROBILINOGEN UR STRIP-MCNC: NORMAL MG/DL
WBC # BLD AUTO: 11.5 10E3/UL (ref 4–11)
WBC URINE: <1 /HPF

## 2023-02-28 PROCEDURE — 85025 COMPLETE CBC W/AUTO DIFF WBC: CPT | Performed by: PATHOLOGY

## 2023-02-28 PROCEDURE — 80061 LIPID PANEL: CPT | Performed by: PATHOLOGY

## 2023-02-28 PROCEDURE — 81001 URINALYSIS AUTO W/SCOPE: CPT | Performed by: PATHOLOGY

## 2023-02-28 PROCEDURE — 36415 COLL VENOUS BLD VENIPUNCTURE: CPT | Performed by: PATHOLOGY

## 2023-02-28 PROCEDURE — G0103 PSA SCREENING: HCPCS | Performed by: PATHOLOGY

## 2023-02-28 PROCEDURE — 80053 COMPREHEN METABOLIC PANEL: CPT | Performed by: PATHOLOGY

## 2023-04-21 ENCOUNTER — PRE VISIT (OUTPATIENT)
Dept: UROLOGY | Facility: CLINIC | Age: 68
End: 2023-04-21
Payer: COMMERCIAL

## 2023-06-20 DIAGNOSIS — G47.9 SLEEP DISORDER: ICD-10-CM

## 2023-06-20 RX ORDER — ZALEPLON 5 MG/1
5 CAPSULE ORAL
Qty: 30 CAPSULE | Refills: 5 | Status: SHIPPED | OUTPATIENT
Start: 2023-06-20 | End: 2024-02-07

## 2023-07-07 ENCOUNTER — TELEPHONE (OUTPATIENT)
Dept: INTERNAL MEDICINE | Facility: CLINIC | Age: 68
End: 2023-07-07
Payer: COMMERCIAL

## 2023-07-07 NOTE — TELEPHONE ENCOUNTER
Faxed Feb 2023 visit notes and lab results.  No EKG on file.        Mitesh Watkins CMA ([facility]) at 3:56 PM on 7/7/2023

## 2023-07-07 NOTE — TELEPHONE ENCOUNTER
[facility] Call Center    Phone Message    May a detailed message be left on voicemail: yes     Reason for Call: Other: [facility] was calling to let us know the patient is going to be having surgery with them and they need the patients most recent Clinic notes and any recent EKG's reports and labs, they need this before 7/14/23, please call to address any questions or concerns thank you      Action Taken: Message routed to:  [facility] ([facility]    Travel Screening: Not Applicable

## 2024-02-07 ENCOUNTER — TELEPHONE (OUTPATIENT)
Dept: PULMONOLOGY | Facility: OTHER | Age: 69
End: 2024-02-07

## 2024-02-07 DIAGNOSIS — G47.9 SLEEP DISORDER: ICD-10-CM

## 2024-02-07 RX ORDER — ZALEPLON 5 MG/1
5 CAPSULE ORAL
Qty: 30 CAPSULE | Refills: 5 | Status: SHIPPED | OUTPATIENT
Start: 2024-02-07

## 2024-02-17 ENCOUNTER — HEALTH MAINTENANCE LETTER (OUTPATIENT)
Age: 69
End: 2024-02-17

## 2025-03-09 ENCOUNTER — HEALTH MAINTENANCE LETTER (OUTPATIENT)
Age: 70
End: 2025-03-09

## (undated) DEVICE — KIT ENDO TURNOVER/PROCEDURE CARRY-ON 101822

## (undated) DEVICE — SUCTION MANIFOLD NEPTUNE 2 SYS 1 PORT 702-025-000

## (undated) DEVICE — ENDO FORCEP SPIKED SERRATED SHAFT JUMBO 239CM G56998

## (undated) DEVICE — SOL WATER IRRIG 500ML BOTTLE 2F7113

## (undated) DEVICE — TUBING SUCTION 12"X1/4" N612

## (undated) DEVICE — GOWN IMPERVIOUS 2XL BLUE

## (undated) DEVICE — SPECIMEN CONTAINER 3OZ W/FORMALIN 59901

## (undated) DEVICE — SNARE CAPIVATOR ROUND COLD SNR BX10 M00561101

## (undated) RX ORDER — FENTANYL CITRATE 50 UG/ML
INJECTION, SOLUTION INTRAMUSCULAR; INTRAVENOUS
Status: DISPENSED
Start: 2020-09-16

## (undated) RX ORDER — LIDOCAINE HYDROCHLORIDE 20 MG/ML
JELLY TOPICAL
Status: DISPENSED
Start: 2021-04-19

## (undated) RX ORDER — FENTANYL CITRATE 50 UG/ML
INJECTION, SOLUTION INTRAMUSCULAR; INTRAVENOUS
Status: DISPENSED
Start: 2022-10-10